# Patient Record
Sex: MALE | Race: BLACK OR AFRICAN AMERICAN | NOT HISPANIC OR LATINO | URBAN - METROPOLITAN AREA
[De-identification: names, ages, dates, MRNs, and addresses within clinical notes are randomized per-mention and may not be internally consistent; named-entity substitution may affect disease eponyms.]

---

## 2017-01-31 ENCOUNTER — OUTPATIENT (OUTPATIENT)
Dept: OUTPATIENT SERVICES | Facility: HOSPITAL | Age: 77
LOS: 1 days | Discharge: HOME | End: 2017-01-31
Payer: MEDICARE

## 2017-01-31 VITALS
HEART RATE: 69 BPM | RESPIRATION RATE: 16 BRPM | TEMPERATURE: 98 F | DIASTOLIC BLOOD PRESSURE: 70 MMHG | SYSTOLIC BLOOD PRESSURE: 137 MMHG | OXYGEN SATURATION: 97 %

## 2017-01-31 DIAGNOSIS — Z90.49 ACQUIRED ABSENCE OF OTHER SPECIFIED PARTS OF DIGESTIVE TRACT: Chronic | ICD-10-CM

## 2017-01-31 DIAGNOSIS — R94.31 ABNORMAL ELECTROCARDIOGRAM [ECG] [EKG]: ICD-10-CM

## 2017-01-31 DIAGNOSIS — Z95.1 PRESENCE OF AORTOCORONARY BYPASS GRAFT: Chronic | ICD-10-CM

## 2017-01-31 LAB
BUN SERPL-MCNC: 14 MG/DL — SIGNIFICANT CHANGE UP (ref 7–23)
CALCIUM SERPL-MCNC: 9.3 MG/DL — SIGNIFICANT CHANGE UP (ref 8.4–10.5)
CHLORIDE SERPL-SCNC: 100 MMOL/L — SIGNIFICANT CHANGE UP (ref 98–107)
CO2 SERPL-SCNC: 29 MMOL/L — SIGNIFICANT CHANGE UP (ref 22–31)
CREAT SERPL-MCNC: 0.72 MG/DL — SIGNIFICANT CHANGE UP (ref 0.5–1.3)
GLUCOSE SERPL-MCNC: 141 MG/DL — HIGH (ref 70–99)
HBA1C BLD-MCNC: 7.4 % — HIGH (ref 4–5.6)
HCT VFR BLD CALC: 39.2 % — SIGNIFICANT CHANGE UP (ref 39–50)
HGB BLD-MCNC: 13.2 G/DL — SIGNIFICANT CHANGE UP (ref 13–17)
MCHC RBC-ENTMCNC: 30.8 PG — SIGNIFICANT CHANGE UP (ref 27–34)
MCHC RBC-ENTMCNC: 33.7 % — SIGNIFICANT CHANGE UP (ref 32–36)
MCV RBC AUTO: 91.6 FL — SIGNIFICANT CHANGE UP (ref 80–100)
PLATELET # BLD AUTO: 161 K/UL — SIGNIFICANT CHANGE UP (ref 150–400)
PMV BLD: 9.8 FL — SIGNIFICANT CHANGE UP (ref 7–13)
POTASSIUM SERPL-MCNC: 4.2 MMOL/L — SIGNIFICANT CHANGE UP (ref 3.5–5.3)
POTASSIUM SERPL-SCNC: 4.2 MMOL/L — SIGNIFICANT CHANGE UP (ref 3.5–5.3)
RBC # BLD: 4.28 M/UL — SIGNIFICANT CHANGE UP (ref 4.2–5.8)
RBC # FLD: 12.6 % — SIGNIFICANT CHANGE UP (ref 10.3–14.5)
SODIUM SERPL-SCNC: 138 MMOL/L — SIGNIFICANT CHANGE UP (ref 135–145)
WBC # BLD: 7.19 K/UL — SIGNIFICANT CHANGE UP (ref 3.8–10.5)
WBC # FLD AUTO: 7.19 K/UL — SIGNIFICANT CHANGE UP (ref 3.8–10.5)

## 2017-01-31 PROCEDURE — 93010 ELECTROCARDIOGRAM REPORT: CPT

## 2017-01-31 RX ORDER — SODIUM CHLORIDE 9 MG/ML
1000 INJECTION INTRAMUSCULAR; INTRAVENOUS; SUBCUTANEOUS
Qty: 0 | Refills: 0 | Status: DISCONTINUED | OUTPATIENT
Start: 2017-01-31 | End: 2017-02-15

## 2017-01-31 RX ORDER — SODIUM CHLORIDE 9 MG/ML
3 INJECTION INTRAMUSCULAR; INTRAVENOUS; SUBCUTANEOUS EVERY 8 HOURS
Qty: 0 | Refills: 0 | Status: DISCONTINUED | OUTPATIENT
Start: 2017-01-31 | End: 2017-02-15

## 2017-01-31 RX ADMIN — SODIUM CHLORIDE 75 MILLILITER(S): 9 INJECTION INTRAMUSCULAR; INTRAVENOUS; SUBCUTANEOUS at 18:57

## 2017-01-31 NOTE — H&P CARDIOLOGY - HISTORY OF PRESENT ILLNESS
76 y.o male presents today for elective cardiac catheterization.
76 y.o. male presents today for elective cardiac catheterization. The patient c/o SOB with exertion (prolonged walking). Admits to "heaviness in the chest" after food intake. Denies palpitations, dizziness. Admits to chronic b/l lower extremities edema. The patient was evaluated by a cardiologist, had Echo and Stress test  - reports requested. The patient was told that his heart muscle is weak. Due to patient's symptoms and abnormal non invasive tests, the patient was recommended to have cardiac catheterization.  The patient denies any complaints at present.

## 2017-01-31 NOTE — H&P CARDIOLOGY - REVIEW OF SYSTEMS
NO chest pain,  palpitations, diaphoresis, lightheadedness, dizziness, syncope,  fever chills, malaise, myalgias, anorexia, weight changes ( loss or gain), night sweats, generalized fatigue abdominal pain, N/V/C/D BRBPR, melena, urinary symptoms, cough, and wheezing.

## 2017-01-31 NOTE — H&P CARDIOLOGY - PMH
CAD (coronary artery disease)    Essential hypertension    Hypercholesterolemia    Old MI (myocardial infarction)  1998  Prediabetes    S/P CABG x 3  in 1998
Essential hypertension    Prediabetes

## 2017-11-08 PROBLEM — Z00.00 ENCOUNTER FOR PREVENTIVE HEALTH EXAMINATION: Status: ACTIVE | Noted: 2017-11-08

## 2017-11-09 ENCOUNTER — RX RENEWAL (OUTPATIENT)
Age: 77
End: 2017-11-09

## 2017-11-09 DIAGNOSIS — I10 ESSENTIAL (PRIMARY) HYPERTENSION: ICD-10-CM

## 2017-11-09 DIAGNOSIS — E78.00 PURE HYPERCHOLESTEROLEMIA, UNSPECIFIED: ICD-10-CM

## 2017-11-09 RX ORDER — ATORVASTATIN CALCIUM 20 MG/1
20 TABLET, FILM COATED ORAL
Qty: 90 | Refills: 3 | Status: ACTIVE | COMMUNITY
Start: 2017-11-09 | End: 1900-01-01

## 2017-11-09 RX ORDER — LISINOPRIL 5 MG/1
5 TABLET ORAL
Qty: 90 | Refills: 0 | Status: ACTIVE | COMMUNITY
Start: 2017-11-09 | End: 1900-01-01

## 2020-04-01 ENCOUNTER — INPATIENT (INPATIENT)
Facility: HOSPITAL | Age: 80
LOS: 20 days | End: 2020-04-22
Attending: INTERNAL MEDICINE | Admitting: INTERNAL MEDICINE
Payer: MEDICARE

## 2020-04-01 VITALS
DIASTOLIC BLOOD PRESSURE: 57 MMHG | SYSTOLIC BLOOD PRESSURE: 136 MMHG | RESPIRATION RATE: 22 BRPM | TEMPERATURE: 99 F | OXYGEN SATURATION: 89 % | HEART RATE: 94 BPM

## 2020-04-01 DIAGNOSIS — J18.9 PNEUMONIA, UNSPECIFIED ORGANISM: ICD-10-CM

## 2020-04-01 DIAGNOSIS — Z95.1 PRESENCE OF AORTOCORONARY BYPASS GRAFT: Chronic | ICD-10-CM

## 2020-04-01 DIAGNOSIS — R09.02 HYPOXEMIA: ICD-10-CM

## 2020-04-01 DIAGNOSIS — I10 ESSENTIAL (PRIMARY) HYPERTENSION: ICD-10-CM

## 2020-04-01 DIAGNOSIS — B34.9 VIRAL INFECTION, UNSPECIFIED: ICD-10-CM

## 2020-04-01 DIAGNOSIS — Z90.49 ACQUIRED ABSENCE OF OTHER SPECIFIED PARTS OF DIGESTIVE TRACT: Chronic | ICD-10-CM

## 2020-04-01 DIAGNOSIS — I25.810 ATHEROSCLEROSIS OF CORONARY ARTERY BYPASS GRAFT(S) WITHOUT ANGINA PECTORIS: ICD-10-CM

## 2020-04-01 DIAGNOSIS — E11.9 TYPE 2 DIABETES MELLITUS WITHOUT COMPLICATIONS: ICD-10-CM

## 2020-04-01 DIAGNOSIS — E78.00 PURE HYPERCHOLESTEROLEMIA, UNSPECIFIED: ICD-10-CM

## 2020-04-01 PROBLEM — I25.10 ATHEROSCLEROTIC HEART DISEASE OF NATIVE CORONARY ARTERY WITHOUT ANGINA PECTORIS: Chronic | Status: ACTIVE | Noted: 2017-01-31

## 2020-04-01 PROBLEM — R73.03 PREDIABETES: Chronic | Status: ACTIVE | Noted: 2017-01-31

## 2020-04-01 PROBLEM — I25.2 OLD MYOCARDIAL INFARCTION: Chronic | Status: ACTIVE | Noted: 2017-01-31

## 2020-04-01 LAB
ALBUMIN SERPL ELPH-MCNC: 3.5 G/DL — SIGNIFICANT CHANGE UP (ref 3.3–5)
ALP SERPL-CCNC: 55 U/L — SIGNIFICANT CHANGE UP (ref 40–120)
ALT FLD-CCNC: 16 U/L — SIGNIFICANT CHANGE UP (ref 4–41)
ANION GAP SERPL CALC-SCNC: 16 MMO/L — HIGH (ref 7–14)
AST SERPL-CCNC: 47 U/L — HIGH (ref 4–40)
BASE EXCESS BLDV CALC-SCNC: 6.5 MMOL/L — SIGNIFICANT CHANGE UP
BASOPHILS # BLD AUTO: 0.01 K/UL — SIGNIFICANT CHANGE UP (ref 0–0.2)
BASOPHILS NFR BLD AUTO: 0.2 % — SIGNIFICANT CHANGE UP (ref 0–2)
BASOPHILS NFR SPEC: 0 % — SIGNIFICANT CHANGE UP (ref 0–2)
BILIRUB SERPL-MCNC: 0.5 MG/DL — SIGNIFICANT CHANGE UP (ref 0.2–1.2)
BLASTS # FLD: 0 % — SIGNIFICANT CHANGE UP (ref 0–0)
BLOOD GAS VENOUS - CREATININE: 0.66 MG/DL — SIGNIFICANT CHANGE UP (ref 0.5–1.3)
BUN SERPL-MCNC: 17 MG/DL — SIGNIFICANT CHANGE UP (ref 7–23)
CALCIUM SERPL-MCNC: 9.2 MG/DL — SIGNIFICANT CHANGE UP (ref 8.4–10.5)
CHLORIDE BLDV-SCNC: 96 MMOL/L — SIGNIFICANT CHANGE UP (ref 96–108)
CHLORIDE SERPL-SCNC: 91 MMOL/L — LOW (ref 98–107)
CO2 SERPL-SCNC: 26 MMOL/L — SIGNIFICANT CHANGE UP (ref 22–31)
CREAT SERPL-MCNC: 0.62 MG/DL — SIGNIFICANT CHANGE UP (ref 0.5–1.3)
EOSINOPHIL # BLD AUTO: 0 K/UL — SIGNIFICANT CHANGE UP (ref 0–0.5)
EOSINOPHIL NFR BLD AUTO: 0 % — SIGNIFICANT CHANGE UP (ref 0–6)
EOSINOPHIL NFR FLD: 0 % — SIGNIFICANT CHANGE UP (ref 0–6)
GAS PNL BLDV: 132 MMOL/L — LOW (ref 136–146)
GIANT PLATELETS BLD QL SMEAR: PRESENT — SIGNIFICANT CHANGE UP
GLUCOSE BLDV-MCNC: 220 MG/DL — HIGH (ref 70–99)
GLUCOSE SERPL-MCNC: 232 MG/DL — HIGH (ref 70–99)
HCO3 BLDV-SCNC: 28 MMOL/L — HIGH (ref 20–27)
HCT VFR BLD CALC: 40.7 % — SIGNIFICANT CHANGE UP (ref 39–50)
HCT VFR BLDV CALC: 41.6 % — SIGNIFICANT CHANGE UP (ref 39–51)
HGB BLD-MCNC: 13.6 G/DL — SIGNIFICANT CHANGE UP (ref 13–17)
HGB BLDV-MCNC: 13.6 G/DL — SIGNIFICANT CHANGE UP (ref 13–17)
IMM GRANULOCYTES NFR BLD AUTO: 0.3 % — SIGNIFICANT CHANGE UP (ref 0–1.5)
LACTATE BLDV-MCNC: 2.5 MMOL/L — HIGH (ref 0.5–2)
LYMPHOCYTES # BLD AUTO: 0.99 K/UL — LOW (ref 1–3.3)
LYMPHOCYTES # BLD AUTO: 15.3 % — SIGNIFICANT CHANGE UP (ref 13–44)
LYMPHOCYTES NFR SPEC AUTO: 0.9 % — LOW (ref 13–44)
MACROCYTES BLD QL: SLIGHT — SIGNIFICANT CHANGE UP
MCHC RBC-ENTMCNC: 31.1 PG — SIGNIFICANT CHANGE UP (ref 27–34)
MCHC RBC-ENTMCNC: 33.4 % — SIGNIFICANT CHANGE UP (ref 32–36)
MCV RBC AUTO: 92.9 FL — SIGNIFICANT CHANGE UP (ref 80–100)
METAMYELOCYTES # FLD: 0 % — SIGNIFICANT CHANGE UP (ref 0–1)
MICROCYTES BLD QL: SLIGHT — SIGNIFICANT CHANGE UP
MONOCYTES # BLD AUTO: 0.17 K/UL — SIGNIFICANT CHANGE UP (ref 0–0.9)
MONOCYTES NFR BLD AUTO: 2.6 % — SIGNIFICANT CHANGE UP (ref 2–14)
MONOCYTES NFR BLD: 6.1 % — SIGNIFICANT CHANGE UP (ref 2–9)
MYELOCYTES NFR BLD: 0 % — SIGNIFICANT CHANGE UP (ref 0–0)
NEUTROPHIL AB SER-ACNC: 77.2 % — HIGH (ref 43–77)
NEUTROPHILS # BLD AUTO: 5.27 K/UL — SIGNIFICANT CHANGE UP (ref 1.8–7.4)
NEUTROPHILS NFR BLD AUTO: 81.6 % — HIGH (ref 43–77)
NEUTS BAND # BLD: 9.7 % — HIGH (ref 0–6)
NRBC # FLD: 0 K/UL — SIGNIFICANT CHANGE UP (ref 0–0)
OTHER - HEMATOLOGY %: 0 — SIGNIFICANT CHANGE UP
PCO2 BLDV: 52 MMHG — HIGH (ref 41–51)
PH BLDV: 7.4 PH — SIGNIFICANT CHANGE UP (ref 7.32–7.43)
PLATELET # BLD AUTO: 164 K/UL — SIGNIFICANT CHANGE UP (ref 150–400)
PLATELET COUNT - ESTIMATE: NORMAL — SIGNIFICANT CHANGE UP
PMV BLD: 10.1 FL — SIGNIFICANT CHANGE UP (ref 7–13)
PO2 BLDV: 29 MMHG — LOW (ref 35–40)
POTASSIUM BLDV-SCNC: 4.2 MMOL/L — SIGNIFICANT CHANGE UP (ref 3.4–4.5)
POTASSIUM SERPL-MCNC: 4.4 MMOL/L — SIGNIFICANT CHANGE UP (ref 3.5–5.3)
POTASSIUM SERPL-SCNC: 4.4 MMOL/L — SIGNIFICANT CHANGE UP (ref 3.5–5.3)
PROMYELOCYTES # FLD: 0 % — SIGNIFICANT CHANGE UP (ref 0–0)
PROT SERPL-MCNC: 8 G/DL — SIGNIFICANT CHANGE UP (ref 6–8.3)
RBC # BLD: 4.38 M/UL — SIGNIFICANT CHANGE UP (ref 4.2–5.8)
RBC # FLD: 12.2 % — SIGNIFICANT CHANGE UP (ref 10.3–14.5)
REVIEW TO FOLLOW: YES — SIGNIFICANT CHANGE UP
SAO2 % BLDV: 50.9 % — LOW (ref 60–85)
SARS-COV-2 RNA SPEC QL NAA+PROBE: DETECTED
SODIUM SERPL-SCNC: 133 MMOL/L — LOW (ref 135–145)
SPHEROCYTES BLD QL SMEAR: SLIGHT — SIGNIFICANT CHANGE UP
VARIANT LYMPHS # BLD: 6.1 % — SIGNIFICANT CHANGE UP
WBC # BLD: 6.46 K/UL — SIGNIFICANT CHANGE UP (ref 3.8–10.5)
WBC # FLD AUTO: 6.46 K/UL — SIGNIFICANT CHANGE UP (ref 3.8–10.5)

## 2020-04-01 PROCEDURE — 71045 X-RAY EXAM CHEST 1 VIEW: CPT | Mod: 26

## 2020-04-01 RX ORDER — DEXTROSE 50 % IN WATER 50 %
25 SYRINGE (ML) INTRAVENOUS ONCE
Refills: 0 | Status: DISCONTINUED | OUTPATIENT
Start: 2020-04-01 | End: 2020-04-22

## 2020-04-01 RX ORDER — HEPARIN SODIUM 5000 [USP'U]/ML
5000 INJECTION INTRAVENOUS; SUBCUTANEOUS EVERY 8 HOURS
Refills: 0 | Status: DISCONTINUED | OUTPATIENT
Start: 2020-04-01 | End: 2020-04-06

## 2020-04-01 RX ORDER — METFORMIN HYDROCHLORIDE 850 MG/1
500 TABLET ORAL
Qty: 0 | Refills: 0 | DISCHARGE

## 2020-04-01 RX ORDER — HYDROXYCHLOROQUINE SULFATE 200 MG
400 TABLET ORAL EVERY 12 HOURS
Refills: 0 | Status: COMPLETED | OUTPATIENT
Start: 2020-04-01 | End: 2020-04-02

## 2020-04-01 RX ORDER — GLUCAGON INJECTION, SOLUTION 0.5 MG/.1ML
1 INJECTION, SOLUTION SUBCUTANEOUS ONCE
Refills: 0 | Status: DISCONTINUED | OUTPATIENT
Start: 2020-04-01 | End: 2020-04-22

## 2020-04-01 RX ORDER — CARVEDILOL PHOSPHATE 80 MG/1
1 CAPSULE, EXTENDED RELEASE ORAL
Qty: 0 | Refills: 0 | DISCHARGE

## 2020-04-01 RX ORDER — INSULIN LISPRO 100/ML
VIAL (ML) SUBCUTANEOUS AT BEDTIME
Refills: 0 | Status: DISCONTINUED | OUTPATIENT
Start: 2020-04-01 | End: 2020-04-02

## 2020-04-01 RX ORDER — INSULIN GLARGINE 100 [IU]/ML
15 INJECTION, SOLUTION SUBCUTANEOUS AT BEDTIME
Refills: 0 | Status: DISCONTINUED | OUTPATIENT
Start: 2020-04-01 | End: 2020-04-03

## 2020-04-01 RX ORDER — AZITHROMYCIN 500 MG/1
500 TABLET, FILM COATED ORAL DAILY
Refills: 0 | Status: DISCONTINUED | OUTPATIENT
Start: 2020-04-02 | End: 2020-04-03

## 2020-04-01 RX ORDER — SODIUM CHLORIDE 9 MG/ML
1000 INJECTION, SOLUTION INTRAVENOUS
Refills: 0 | Status: DISCONTINUED | OUTPATIENT
Start: 2020-04-01 | End: 2020-04-22

## 2020-04-01 RX ORDER — CARVEDILOL PHOSPHATE 80 MG/1
25 CAPSULE, EXTENDED RELEASE ORAL EVERY 12 HOURS
Refills: 0 | Status: DISCONTINUED | OUTPATIENT
Start: 2020-04-01 | End: 2020-04-22

## 2020-04-01 RX ORDER — ACETAMINOPHEN 500 MG
650 TABLET ORAL EVERY 4 HOURS
Refills: 0 | Status: DISCONTINUED | OUTPATIENT
Start: 2020-04-01 | End: 2020-04-22

## 2020-04-01 RX ORDER — AZITHROMYCIN 500 MG/1
500 TABLET, FILM COATED ORAL ONCE
Refills: 0 | Status: COMPLETED | OUTPATIENT
Start: 2020-04-01 | End: 2020-04-01

## 2020-04-01 RX ORDER — ASPIRIN/CALCIUM CARB/MAGNESIUM 324 MG
81 TABLET ORAL DAILY
Refills: 0 | Status: DISCONTINUED | OUTPATIENT
Start: 2020-04-01 | End: 2020-04-22

## 2020-04-01 RX ORDER — ISOSORBIDE MONONITRATE 60 MG/1
1 TABLET, EXTENDED RELEASE ORAL
Qty: 0 | Refills: 0 | DISCHARGE

## 2020-04-01 RX ORDER — CEFTRIAXONE 500 MG/1
1000 INJECTION, POWDER, FOR SOLUTION INTRAMUSCULAR; INTRAVENOUS EVERY 24 HOURS
Refills: 0 | Status: DISCONTINUED | OUTPATIENT
Start: 2020-04-02 | End: 2020-04-03

## 2020-04-01 RX ORDER — DEXTROSE 50 % IN WATER 50 %
12.5 SYRINGE (ML) INTRAVENOUS ONCE
Refills: 0 | Status: DISCONTINUED | OUTPATIENT
Start: 2020-04-01 | End: 2020-04-22

## 2020-04-01 RX ORDER — ASCORBIC ACID 60 MG
1500 TABLET,CHEWABLE ORAL ONCE
Refills: 0 | Status: COMPLETED | OUTPATIENT
Start: 2020-04-01 | End: 2020-04-02

## 2020-04-01 RX ORDER — ASPIRIN/CALCIUM CARB/MAGNESIUM 324 MG
1 TABLET ORAL
Qty: 0 | Refills: 0 | DISCHARGE

## 2020-04-01 RX ORDER — DEXTROSE 50 % IN WATER 50 %
15 SYRINGE (ML) INTRAVENOUS ONCE
Refills: 0 | Status: DISCONTINUED | OUTPATIENT
Start: 2020-04-01 | End: 2020-04-22

## 2020-04-01 RX ORDER — ATORVASTATIN CALCIUM 80 MG/1
1 TABLET, FILM COATED ORAL
Qty: 0 | Refills: 0 | DISCHARGE

## 2020-04-01 RX ORDER — CARVEDILOL PHOSPHATE 80 MG/1
25 CAPSULE, EXTENDED RELEASE ORAL EVERY 12 HOURS
Refills: 0 | Status: DISCONTINUED | OUTPATIENT
Start: 2020-04-01 | End: 2020-04-01

## 2020-04-01 RX ORDER — LISINOPRIL 2.5 MG/1
1 TABLET ORAL
Qty: 0 | Refills: 0 | DISCHARGE

## 2020-04-01 RX ORDER — ATORVASTATIN CALCIUM 80 MG/1
20 TABLET, FILM COATED ORAL AT BEDTIME
Refills: 0 | Status: DISCONTINUED | OUTPATIENT
Start: 2020-04-01 | End: 2020-04-22

## 2020-04-01 RX ORDER — THIAMINE MONONITRATE (VIT B1) 100 MG
200 TABLET ORAL
Refills: 0 | Status: DISCONTINUED | OUTPATIENT
Start: 2020-04-01 | End: 2020-04-06

## 2020-04-01 RX ORDER — ALBUTEROL 90 UG/1
2 AEROSOL, METERED ORAL EVERY 4 HOURS
Refills: 0 | Status: DISCONTINUED | OUTPATIENT
Start: 2020-04-01 | End: 2020-04-10

## 2020-04-01 RX ORDER — HYDROXYCHLOROQUINE SULFATE 200 MG
200 TABLET ORAL EVERY 12 HOURS
Refills: 0 | Status: COMPLETED | OUTPATIENT
Start: 2020-04-02 | End: 2020-04-06

## 2020-04-01 RX ORDER — CEFTRIAXONE 500 MG/1
1000 INJECTION, POWDER, FOR SOLUTION INTRAMUSCULAR; INTRAVENOUS ONCE
Refills: 0 | Status: COMPLETED | OUTPATIENT
Start: 2020-04-01 | End: 2020-04-01

## 2020-04-01 RX ORDER — HYDROXYCHLOROQUINE SULFATE 200 MG
TABLET ORAL
Refills: 0 | Status: COMPLETED | OUTPATIENT
Start: 2020-04-01 | End: 2020-04-06

## 2020-04-01 RX ORDER — LISINOPRIL 2.5 MG/1
5 TABLET ORAL DAILY
Refills: 0 | Status: DISCONTINUED | OUTPATIENT
Start: 2020-04-01 | End: 2020-04-01

## 2020-04-01 RX ORDER — INSULIN LISPRO 100/ML
VIAL (ML) SUBCUTANEOUS
Refills: 0 | Status: DISCONTINUED | OUTPATIENT
Start: 2020-04-01 | End: 2020-04-02

## 2020-04-01 RX ADMIN — Medication 400 MILLIGRAM(S): at 18:09

## 2020-04-01 RX ADMIN — CARVEDILOL PHOSPHATE 25 MILLIGRAM(S): 80 CAPSULE, EXTENDED RELEASE ORAL at 18:11

## 2020-04-01 RX ADMIN — ATORVASTATIN CALCIUM 20 MILLIGRAM(S): 80 TABLET, FILM COATED ORAL at 22:23

## 2020-04-01 RX ADMIN — HEPARIN SODIUM 5000 UNIT(S): 5000 INJECTION INTRAVENOUS; SUBCUTANEOUS at 22:23

## 2020-04-01 RX ADMIN — AZITHROMYCIN 500 MILLIGRAM(S): 500 TABLET, FILM COATED ORAL at 12:52

## 2020-04-01 RX ADMIN — Medication 35 MILLIGRAM(S): at 18:07

## 2020-04-01 RX ADMIN — CEFTRIAXONE 100 MILLIGRAM(S): 500 INJECTION, POWDER, FOR SOLUTION INTRAMUSCULAR; INTRAVENOUS at 12:52

## 2020-04-01 RX ADMIN — Medication 200 MILLIGRAM(S): at 18:10

## 2020-04-01 NOTE — ED ADULT TRIAGE NOTE - CHIEF COMPLAINT QUOTE
PT C/O fevers, cough, SOB, weakness, myalgias worsening x 1 week. PT 02 sat 62 % on room air. Pt placed on nonrebreather mask and 02 sat 89%. PHX: previous MI, CAD

## 2020-04-01 NOTE — H&P ADULT - NSHPPHYSICALEXAM_GEN_ALL_CORE
· SKIN: Skin normal color for race, warm, dry and intact. No evidence of rash.  · NEUROLOGICAL: Alert and oriented, no focal deficits, no motor or sensory deficits.  · MUSCULOSKELETAL: Spine appears normal, range of motion is not limited, no muscle or joint tenderness  · GASTROINTESTINAL: Abdomen soft, non-tender, no guarding.  · Chest Exam: normal  · Breath Sounds: b/l LE crackles  · Respiratory Distress: no  · RESPIRATORY: - - -  · CARDIAC: Normal rate, regular rhythm.  Heart sounds S1, S2.  No murmurs, rubs or gallops.  · ENMT: Airway patent, Nasal mucosa clear. Mouth with normal mucosa. Throat has no vesicles, no oropharyngeal exudates and uvula is midline.  · Nourishment: well  · Mood: appropriate  · Mentation: awake, alert, oriented to person, place, time/situation  · Manner: appropriate for situation  · Distress: no apparent  · Development: well developed  · Appearance: well appearing

## 2020-04-01 NOTE — H&P ADULT - NSHPREVIEWOFSYSTEMS_GEN_ALL_CORE
· ROS STATEMENT: all other ROS negative except as per HPI  · Respiratory [+]: COUGH, SHORTNESS OF BREATH  · RESPIRATORY: - - -  · Constitutional [+]: FEVER  · CONSTITUTIONAL: - - -

## 2020-04-01 NOTE — H&P ADULT - PROBLEM SELECTOR PLAN 5
-start lantus 15 units  -humalog mod sliding scale   -currently npo, likely will have hyperglycemia from steroids

## 2020-04-01 NOTE — ED ADULT NURSE NOTE - OBJECTIVE STATEMENT
Pt. A&Ox3, c/o sob, cough and fevers x 10 days. Pt. brought to rm 22, tachypneic and labored. Pt. appears slightly lethargic. Placed on non-rebreather and nasal cannula. Sating 77 on RA. #20g IV placed to right AC, labs sent. MD at bedside for eval. Will continue to monitor.

## 2020-04-01 NOTE — H&P ADULT - HISTORY OF PRESENT ILLNESS
78 y/o male pmh htn, hld, CAD s/p CABG c/o fever and cough x1 week. Pt admits to dry cough, subjective fever and sob over the last 1 week. Pt admits to worsening sob x2 days. Denies recent travel or sick contacts. Denies chest pain, palpitations, n/v/d, numbness, tingling, dizziness or syncope.

## 2020-04-01 NOTE — H&P ADULT - ASSESSMENT
78 y/o male pmh htn, hld, CAD s/p CABG c/o fever and cough x1 week. Admitted for covid r/o and hypoxia

## 2020-04-01 NOTE — ED PROVIDER NOTE - CLINICAL SUMMARY MEDICAL DECISION MAKING FREE TEXT BOX
[place]: Older man, F, cough, SOB. RA SaO2 at rest 77%. (B) crackles. No LE edema. Likely COVID PNA. Labs, CXR, COVID swab, O2, admit.

## 2020-04-01 NOTE — H&P ADULT - NSICDXPASTMEDICALHX_GEN_ALL_CORE_FT
PAST MEDICAL HISTORY:  CAD (coronary artery disease)     Essential hypertension     Hypercholesterolemia     Old MI (myocardial infarction) 1998    Prediabetes     S/P CABG x 3 in 1998

## 2020-04-01 NOTE — H&P ADULT - PROBLEM SELECTOR PLAN 6
statin statin    Due to [facility] policy during the evolving novel coronavirus outbreak, efforts are being made to limit unnecessary patient contacts to limit the spread of disease and preserve limited PPE. H&P along with assessment and plan is completed with the HPI, ROS, and PE of the emergency department. I have spoken to the patient to discuss code status.

## 2020-04-01 NOTE — H&P ADULT - PROBLEM SELECTOR PLAN 1
acute hypoxic respiratory failure.   Chest xray reveals right upper opacity > left Suspected 2/2 viral illness but bacterial pna can not be ruled out.   COVID +  - continue ceftriaxone 1g qday and azithromycin 500 mg qday   - start hydroxychloroquine   - EKG daily qtc 471  - solumedrol 35 mg IV BID assuming 70 kg   - albuterol HFA prn   - f/u covid pcr   - NRB right now and saturating 90-92 %, tachypneic. Unable to tolerate prone position. Will call MICU to intubate if sat <88%    GOC: Full code, patient is still AOx3.

## 2020-04-01 NOTE — H&P ADULT - NSHPLABSRESULTS_GEN_ALL_CORE
13.6   6.46  )-----------( 164      ( 01 Apr 2020 10:20 )             40.7       04-01    133<L>  |  91<L>  |  17  ----------------------------<  232<H>  4.4   |  26  |  0.62    Ca    9.2      01 Apr 2020 10:20    TPro  8.0  /  Alb  3.5  /  TBili  0.5  /  DBili  x   /  AST  47<H>  /  ALT  16  /  AlkPhos  55  04-01        Lactate Trend    CAPILLARY BLOOD GLUCOSE      Cultures:    Radiology: < from: Xray Chest 1 View- PORTABLE-Urgent (04.01.20 @ 11:21) >    EXAM:  XR CHEST PORTABLE URGENT 1V        PROCEDURE DATE:  Apr 1 2020         INTERPRETATION:  TIME OF EXAM: April 1, 2020 at 11:00 AM    CLINICAL INFORMATION: Cough and fever one week    TECHNIQUE:   Portable chest:    INTERPRETATION:     There is a right upper lobe peripheral ill-defined haziness consistent with covid 19 infection. Hazy left lung base could represent effusion and atelectasis. Sternotomy wires are present. No cardiomegaly or congestion to indicate CHF.      COMPARISON:  None available      IMPRESSION:  Vague, peripheral, right upper lobe opacity more than the left side consistent with covid 19 infection.    < end of copied text >        EKG:

## 2020-04-01 NOTE — ED PROVIDER NOTE - ATTENDING CONTRIBUTION TO CARE
I performed a face-to-face evaluation of the patient and performed a history and physical examination. I agree with the history and physical examination.    [place]: Older man, F, cough, SOB. RA SaO2 at rest 77%. (B) crackles. No LE edema. Likely COVID PNA. Labs, CXR, COVID swab, O2, admit.

## 2020-04-01 NOTE — ED ADULT NURSE REASSESSMENT NOTE - NS ED NURSE REASSESS COMMENT FT1
pt tachepneac 40-50. pt not diaphoretic, pt SPO2 @97 on 15 L NR. PA aware, to bedside to assess.   PA will consult with MICU and this RN instructed to call should pt desaturate.

## 2020-04-01 NOTE — ED PROVIDER NOTE - PMH
CAD (coronary artery disease)    Essential hypertension    Hypercholesterolemia    Old MI (myocardial infarction)  1998  Prediabetes    S/P CABG x 3  in 1998

## 2020-04-02 LAB
ALBUMIN SERPL ELPH-MCNC: 3 G/DL — LOW (ref 3.3–5)
ALP SERPL-CCNC: 54 U/L — SIGNIFICANT CHANGE UP (ref 40–120)
ALT FLD-CCNC: 15 U/L — SIGNIFICANT CHANGE UP (ref 4–41)
ANION GAP SERPL CALC-SCNC: 18 MMO/L — HIGH (ref 7–14)
AST SERPL-CCNC: 52 U/L — HIGH (ref 4–40)
BASOPHILS # BLD AUTO: 0.01 K/UL — SIGNIFICANT CHANGE UP (ref 0–0.2)
BASOPHILS NFR BLD AUTO: 0.2 % — SIGNIFICANT CHANGE UP (ref 0–2)
BILIRUB SERPL-MCNC: 0.4 MG/DL — SIGNIFICANT CHANGE UP (ref 0.2–1.2)
BUN SERPL-MCNC: 15 MG/DL — SIGNIFICANT CHANGE UP (ref 7–23)
CALCIUM SERPL-MCNC: 9 MG/DL — SIGNIFICANT CHANGE UP (ref 8.4–10.5)
CHLORIDE SERPL-SCNC: 90 MMOL/L — LOW (ref 98–107)
CO2 SERPL-SCNC: 22 MMOL/L — SIGNIFICANT CHANGE UP (ref 22–31)
CREAT SERPL-MCNC: 0.47 MG/DL — LOW (ref 0.5–1.3)
CRP SERPL-MCNC: 166.1 MG/L — HIGH
D DIMER BLD IA.RAPID-MCNC: 358 NG/ML — SIGNIFICANT CHANGE UP
EOSINOPHIL # BLD AUTO: 0 K/UL — SIGNIFICANT CHANGE UP (ref 0–0.5)
EOSINOPHIL NFR BLD AUTO: 0 % — SIGNIFICANT CHANGE UP (ref 0–6)
GLUCOSE SERPL-MCNC: 198 MG/DL — HIGH (ref 70–99)
HCT VFR BLD CALC: 43.2 % — SIGNIFICANT CHANGE UP (ref 39–50)
HGB BLD-MCNC: 14.5 G/DL — SIGNIFICANT CHANGE UP (ref 13–17)
IMM GRANULOCYTES NFR BLD AUTO: 0.7 % — SIGNIFICANT CHANGE UP (ref 0–1.5)
LYMPHOCYTES # BLD AUTO: 0.81 K/UL — LOW (ref 1–3.3)
LYMPHOCYTES # BLD AUTO: 13.6 % — SIGNIFICANT CHANGE UP (ref 13–44)
MANUAL SMEAR VERIFICATION: SIGNIFICANT CHANGE UP
MCHC RBC-ENTMCNC: 31.6 PG — SIGNIFICANT CHANGE UP (ref 27–34)
MCHC RBC-ENTMCNC: 33.6 % — SIGNIFICANT CHANGE UP (ref 32–36)
MCV RBC AUTO: 94.1 FL — SIGNIFICANT CHANGE UP (ref 80–100)
MONOCYTES # BLD AUTO: 0.14 K/UL — SIGNIFICANT CHANGE UP (ref 0–0.9)
MONOCYTES NFR BLD AUTO: 2.4 % — SIGNIFICANT CHANGE UP (ref 2–14)
NEUTROPHILS # BLD AUTO: 4.95 K/UL — SIGNIFICANT CHANGE UP (ref 1.8–7.4)
NEUTROPHILS NFR BLD AUTO: 83.1 % — HIGH (ref 43–77)
NRBC # FLD: 0 K/UL — SIGNIFICANT CHANGE UP (ref 0–0)
PLATELET # BLD AUTO: 184 K/UL — SIGNIFICANT CHANGE UP (ref 150–400)
PMV BLD: 11 FL — SIGNIFICANT CHANGE UP (ref 7–13)
POTASSIUM SERPL-MCNC: 4.5 MMOL/L — SIGNIFICANT CHANGE UP (ref 3.5–5.3)
POTASSIUM SERPL-SCNC: 4.5 MMOL/L — SIGNIFICANT CHANGE UP (ref 3.5–5.3)
PROT SERPL-MCNC: 7.7 G/DL — SIGNIFICANT CHANGE UP (ref 6–8.3)
RBC # BLD: 4.59 M/UL — SIGNIFICANT CHANGE UP (ref 4.2–5.8)
RBC # FLD: 12.7 % — SIGNIFICANT CHANGE UP (ref 10.3–14.5)
SODIUM SERPL-SCNC: 130 MMOL/L — LOW (ref 135–145)
WBC # BLD: 5.95 K/UL — SIGNIFICANT CHANGE UP (ref 3.8–10.5)
WBC # FLD AUTO: 5.95 K/UL — SIGNIFICANT CHANGE UP (ref 3.8–10.5)

## 2020-04-02 PROCEDURE — 93010 ELECTROCARDIOGRAM REPORT: CPT

## 2020-04-02 PROCEDURE — 99233 SBSQ HOSP IP/OBS HIGH 50: CPT

## 2020-04-02 RX ORDER — SODIUM CHLORIDE 9 MG/ML
1000 INJECTION, SOLUTION INTRAVENOUS
Refills: 0 | Status: DISCONTINUED | OUTPATIENT
Start: 2020-04-02 | End: 2020-04-22

## 2020-04-02 RX ORDER — DEXTROSE 50 % IN WATER 50 %
25 SYRINGE (ML) INTRAVENOUS ONCE
Refills: 0 | Status: DISCONTINUED | OUTPATIENT
Start: 2020-04-02 | End: 2020-04-22

## 2020-04-02 RX ORDER — GLUCAGON INJECTION, SOLUTION 0.5 MG/.1ML
1 INJECTION, SOLUTION SUBCUTANEOUS ONCE
Refills: 0 | Status: DISCONTINUED | OUTPATIENT
Start: 2020-04-02 | End: 2020-04-22

## 2020-04-02 RX ORDER — DEXTROSE 50 % IN WATER 50 %
15 SYRINGE (ML) INTRAVENOUS ONCE
Refills: 0 | Status: DISCONTINUED | OUTPATIENT
Start: 2020-04-02 | End: 2020-04-22

## 2020-04-02 RX ORDER — DEXTROSE 50 % IN WATER 50 %
12.5 SYRINGE (ML) INTRAVENOUS ONCE
Refills: 0 | Status: DISCONTINUED | OUTPATIENT
Start: 2020-04-02 | End: 2020-04-22

## 2020-04-02 RX ADMIN — CEFTRIAXONE 100 MILLIGRAM(S): 500 INJECTION, POWDER, FOR SOLUTION INTRAMUSCULAR; INTRAVENOUS at 04:59

## 2020-04-02 RX ADMIN — Medication 35 MILLIGRAM(S): at 17:06

## 2020-04-02 RX ADMIN — ATORVASTATIN CALCIUM 20 MILLIGRAM(S): 80 TABLET, FILM COATED ORAL at 23:17

## 2020-04-02 RX ADMIN — HEPARIN SODIUM 5000 UNIT(S): 5000 INJECTION INTRAVENOUS; SUBCUTANEOUS at 13:13

## 2020-04-02 RX ADMIN — INSULIN GLARGINE 15 UNIT(S): 100 INJECTION, SOLUTION SUBCUTANEOUS at 23:27

## 2020-04-02 RX ADMIN — CARVEDILOL PHOSPHATE 25 MILLIGRAM(S): 80 CAPSULE, EXTENDED RELEASE ORAL at 17:06

## 2020-04-02 RX ADMIN — INSULIN GLARGINE 15 UNIT(S): 100 INJECTION, SOLUTION SUBCUTANEOUS at 01:21

## 2020-04-02 RX ADMIN — HEPARIN SODIUM 5000 UNIT(S): 5000 INJECTION INTRAVENOUS; SUBCUTANEOUS at 05:00

## 2020-04-02 RX ADMIN — Medication 200 MILLIGRAM(S): at 17:06

## 2020-04-02 RX ADMIN — Medication 400 MILLIGRAM(S): at 05:01

## 2020-04-02 RX ADMIN — AZITHROMYCIN 500 MILLIGRAM(S): 500 TABLET, FILM COATED ORAL at 13:14

## 2020-04-02 RX ADMIN — CARVEDILOL PHOSPHATE 25 MILLIGRAM(S): 80 CAPSULE, EXTENDED RELEASE ORAL at 04:59

## 2020-04-02 RX ADMIN — Medication 35 MILLIGRAM(S): at 05:00

## 2020-04-02 RX ADMIN — Medication 81 MILLIGRAM(S): at 13:13

## 2020-04-02 RX ADMIN — HEPARIN SODIUM 5000 UNIT(S): 5000 INJECTION INTRAVENOUS; SUBCUTANEOUS at 23:17

## 2020-04-02 RX ADMIN — Medication 103 MILLIGRAM(S): at 02:07

## 2020-04-02 RX ADMIN — Medication 200 MILLIGRAM(S): at 05:00

## 2020-04-02 RX ADMIN — ALBUTEROL 2 PUFF(S): 90 AEROSOL, METERED ORAL at 23:17

## 2020-04-02 NOTE — PROGRESS NOTE ADULT - SUBJECTIVE AND OBJECTIVE BOX
Medicine Progress note    Patient is a 79y old  Male who presents with a chief complaint of covid r/o (01 Apr 2020 17:13)      SUBJECTIVE / OVERNIGHT EVENTS:      MEDICATIONS  (STANDING):  aspirin  chewable 81 milliGRAM(s) Oral daily  atorvastatin 20 milliGRAM(s) Oral at bedtime  azithromycin   Tablet 500 milliGRAM(s) Oral daily  carvedilol 25 milliGRAM(s) Oral every 12 hours  cefTRIAXone   IVPB 1000 milliGRAM(s) IV Intermittent every 24 hours  dextrose 5%. 1000 milliLiter(s) (50 mL/Hr) IV Continuous <Continuous>  dextrose 5%. 1000 milliLiter(s) (50 mL/Hr) IV Continuous <Continuous>  dextrose 50% Injectable 12.5 Gram(s) IV Push once  dextrose 50% Injectable 25 Gram(s) IV Push once  dextrose 50% Injectable 25 Gram(s) IV Push once  dextrose 50% Injectable 12.5 Gram(s) IV Push once  dextrose 50% Injectable 25 Gram(s) IV Push once  dextrose 50% Injectable 25 Gram(s) IV Push once  heparin  Injectable 5000 Unit(s) SubCutaneous every 8 hours  hydroxychloroquine 200 milliGRAM(s) Oral every 12 hours  hydroxychloroquine   Oral   insulin glargine Injectable (LANTUS) 15 Unit(s) SubCutaneous at bedtime  methylPREDNISolone sodium succinate Injectable 35 milliGRAM(s) IV Push two times a day  thiamine 200 milliGRAM(s) Oral <User Schedule>    MEDICATIONS  (PRN):  acetaminophen   Tablet .. 650 milliGRAM(s) Oral every 4 hours PRN Temp greater or equal to 38.5C (101.3F)  ALBUTerol    90 MICROgram(s) HFA Inhaler 2 Puff(s) Inhalation every 4 hours PRN Shortness of Breath and/or Wheezing  dextrose 40% Gel 15 Gram(s) Oral once PRN Blood Glucose LESS THAN 70 milliGRAM(s)/deciLiter  dextrose 40% Gel 15 Gram(s) Oral once PRN Blood Glucose LESS THAN 70 milliGRAM(s)/deciliter  glucagon  Injectable 1 milliGRAM(s) IntraMuscular once PRN Glucose <70 milliGRAM(s)/deciLiter  glucagon  Injectable 1 milliGRAM(s) IntraMuscular once PRN Glucose LESS THAN 70 milligrams/deciliter      CAPILLARY BLOOD GLUCOSE      POCT Blood Glucose.: 237 mg/dL (02 Apr 2020 10:17)  POCT Blood Glucose.: 212 mg/dL (02 Apr 2020 01:02)  POCT Blood Glucose.: 193 mg/dL (01 Apr 2020 22:29)    I&O's Summary      PHYSICAL EXAM:  Vital Signs Last 24 Hrs  T(C): 36.6 (02 Apr 2020 04:57), Max: 36.9 (01 Apr 2020 22:21)  T(F): 97.9 (02 Apr 2020 04:57), Max: 98.4 (01 Apr 2020 22:21)  HR: 71 (02 Apr 2020 04:57) (69 - 82)  BP: 143/66 (02 Apr 2020 04:57) (124/55 - 196/77)  BP(mean): --  RR: 32 (02 Apr 2020 04:57) (24 - 45)  SpO2: 92% (02 Apr 2020 04:57) (92% - 99%)  CONSTITUTIONAL: NAD, well-developed  RESPIRATORY:  on NRB ,  sat 90-92% , + bl crackles  CARDIOVASCULAR: Regular rate and rhythm, No lower extremity edema  ABDOMEN: Nontender to palpation, normoactive bowel sounds, no rebound/guarding  PSYCH/NEURO: A+O; affect appropriate  SKIN: No rashes; no palpable lesions    LABS:                        14.5   5.95  )-----------( 184      ( 02 Apr 2020 05:58 )             43.2   Complete Blood Count + Automated Diff (04.02.20 @ 05:58)    Auto Lymphocyte #: 0.81 K/uL    Auto Lymphocyte %: 13.6 %  Complete Blood Count + Automated Diff (04.02.20 @ 05:58)    Auto Eosinophil #: 0.00 K/uL    Auto Eosinophil %: 0.0 %    D-Dimer Assay, Quantitative (04.02.20 @ 05:58)    D-Dimer Assay, Quantitative: 358:   ng/mL    04-01    133<L>  |  91<L>  |  17  ----------------------------<  232<H>  4.4   |  26  |  0.62    Ca    9.2      01 Apr 2020 10:20    TPro  8.0  /  Alb  3.5  /  TBili  0.5  /  DBili  x   /  AST  47<H>  /  ALT  16  /  AlkPhos  55  04-01    COVID-19 PCR: Detected (04-01-20 @ 10:54)      RADIOLOGY & ADDITIONAL TESTS:    < from: Xray Chest 1 View- PORTABLE-Urgent (04.01.20 @ 11:21) >  IMPRESSION:  Vague, peripheral, right upper lobe opacity more than the left side consistent with covid 19 infection.    < end of copied text >    Imaging from Last 24 Hours:    Electrocardiogram/QTc Interval:    Case discussed with: Medicine Progress note    Patient is a 79y old  Male who presents with a chief complaint of covid r/o (01 Apr 2020 17:13)      SUBJECTIVE / OVERNIGHT EVENTS:  Oxygen requirements remains high , on NRB , RR is still 22 and pulse ox  88-90% with episodes of desat to 85, treated with deep breathing, incentive spirometry, chest PT.    MEDICATIONS  (STANDING):  aspirin  chewable 81 milliGRAM(s) Oral daily  atorvastatin 20 milliGRAM(s) Oral at bedtime  azithromycin   Tablet 500 milliGRAM(s) Oral daily  carvedilol 25 milliGRAM(s) Oral every 12 hours  cefTRIAXone   IVPB 1000 milliGRAM(s) IV Intermittent every 24 hours  dextrose 5%. 1000 milliLiter(s) (50 mL/Hr) IV Continuous <Continuous>  dextrose 5%. 1000 milliLiter(s) (50 mL/Hr) IV Continuous <Continuous>  dextrose 50% Injectable 12.5 Gram(s) IV Push once  dextrose 50% Injectable 25 Gram(s) IV Push once  dextrose 50% Injectable 25 Gram(s) IV Push once  dextrose 50% Injectable 12.5 Gram(s) IV Push once  dextrose 50% Injectable 25 Gram(s) IV Push once  dextrose 50% Injectable 25 Gram(s) IV Push once  heparin  Injectable 5000 Unit(s) SubCutaneous every 8 hours  hydroxychloroquine 200 milliGRAM(s) Oral every 12 hours  hydroxychloroquine   Oral   insulin glargine Injectable (LANTUS) 15 Unit(s) SubCutaneous at bedtime  methylPREDNISolone sodium succinate Injectable 35 milliGRAM(s) IV Push two times a day  thiamine 200 milliGRAM(s) Oral <User Schedule>    MEDICATIONS  (PRN):  acetaminophen   Tablet .. 650 milliGRAM(s) Oral every 4 hours PRN Temp greater or equal to 38.5C (101.3F)  ALBUTerol    90 MICROgram(s) HFA Inhaler 2 Puff(s) Inhalation every 4 hours PRN Shortness of Breath and/or Wheezing  dextrose 40% Gel 15 Gram(s) Oral once PRN Blood Glucose LESS THAN 70 milliGRAM(s)/deciLiter  dextrose 40% Gel 15 Gram(s) Oral once PRN Blood Glucose LESS THAN 70 milliGRAM(s)/deciliter  glucagon  Injectable 1 milliGRAM(s) IntraMuscular once PRN Glucose <70 milliGRAM(s)/deciLiter  glucagon  Injectable 1 milliGRAM(s) IntraMuscular once PRN Glucose LESS THAN 70 milligrams/deciliter      CAPILLARY BLOOD GLUCOSE      POCT Blood Glucose.: 237 mg/dL (02 Apr 2020 10:17)  POCT Blood Glucose.: 212 mg/dL (02 Apr 2020 01:02)  POCT Blood Glucose.: 193 mg/dL (01 Apr 2020 22:29)    I&O's Summary      PHYSICAL EXAM:  Vital Signs Last 24 Hrs  T(C): 36.6 (02 Apr 2020 04:57), Max: 36.9 (01 Apr 2020 22:21)  T(F): 97.9 (02 Apr 2020 04:57), Max: 98.4 (01 Apr 2020 22:21)  HR: 71 (02 Apr 2020 04:57) (69 - 82)  BP: 143/66 (02 Apr 2020 04:57) (124/55 - 196/77)  BP(mean): --  RR: 32 (02 Apr 2020 04:57) (24 - 45)  SpO2: 92% (02 Apr 2020 04:57) (92% - 99%)  CONSTITUTIONAL:  in respiratory distress, on NRB  RESPIRATORY:  on NRB ,  sat 90-92% , + bl crackles  CARDIOVASCULAR: RR No lower extremity edema  ABDOMEN: Nontender to palpation  PSYCH/NEURO: A+O x3 ; affect appropriate  SKIN: No rash     LABS:                        14.5   5.95  )-----------( 184      ( 02 Apr 2020 05:58 )             43.2   Complete Blood Count + Automated Diff (04.02.20 @ 05:58)    Auto Lymphocyte #: 0.81 K/uL    Auto Lymphocyte %: 13.6 %  Complete Blood Count + Automated Diff (04.02.20 @ 05:58)    Auto Eosinophil #: 0.00 K/uL    Auto Eosinophil %: 0.0 %    D-Dimer Assay, Quantitative (04.02.20 @ 05:58)    D-Dimer Assay, Quantitative: 358:   ng/mL    04-01    133<L>  |  91<L>  |  17  ----------------------------<  232<H>  4.4   |  26  |  0.62    Ca    9.2      01 Apr 2020 10:20    TPro  8.0  /  Alb  3.5  /  TBili  0.5  /  DBili  x   /  AST  47<H>  /  ALT  16  /  AlkPhos  55  04-01      COVID-19 PCR: Detected (04-01-20 @ 10:54)      RADIOLOGY & ADDITIONAL TESTS:    < from: Xray Chest 1 View- PORTABLE-Urgent (04.01.20 @ 11:21) >  IMPRESSION:  Vague, peripheral, right upper lobe opacity more than the left side consistent with covid 19 infection.    < end of copied text >    Imaging from Last 24 Hours:    Electrocardiogram/QTc Interval:

## 2020-04-02 NOTE — PROVIDER CONTACT NOTE (MEDICATION) - ACTION/TREATMENT ORDERED:
Hold insulin at this time and continue to monitor FS q6h for NPO status; provider to re-order sliding scale for NPO status

## 2020-04-02 NOTE — PROGRESS NOTE ADULT - PROBLEM SELECTOR PLAN 1
acute hypoxic respiratory failure.   viral pneumonia - right upper opacity > left   COVID +  - continue ceftriaxone 1g qday and azithromycin 500 mg qday   - will check BNP  - NRB 88-90 %, tachypneic. Unable to tolerate prone position. Very high risk for advancing to requiring intubation    GOC: Full code, patient is still AOx3.

## 2020-04-02 NOTE — PROGRESS NOTE ADULT - ASSESSMENT
78 y/o male pmh htn, hld, CAD s/p CABG c/o fever and cough x1 week.   COVID 19 pneumonia with acute hypoxic respiratory failure    Eosinopenia and lymphopenia are poor prognostoc signs    on Plaquenil and Solumedrol

## 2020-04-02 NOTE — PROGRESS NOTE ADULT - PROBLEM SELECTOR PLAN 2
- Continue solumedrol 35 mg IV BID assuming 70 kg  - start hydroxychloroquine ( risk for QT prolongation is extremely rare)

## 2020-04-03 DIAGNOSIS — Z29.9 ENCOUNTER FOR PROPHYLACTIC MEASURES, UNSPECIFIED: ICD-10-CM

## 2020-04-03 LAB
ALBUMIN SERPL ELPH-MCNC: 3.4 G/DL — SIGNIFICANT CHANGE UP (ref 3.3–5)
ALP SERPL-CCNC: 60 U/L — SIGNIFICANT CHANGE UP (ref 40–120)
ALT FLD-CCNC: 19 U/L — SIGNIFICANT CHANGE UP (ref 4–41)
ANION GAP SERPL CALC-SCNC: 12 MMO/L — SIGNIFICANT CHANGE UP (ref 7–14)
AST SERPL-CCNC: 65 U/L — HIGH (ref 4–40)
BILIRUB SERPL-MCNC: 0.5 MG/DL — SIGNIFICANT CHANGE UP (ref 0.2–1.2)
BUN SERPL-MCNC: 19 MG/DL — SIGNIFICANT CHANGE UP (ref 7–23)
CALCIUM SERPL-MCNC: 9.2 MG/DL — SIGNIFICANT CHANGE UP (ref 8.4–10.5)
CHLORIDE SERPL-SCNC: 93 MMOL/L — LOW (ref 98–107)
CO2 SERPL-SCNC: 29 MMOL/L — SIGNIFICANT CHANGE UP (ref 22–31)
CREAT SERPL-MCNC: 0.54 MG/DL — SIGNIFICANT CHANGE UP (ref 0.5–1.3)
FERRITIN SERPL-MCNC: 1314 NG/ML — HIGH (ref 30–400)
GLUCOSE SERPL-MCNC: 244 MG/DL — HIGH (ref 70–99)
HBA1C BLD-MCNC: 9.3 % — HIGH (ref 4–5.6)
LDH SERPL L TO P-CCNC: 733 U/L — HIGH (ref 135–225)
NT-PROBNP SERPL-SCNC: 1670 PG/ML — SIGNIFICANT CHANGE UP
POTASSIUM SERPL-MCNC: 4.5 MMOL/L — SIGNIFICANT CHANGE UP (ref 3.5–5.3)
POTASSIUM SERPL-SCNC: 4.5 MMOL/L — SIGNIFICANT CHANGE UP (ref 3.5–5.3)
PROT SERPL-MCNC: 8.1 G/DL — SIGNIFICANT CHANGE UP (ref 6–8.3)
SODIUM SERPL-SCNC: 134 MMOL/L — LOW (ref 135–145)

## 2020-04-03 PROCEDURE — 99233 SBSQ HOSP IP/OBS HIGH 50: CPT

## 2020-04-03 RX ORDER — FUROSEMIDE 40 MG
20 TABLET ORAL DAILY
Refills: 0 | Status: COMPLETED | OUTPATIENT
Start: 2020-04-04 | End: 2020-04-04

## 2020-04-03 RX ORDER — BENZOCAINE 10 %
1 GEL (GRAM) MUCOUS MEMBRANE THREE TIMES A DAY
Refills: 0 | Status: DISCONTINUED | OUTPATIENT
Start: 2020-04-03 | End: 2020-04-22

## 2020-04-03 RX ORDER — INSULIN LISPRO 100/ML
VIAL (ML) SUBCUTANEOUS
Refills: 0 | Status: DISCONTINUED | OUTPATIENT
Start: 2020-04-03 | End: 2020-04-22

## 2020-04-03 RX ORDER — INSULIN GLARGINE 100 [IU]/ML
16 INJECTION, SOLUTION SUBCUTANEOUS AT BEDTIME
Refills: 0 | Status: DISCONTINUED | OUTPATIENT
Start: 2020-04-03 | End: 2020-04-07

## 2020-04-03 RX ORDER — FUROSEMIDE 40 MG
20 TABLET ORAL ONCE
Refills: 0 | Status: COMPLETED | OUTPATIENT
Start: 2020-04-03 | End: 2020-04-03

## 2020-04-03 RX ADMIN — ATORVASTATIN CALCIUM 20 MILLIGRAM(S): 80 TABLET, FILM COATED ORAL at 22:27

## 2020-04-03 RX ADMIN — ALBUTEROL 2 PUFF(S): 90 AEROSOL, METERED ORAL at 11:08

## 2020-04-03 RX ADMIN — Medication 200 MILLIGRAM(S): at 18:39

## 2020-04-03 RX ADMIN — Medication 200 MILLIGRAM(S): at 05:26

## 2020-04-03 RX ADMIN — CEFTRIAXONE 100 MILLIGRAM(S): 500 INJECTION, POWDER, FOR SOLUTION INTRAMUSCULAR; INTRAVENOUS at 05:23

## 2020-04-03 RX ADMIN — Medication 35 MILLIGRAM(S): at 05:23

## 2020-04-03 RX ADMIN — INSULIN GLARGINE 16 UNIT(S): 100 INJECTION, SOLUTION SUBCUTANEOUS at 22:28

## 2020-04-03 RX ADMIN — Medication 81 MILLIGRAM(S): at 11:09

## 2020-04-03 RX ADMIN — HEPARIN SODIUM 5000 UNIT(S): 5000 INJECTION INTRAVENOUS; SUBCUTANEOUS at 15:33

## 2020-04-03 RX ADMIN — Medication 20 MILLIGRAM(S): at 11:07

## 2020-04-03 RX ADMIN — CARVEDILOL PHOSPHATE 25 MILLIGRAM(S): 80 CAPSULE, EXTENDED RELEASE ORAL at 18:39

## 2020-04-03 RX ADMIN — HEPARIN SODIUM 5000 UNIT(S): 5000 INJECTION INTRAVENOUS; SUBCUTANEOUS at 22:28

## 2020-04-03 RX ADMIN — HEPARIN SODIUM 5000 UNIT(S): 5000 INJECTION INTRAVENOUS; SUBCUTANEOUS at 05:24

## 2020-04-03 RX ADMIN — Medication 40 MILLIGRAM(S): at 18:39

## 2020-04-03 RX ADMIN — ALBUTEROL 2 PUFF(S): 90 AEROSOL, METERED ORAL at 22:28

## 2020-04-03 RX ADMIN — CARVEDILOL PHOSPHATE 25 MILLIGRAM(S): 80 CAPSULE, EXTENDED RELEASE ORAL at 05:24

## 2020-04-03 RX ADMIN — AZITHROMYCIN 500 MILLIGRAM(S): 500 TABLET, FILM COATED ORAL at 11:09

## 2020-04-03 RX ADMIN — ALBUTEROL 2 PUFF(S): 90 AEROSOL, METERED ORAL at 15:33

## 2020-04-03 NOTE — CHART NOTE - NSCHARTNOTEFT_GEN_A_CORE
MEDICINE PA NOTE     Case discussed with  by bedside who was unable to make a GOC decision at this time. Case discussed with Wife, Glenny [phone] at length and current medical conditions discussed at length. Explained to Wife regarding patient's O2 demand and (+) COVID PCR. Given advanced age and poor likelihood of extubation if intubated, patient likely not a good candidate for intubation. Wife expressed comprehension but despite such at this time would like to remain full code. Will continue to monitor patient.     Landon Zhao PA-C  Department of Medicine/ [facility]   In House Pager #[phone]

## 2020-04-03 NOTE — PROGRESS NOTE ADULT - PROBLEM SELECTOR PLAN 1
acute hypoxic respiratory failure.   Chest xray reveals right upper opacity > left Suspected 2/2 viral illness but bacterial pna can not be ruled out.   COVID +  - continue ceftriaxone 1g qday and azithromycin 500 mg qday   - start hydroxychloroquine   - EKG daily qtc 471  - solumedrol 35 mg IV BID assuming 70 kg   - albuterol HFA prn   - f/u covid pcr   - NRB right now and saturating 90-92 %, tachypneic. Unable to tolerate prone position. Will call MICU to intubate if sat <88%    GOC: Full code, patient is still AOx3. acute hypoxic respiratory failure.   Chest xray reveals right upper opacity > left Suspected 2/2 viral illness but   - d/c ceftriaxone   - continue hydroxychloroquine   - continue NRB , may need proning   - discussed GOC and possibility of requiring intubation and the patient expressed wish that " all has to be done" / also spoke with patient's wife  - f/u covid pcr   - NRB right now and saturating 90-92 %, tachypneic. Unable to tolerate prone position. Will call MICU to intubate if sat <88%    GOC: Full code, patient is still AOx3. acute hypoxic respiratory failure due to viral pneumonia with right upper opacity > left   - d/c ceftriaxone and d/c Azithromycin   - continue hydroxychloroquine   - continue NRB , may need proning   - discussed GOC and possibility of requiring intubation and the patient expressed wish that " all has to be done" / also spoke with patient's wife

## 2020-04-03 NOTE — PROGRESS NOTE ADULT - PROBLEM SELECTOR PROBLEM 3
Coronary artery disease involving coronary bypass graft of native heart without angina pectoris Essential hypertension R/O Hyponatremia

## 2020-04-03 NOTE — PROGRESS NOTE ADULT - PROBLEM SELECTOR PROBLEM 5
Type 2 diabetes mellitus without complication, without long-term current use of insulin DVT prophylaxis

## 2020-04-03 NOTE — PROGRESS NOTE ADULT - PROBLEM SELECTOR PLAN 5
-start lantus 15 units  -humalog mod sliding scale   -currently npo, likely will have hyperglycemia from steroids Heparin 5000 sq q8h

## 2020-04-03 NOTE — CHART NOTE - NSCHARTNOTEFT_GEN_A_CORE
-----Late Entry-----    Notified by RN that the patient's oxygen saturation was 86% on NRB.     Patient is COVID POSITIVE, here w/ sepsis, viral PNA w/ hypoxic respiratory failure 2/2 to COVID infection. Patient currently on Plaquenil, Ceftriaxone, Azithromycin, Solumedrol, thiamine and Vit C.     RN instructed to prone patient and to give Albuterol. Despite repositioning patient and albuterol, patient's oxygen saturation remained at 88%. Patient evaluated at bedside, patient is tachypneic, lying on his side. He cannot tolerate lying prone and refused to stay in prone position. NC added 4L and patient now saturating >90%. RN instructed to do incentive spirometry, chest PT and have patient take deep breaths frequently throughout the night. Patient remains on continuous pulse ox. Will continue to monitor pt's status closely, as he is a high risk for intubation with his multiple comorbid conditions.

## 2020-04-03 NOTE — PROGRESS NOTE ADULT - ASSESSMENT
80 y/o male pmh htn, hld, CAD s/p CABG c/o fever and cough x1 week. Admitted for covid r/o and hypoxia 78 y/o male pmh htn, hld, CAD s/p CABG c/o fever and cough x1 week.  COVID19 with Acute hypoxic respiratory failure and viral pneumonia with chest xray reveals right upper opacity > left  high inflammatory markers, suggestive of cytokine storm with high BNP    - d/c AB and continue hydroxychloroquine   - continue NRB , may need proning . Discussed GOC and possibility of requiring intubation and the patient expressed wish that " all has to be done" / also spoke with patient's wife  -  continue solumedrol 35 mg IV BID  x 72 hours and trend inflammatory markers  - if inflammatory markers continue to rise - consider IL1 inhibitor  - fu BNP, add another dose of Lasix 20 mg in am      Plan d/w PA 80 y/o male pmh htn, hld, CAD s/p CABG c/o fever and cough x1 week.  COVID19 with Acute hypoxic respiratory failure and viral pneumonia with chest xray reveals right upper opacity > left  high inflammatory markers, suggestive of cytokine storm with high BNP  Hyponatremia likely related to COVID19    - d/c AB and continue hydroxychloroquine   - continue NRB , may need proning . Discussed GOC and possibility of requiring intubation and the patient expressed wish that " all has to be done" / also spoke with patient's wife  - continue solumedrol 35 mg IV BID  x 72 hours and trend inflammatory markers  - if inflammatory markers continue to rise - consider IL1 inhibitor  - fu BNP, add another dose of Lasix 20 mg in am      Plan d/w PA

## 2020-04-03 NOTE — CHART NOTE - NSCHARTNOTEFT_GEN_A_CORE
MEDICINE PA NOTE     78 YO M with PMHX of HTN, HLD and CAD s/p CABG p/w fever and cough x 1 week. CXR with RUL > ZACH opacity consistent with COVID 19. Patient admitted to rule out COVID. Patient noted with be COVID positive with increasing amounts of O2 demand. Currently on NRB and 6L NC with Plaquenil, Zithromax, CTX and Solumedrol.     RRT called this morning for hypoxia and WOB. Patient seen by bedside and SpO2 low 80s supine. Patient proned and chest PT performed with improvement in SpO2 to 91. Chest with mild crackles and congestion. Lasix 20 IVP x 1 dose given. Patient to remained prone. GOC to be discussed with Wife - Glenny Carrera ([phone]). Will continue to monitor patient.     Landon Zhao PA-C  Department of Medicine/ [facility]   In House Pager #[phone]

## 2020-04-03 NOTE — PROGRESS NOTE ADULT - PROBLEM SELECTOR PLAN 3
continue asp and statin continue coreg   - restart lisinopril if remains htn Mild Hyponatremia - 134  - urine lites  - monitor BMP

## 2020-04-03 NOTE — PROGRESS NOTE ADULT - SUBJECTIVE AND OBJECTIVE BOX
Medicine Progress note    Patient is a 79y old  Male who presents with a chief complaint of covid r/o (02 Apr 2020 12:23)      SUBJECTIVE / OVERNIGHT EVENTS:  in the morning had desaturated      MEDICATIONS  (STANDING):  aspirin  chewable 81 milliGRAM(s) Oral daily  atorvastatin 20 milliGRAM(s) Oral at bedtime  azithromycin   Tablet 500 milliGRAM(s) Oral daily  carvedilol 25 milliGRAM(s) Oral every 12 hours  cefTRIAXone   IVPB 1000 milliGRAM(s) IV Intermittent every 24 hours  dextrose 5%. 1000 milliLiter(s) (50 mL/Hr) IV Continuous <Continuous>  dextrose 5%. 1000 milliLiter(s) (50 mL/Hr) IV Continuous <Continuous>  dextrose 50% Injectable 12.5 Gram(s) IV Push once  dextrose 50% Injectable 25 Gram(s) IV Push once  dextrose 50% Injectable 25 Gram(s) IV Push once  dextrose 50% Injectable 12.5 Gram(s) IV Push once  dextrose 50% Injectable 25 Gram(s) IV Push once  dextrose 50% Injectable 25 Gram(s) IV Push once  heparin  Injectable 5000 Unit(s) SubCutaneous every 8 hours  hydroxychloroquine 200 milliGRAM(s) Oral every 12 hours  hydroxychloroquine   Oral   insulin glargine Injectable (LANTUS) 15 Unit(s) SubCutaneous at bedtime  methylPREDNISolone sodium succinate Injectable 40 milliGRAM(s) IV Push two times a day  thiamine 200 milliGRAM(s) Oral <User Schedule>    MEDICATIONS  (PRN):  acetaminophen   Tablet .. 650 milliGRAM(s) Oral every 4 hours PRN Temp greater or equal to 38.5C (101.3F)  ALBUTerol    90 MICROgram(s) HFA Inhaler 2 Puff(s) Inhalation every 4 hours PRN Shortness of Breath and/or Wheezing  benzocaine 20% Spray 1 Spray(s) Topical three times a day PRN sore throat  dextrose 40% Gel 15 Gram(s) Oral once PRN Blood Glucose LESS THAN 70 milliGRAM(s)/deciLiter  dextrose 40% Gel 15 Gram(s) Oral once PRN Blood Glucose LESS THAN 70 milliGRAM(s)/deciliter  glucagon  Injectable 1 milliGRAM(s) IntraMuscular once PRN Glucose <70 milliGRAM(s)/deciLiter  glucagon  Injectable 1 milliGRAM(s) IntraMuscular once PRN Glucose LESS THAN 70 milligrams/deciliter      CAPILLARY BLOOD GLUCOSE      POCT Blood Glucose.: 261 mg/dL (03 Apr 2020 12:37)  POCT Blood Glucose.: 235 mg/dL (03 Apr 2020 08:44)  POCT Blood Glucose.: 228 mg/dL (03 Apr 2020 06:55)  POCT Blood Glucose.: 229 mg/dL (02 Apr 2020 23:02)    I&O's Summary    03 Apr 2020 07:01  -  03 Apr 2020 16:54  --------------------------------------------------------  IN: 0 mL / OUT: 400 mL / NET: -400 mL        PHYSICAL EXAM:  Vital Signs Last 24 Hrs  T(C): 36.6 (03 Apr 2020 11:17), Max: 36.9 (03 Apr 2020 05:02)  T(F): 97.8 (03 Apr 2020 11:17), Max: 98.4 (03 Apr 2020 05:02)  HR: 74 (03 Apr 2020 11:17) (68 - 80)  BP: 137/59 (03 Apr 2020 11:17) (135/59 - 161/70)  BP(mean): --  RR: 24 (03 Apr 2020 11:17) (22 - 29)  SpO2: 93% (03 Apr 2020 11:17) (86% - 94%)    CONSTITUTIONAL:  in respiratory distress, on NRB  RESPIRATORY:  on NRB ,  sat 90-92% , maintains sat when upright  CARDIOVASCULAR: RR No lower extremity edema  ABDOMEN: Nontender to palpation  PSYCH/NEURO: A+O x3 ; affect appropriate  SKIN: No rash     LABS:                        14.5   5.95  )-----------( 184      ( 02 Apr 2020 05:58 )             43.2     04-03    134<L>  |  93<L>  |  19  ----------------------------<  244<H>  4.5   |  29  |  0.54    Ca    9.2      03 Apr 2020 06:14    TPro  8.1  /  Alb  3.4  /  TBili  0.5  /  DBili  x   /  AST  65<H>  /  ALT  19  /  AlkPhos  60  04-03              COVID-19 PCR: Detected (04-01-20 @ 10:54)      RADIOLOGY & ADDITIONAL TESTS:  Imaging from Last 24 Hours:    Electrocardiogram/QTc Interval:    Case discussed with: Medicine Progress note    Patient is a 79y old  Male who presents with a chief complaint of covid r/o (02 Apr 2020 12:23)      SUBJECTIVE / OVERNIGHT EVENTS:    In the morning RRT was called for hypoxia to mid-80s. Patient was proned, and saturations improved to 90-92%.   also received Lasix 20 mg a day       MEDICATIONS  (STANDING):  aspirin  chewable 81 milliGRAM(s) Oral daily  atorvastatin 20 milliGRAM(s) Oral at bedtime  azithromycin   Tablet 500 milliGRAM(s) Oral daily  carvedilol 25 milliGRAM(s) Oral every 12 hours  cefTRIAXone   IVPB 1000 milliGRAM(s) IV Intermittent every 24 hours  dextrose 5%. 1000 milliLiter(s) (50 mL/Hr) IV Continuous <Continuous>  dextrose 5%. 1000 milliLiter(s) (50 mL/Hr) IV Continuous <Continuous>  dextrose 50% Injectable 12.5 Gram(s) IV Push once  dextrose 50% Injectable 25 Gram(s) IV Push once  dextrose 50% Injectable 25 Gram(s) IV Push once  dextrose 50% Injectable 12.5 Gram(s) IV Push once  dextrose 50% Injectable 25 Gram(s) IV Push once  dextrose 50% Injectable 25 Gram(s) IV Push once  heparin  Injectable 5000 Unit(s) SubCutaneous every 8 hours  hydroxychloroquine 200 milliGRAM(s) Oral every 12 hours  hydroxychloroquine   Oral   insulin glargine Injectable (LANTUS) 15 Unit(s) SubCutaneous at bedtime  methylPREDNISolone sodium succinate Injectable 40 milliGRAM(s) IV Push two times a day  thiamine 200 milliGRAM(s) Oral <User Schedule>    MEDICATIONS  (PRN):  acetaminophen   Tablet .. 650 milliGRAM(s) Oral every 4 hours PRN Temp greater or equal to 38.5C (101.3F)  ALBUTerol    90 MICROgram(s) HFA Inhaler 2 Puff(s) Inhalation every 4 hours PRN Shortness of Breath and/or Wheezing  benzocaine 20% Spray 1 Spray(s) Topical three times a day PRN sore throat  dextrose 40% Gel 15 Gram(s) Oral once PRN Blood Glucose LESS THAN 70 milliGRAM(s)/deciLiter  dextrose 40% Gel 15 Gram(s) Oral once PRN Blood Glucose LESS THAN 70 milliGRAM(s)/deciliter  glucagon  Injectable 1 milliGRAM(s) IntraMuscular once PRN Glucose <70 milliGRAM(s)/deciLiter  glucagon  Injectable 1 milliGRAM(s) IntraMuscular once PRN Glucose LESS THAN 70 milligrams/deciliter      CAPILLARY BLOOD GLUCOSE      POCT Blood Glucose.: 261 mg/dL (03 Apr 2020 12:37)  POCT Blood Glucose.: 235 mg/dL (03 Apr 2020 08:44)  POCT Blood Glucose.: 228 mg/dL (03 Apr 2020 06:55)  POCT Blood Glucose.: 229 mg/dL (02 Apr 2020 23:02)    I&O's Summary    03 Apr 2020 07:01  -  03 Apr 2020 16:54  --------------------------------------------------------  IN: 0 mL / OUT: 400 mL / NET: -400 mL        PHYSICAL EXAM:  Vital Signs Last 24 Hrs  T(C): 36.6 (03 Apr 2020 11:17), Max: 36.9 (03 Apr 2020 05:02)  T(F): 97.8 (03 Apr 2020 11:17), Max: 98.4 (03 Apr 2020 05:02)  HR: 74 (03 Apr 2020 11:17) (68 - 80)  BP: 137/59 (03 Apr 2020 11:17) (135/59 - 161/70)  BP(mean): --  RR: 24 (03 Apr 2020 11:17) (22 - 29)  SpO2: 93% (03 Apr 2020 11:17) (86% - 94%)    CONSTITUTIONAL:  in respiratory distress,  on NRB ,  sat 90-92%  RESPIRATORY: labored breathing  CARDIOVASCULAR: RR   No lower extremity edema  ABDOMEN: Nontender to palpation  PSYCH/NEURO: A+O x3 ; affect appropriate  SKIN: No rash     LABS:                        14.5   5.95  )-----------( 184      ( 02 Apr 2020 05:58 )             43.2     04-03    134<L>  |  93<L>  |  19  ----------------------------<  244<H>  4.5   |  29  |  0.54    Ca    9.2      03 Apr 2020 06:14    TPro  8.1  /  Alb  3.4  /  TBili  0.5  /  DBili  x   /  AST  65<H>  /  ALT  19  /  AlkPhos  60  04-03    Serum Pro-Brain Natriuretic Peptide (04.03.20 @ 06:14)    Serum Pro-Brain Natriuretic Peptide: 1670: ACUTE CONGESTIVE HEART FAILURE is UNLIKELY if NT-proBNP  is < 300 pg/mL.  Ferritin, Serum (04.03.20 @ 06:14)    Ferritin, Serum: 1314 ng/mL  C-Reactive Protein, Serum (04.02.20 @ 05:58)    C-Reactive Protein, Serum: 166.1 mg/L    COVID-19 PCR: Detected (04-01-20 @ 10:54)      RADIOLOGY & ADDITIONAL TESTS:  Imaging from Last 24 Hours:    Electrocardiogram/QTc Interval:    Case discussed with: Medicine Progress note    Patient is a 79y old  Male who presents with a chief complaint of covid r/o (02 Apr 2020 12:23)      SUBJECTIVE / OVERNIGHT EVENTS:    In the morning RRT was called for hypoxia to mid-80s. Patient was proned, and saturations improved to 90-92%.   also received Lasix 20 mg a day       MEDICATIONS  (STANDING):  aspirin  chewable 81 milliGRAM(s) Oral daily  atorvastatin 20 milliGRAM(s) Oral at bedtime  azithromycin   Tablet 500 milliGRAM(s) Oral daily  carvedilol 25 milliGRAM(s) Oral every 12 hours  cefTRIAXone   IVPB 1000 milliGRAM(s) IV Intermittent every 24 hours  dextrose 5%. 1000 milliLiter(s) (50 mL/Hr) IV Continuous <Continuous>  dextrose 5%. 1000 milliLiter(s) (50 mL/Hr) IV Continuous <Continuous>  dextrose 50% Injectable 12.5 Gram(s) IV Push once  dextrose 50% Injectable 25 Gram(s) IV Push once  dextrose 50% Injectable 25 Gram(s) IV Push once  dextrose 50% Injectable 12.5 Gram(s) IV Push once  dextrose 50% Injectable 25 Gram(s) IV Push once  dextrose 50% Injectable 25 Gram(s) IV Push once  heparin  Injectable 5000 Unit(s) SubCutaneous every 8 hours  hydroxychloroquine 200 milliGRAM(s) Oral every 12 hours  hydroxychloroquine   Oral   insulin glargine Injectable (LANTUS) 15 Unit(s) SubCutaneous at bedtime  methylPREDNISolone sodium succinate Injectable 40 milliGRAM(s) IV Push two times a day  thiamine 200 milliGRAM(s) Oral <User Schedule>    MEDICATIONS  (PRN):  acetaminophen   Tablet .. 650 milliGRAM(s) Oral every 4 hours PRN Temp greater or equal to 38.5C (101.3F)  ALBUTerol    90 MICROgram(s) HFA Inhaler 2 Puff(s) Inhalation every 4 hours PRN Shortness of Breath and/or Wheezing  benzocaine 20% Spray 1 Spray(s) Topical three times a day PRN sore throat  dextrose 40% Gel 15 Gram(s) Oral once PRN Blood Glucose LESS THAN 70 milliGRAM(s)/deciLiter  dextrose 40% Gel 15 Gram(s) Oral once PRN Blood Glucose LESS THAN 70 milliGRAM(s)/deciliter  glucagon  Injectable 1 milliGRAM(s) IntraMuscular once PRN Glucose <70 milliGRAM(s)/deciLiter  glucagon  Injectable 1 milliGRAM(s) IntraMuscular once PRN Glucose LESS THAN 70 milligrams/deciliter      CAPILLARY BLOOD GLUCOSE      POCT Blood Glucose.: 261 mg/dL (03 Apr 2020 12:37)  POCT Blood Glucose.: 235 mg/dL (03 Apr 2020 08:44)  POCT Blood Glucose.: 228 mg/dL (03 Apr 2020 06:55)  POCT Blood Glucose.: 229 mg/dL (02 Apr 2020 23:02)    I&O's Summary    03 Apr 2020 07:01  -  03 Apr 2020 16:54  --------------------------------------------------------  IN: 0 mL / OUT: 400 mL / NET: -400 mL        PHYSICAL EXAM:  Vital Signs Last 24 Hrs  T(C): 36.6 (03 Apr 2020 11:17), Max: 36.9 (03 Apr 2020 05:02)  T(F): 97.8 (03 Apr 2020 11:17), Max: 98.4 (03 Apr 2020 05:02)  HR: 74 (03 Apr 2020 11:17) (68 - 80)  BP: 137/59 (03 Apr 2020 11:17) (135/59 - 161/70)  BP(mean): --  RR: 24 (03 Apr 2020 11:17) (22 - 29)  SpO2: 93% (03 Apr 2020 11:17) (86% - 94%)    CONSTITUTIONAL:  in respiratory distress,  on NRB ,  sat 90-92%  RESPIRATORY: labored breathing  CARDIOVASCULAR: RR   No lower extremity edema  ABDOMEN: Nontender to palpation  PSYCH/NEURO: A+O x3 ; affect appropriate  SKIN: No rash     LABS:                        14.5   5.95  )-----------( 184      ( 02 Apr 2020 05:58 )             43.2     04-03    134<L>  |  93<L>  |  19  ----------------------------<  244<H>  4.5   |  29  |  0.54    Ca    9.2      03 Apr 2020 06:14    TPro  8.1  /  Alb  3.4  /  TBili  0.5  /  DBili  x   /  AST  65<H>  /  ALT  19  /  AlkPhos  60  04-03    Serum Pro-Brain Natriuretic Peptide (04.03.20 @ 06:14)    Serum Pro-Brain Natriuretic Peptide: 1670: ACUTE CONGESTIVE HEART FAILURE is UNLIKELY if NT-proBNP  is < 300 pg/mL.  Ferritin, Serum (04.03.20 @ 06:14)    Ferritin, Serum: 1314 ng/mL  C-Reactive Protein, Serum (04.02.20 @ 05:58)    C-Reactive Protein, Serum: 166.1 mg/L    Troponin T, High Sensitivity Result (04.01.20 @ 10:20)    Troponin T, High Sensitivity: 18 ng/L ( indetermin)        COVID-19 PCR: Detected (04-01-20 @ 10:54)      RADIOLOGY & ADDITIONAL TESTS:  Imaging from Last 24 Hours:    Electrocardiogram/QTc Interval:

## 2020-04-03 NOTE — PROGRESS NOTE ADULT - PROBLEM SELECTOR PLAN 2
treatment as above COVID 19 + pneumonia with high inflammatory markers and high BNP  - solumedrol 35 mg IV BID  x 72 hours  - if inflammatory markers do not improve- consider IL1 inhibitor  - fu BNP, add another dose of Lasix 20 mg in am

## 2020-04-03 NOTE — PROGRESS NOTE ADULT - PROBLEM SELECTOR PLAN 4
continue coreg   -restart lisinopril if remains htn Heparin 5000 sq q8h continue coreg   - restart lisinopril if remains htn

## 2020-04-04 LAB
ALBUMIN SERPL ELPH-MCNC: 3.3 G/DL — SIGNIFICANT CHANGE UP (ref 3.3–5)
ALP SERPL-CCNC: 71 U/L — SIGNIFICANT CHANGE UP (ref 40–120)
ALT FLD-CCNC: 21 U/L — SIGNIFICANT CHANGE UP (ref 4–41)
ANION GAP SERPL CALC-SCNC: 15 MMO/L — HIGH (ref 7–14)
AST SERPL-CCNC: 53 U/L — HIGH (ref 4–40)
BILIRUB SERPL-MCNC: 0.6 MG/DL — SIGNIFICANT CHANGE UP (ref 0.2–1.2)
BUN SERPL-MCNC: 22 MG/DL — SIGNIFICANT CHANGE UP (ref 7–23)
CALCIUM SERPL-MCNC: 9.4 MG/DL — SIGNIFICANT CHANGE UP (ref 8.4–10.5)
CHLORIDE SERPL-SCNC: 94 MMOL/L — LOW (ref 98–107)
CO2 SERPL-SCNC: 30 MMOL/L — SIGNIFICANT CHANGE UP (ref 22–31)
CREAT SERPL-MCNC: 0.52 MG/DL — SIGNIFICANT CHANGE UP (ref 0.5–1.3)
GLUCOSE SERPL-MCNC: 223 MG/DL — HIGH (ref 70–99)
HCT VFR BLD CALC: 47.3 % — SIGNIFICANT CHANGE UP (ref 39–50)
HGB BLD-MCNC: 15.9 G/DL — SIGNIFICANT CHANGE UP (ref 13–17)
MAGNESIUM SERPL-MCNC: 2.4 MG/DL — SIGNIFICANT CHANGE UP (ref 1.6–2.6)
MCHC RBC-ENTMCNC: 30.8 PG — SIGNIFICANT CHANGE UP (ref 27–34)
MCHC RBC-ENTMCNC: 33.6 % — SIGNIFICANT CHANGE UP (ref 32–36)
MCV RBC AUTO: 91.7 FL — SIGNIFICANT CHANGE UP (ref 80–100)
NRBC # FLD: 0 K/UL — SIGNIFICANT CHANGE UP (ref 0–0)
PHOSPHATE SERPL-MCNC: 2 MG/DL — LOW (ref 2.5–4.5)
PLATELET # BLD AUTO: 249 K/UL — SIGNIFICANT CHANGE UP (ref 150–400)
PMV BLD: 10.3 FL — SIGNIFICANT CHANGE UP (ref 7–13)
POTASSIUM SERPL-MCNC: 3.8 MMOL/L — SIGNIFICANT CHANGE UP (ref 3.5–5.3)
POTASSIUM SERPL-SCNC: 3.8 MMOL/L — SIGNIFICANT CHANGE UP (ref 3.5–5.3)
PROT SERPL-MCNC: 8.2 G/DL — SIGNIFICANT CHANGE UP (ref 6–8.3)
RBC # BLD: 5.16 M/UL — SIGNIFICANT CHANGE UP (ref 4.2–5.8)
RBC # FLD: 12 % — SIGNIFICANT CHANGE UP (ref 10.3–14.5)
SODIUM SERPL-SCNC: 139 MMOL/L — SIGNIFICANT CHANGE UP (ref 135–145)
WBC # BLD: 11.89 K/UL — HIGH (ref 3.8–10.5)
WBC # FLD AUTO: 11.89 K/UL — HIGH (ref 3.8–10.5)

## 2020-04-04 PROCEDURE — 99233 SBSQ HOSP IP/OBS HIGH 50: CPT

## 2020-04-04 RX ADMIN — CARVEDILOL PHOSPHATE 25 MILLIGRAM(S): 80 CAPSULE, EXTENDED RELEASE ORAL at 06:27

## 2020-04-04 RX ADMIN — ALBUTEROL 2 PUFF(S): 90 AEROSOL, METERED ORAL at 10:44

## 2020-04-04 RX ADMIN — Medication 81 MILLIGRAM(S): at 12:48

## 2020-04-04 RX ADMIN — Medication 1 SPRAY(S): at 06:27

## 2020-04-04 RX ADMIN — ALBUTEROL 2 PUFF(S): 90 AEROSOL, METERED ORAL at 06:27

## 2020-04-04 RX ADMIN — Medication 1: at 18:39

## 2020-04-04 RX ADMIN — CARVEDILOL PHOSPHATE 25 MILLIGRAM(S): 80 CAPSULE, EXTENDED RELEASE ORAL at 18:39

## 2020-04-04 RX ADMIN — Medication 2: at 09:29

## 2020-04-04 RX ADMIN — Medication 200 MILLIGRAM(S): at 18:41

## 2020-04-04 RX ADMIN — Medication 1: at 12:47

## 2020-04-04 RX ADMIN — ATORVASTATIN CALCIUM 20 MILLIGRAM(S): 80 TABLET, FILM COATED ORAL at 23:57

## 2020-04-04 RX ADMIN — HEPARIN SODIUM 5000 UNIT(S): 5000 INJECTION INTRAVENOUS; SUBCUTANEOUS at 06:27

## 2020-04-04 RX ADMIN — Medication 40 MILLIGRAM(S): at 06:27

## 2020-04-04 RX ADMIN — HEPARIN SODIUM 5000 UNIT(S): 5000 INJECTION INTRAVENOUS; SUBCUTANEOUS at 23:57

## 2020-04-04 RX ADMIN — HEPARIN SODIUM 5000 UNIT(S): 5000 INJECTION INTRAVENOUS; SUBCUTANEOUS at 12:49

## 2020-04-04 RX ADMIN — Medication 200 MILLIGRAM(S): at 06:27

## 2020-04-04 RX ADMIN — Medication 20 MILLIGRAM(S): at 06:27

## 2020-04-04 RX ADMIN — INSULIN GLARGINE 16 UNIT(S): 100 INJECTION, SOLUTION SUBCUTANEOUS at 23:57

## 2020-04-04 RX ADMIN — Medication 1 SPRAY(S): at 10:45

## 2020-04-04 NOTE — PROGRESS NOTE ADULT - SUBJECTIVE AND OBJECTIVE BOX
Medicine Progress note    Patient is a 79y old  Male who presents with a chief complaint of covid (03 Apr 2020 16:54)      SUBJECTIVE / OVERNIGHT EVENTS:      MEDICATIONS  (STANDING):  aspirin  chewable 81 milliGRAM(s) Oral daily  atorvastatin 20 milliGRAM(s) Oral at bedtime  carvedilol 25 milliGRAM(s) Oral every 12 hours  dextrose 5%. 1000 milliLiter(s) (50 mL/Hr) IV Continuous <Continuous>  dextrose 5%. 1000 milliLiter(s) (50 mL/Hr) IV Continuous <Continuous>  dextrose 50% Injectable 12.5 Gram(s) IV Push once  dextrose 50% Injectable 25 Gram(s) IV Push once  dextrose 50% Injectable 25 Gram(s) IV Push once  dextrose 50% Injectable 12.5 Gram(s) IV Push once  dextrose 50% Injectable 25 Gram(s) IV Push once  dextrose 50% Injectable 25 Gram(s) IV Push once  heparin  Injectable 5000 Unit(s) SubCutaneous every 8 hours  hydroxychloroquine 200 milliGRAM(s) Oral every 12 hours  hydroxychloroquine   Oral   insulin glargine Injectable (LANTUS) 16 Unit(s) SubCutaneous at bedtime  insulin lispro (HumaLOG) corrective regimen sliding scale   SubCutaneous three times a day before meals  methylPREDNISolone sodium succinate Injectable 40 milliGRAM(s) IV Push two times a day  thiamine 200 milliGRAM(s) Oral <User Schedule>    MEDICATIONS  (PRN):  acetaminophen   Tablet .. 650 milliGRAM(s) Oral every 4 hours PRN Temp greater or equal to 38.5C (101.3F)  ALBUTerol    90 MICROgram(s) HFA Inhaler 2 Puff(s) Inhalation every 4 hours PRN Shortness of Breath and/or Wheezing  benzocaine 20% Spray 1 Spray(s) Topical three times a day PRN sore throat  dextrose 40% Gel 15 Gram(s) Oral once PRN Blood Glucose LESS THAN 70 milliGRAM(s)/deciLiter  dextrose 40% Gel 15 Gram(s) Oral once PRN Blood Glucose LESS THAN 70 milliGRAM(s)/deciliter  glucagon  Injectable 1 milliGRAM(s) IntraMuscular once PRN Glucose <70 milliGRAM(s)/deciLiter  glucagon  Injectable 1 milliGRAM(s) IntraMuscular once PRN Glucose LESS THAN 70 milligrams/deciliter      CAPILLARY BLOOD GLUCOSE      POCT Blood Glucose.: 221 mg/dL (04 Apr 2020 08:42)  POCT Blood Glucose.: 258 mg/dL (04 Apr 2020 07:20)  POCT Blood Glucose.: 224 mg/dL (03 Apr 2020 21:28)  POCT Blood Glucose.: 232 mg/dL (03 Apr 2020 17:16)  POCT Blood Glucose.: 261 mg/dL (03 Apr 2020 12:37)    I&O's Summary    03 Apr 2020 07:01  -  04 Apr 2020 07:00  --------------------------------------------------------  IN: 0 mL / OUT: 400 mL / NET: -400 mL    04 Apr 2020 07:01  -  04 Apr 2020 10:51  --------------------------------------------------------  IN: 0 mL / OUT: 600 mL / NET: -600 mL        PHYSICAL EXAM:  Vital Signs Last 24 Hrs  T(C): 36.7 (04 Apr 2020 10:37), Max: 36.7 (03 Apr 2020 22:42)  T(F): 98 (04 Apr 2020 10:37), Max: 98.1 (03 Apr 2020 22:42)  HR: 73 (04 Apr 2020 10:37) (73 - 84)  BP: 164/76 (04 Apr 2020 10:37) (137/59 - 169/86)  BP(mean): --  RR: 40 (04 Apr 2020 10:37) (20 - 40)  SpO2: 89% (04 Apr 2020 10:37) (89% - 94%)  CONSTITUTIONAL: NAD, well-developed  RESPIRATORY: Normal respiratory effort; lungs are clear to auscultation bilaterally  CARDIOVASCULAR: Regular rate and rhythm, No lower extremity edema  ABDOMEN: Nontender to palpation, normoactive bowel sounds, no rebound/guarding  PSYCH/NEURO: A+O; affect appropriate  SKIN: No rashes; no palpable lesions    LABS:                        15.9   11.89 )-----------( 249      ( 04 Apr 2020 05:38 )             47.3     04-04    139  |  94<L>  |  22  ----------------------------<  223<H>  3.8   |  30  |  0.52    Ca    9.4      04 Apr 2020 05:38  Phos  2.0     04-04  Mg     2.4     04-04    TPro  8.2  /  Alb  3.3  /  TBili  0.6  /  DBili  x   /  AST  53<H>  /  ALT  21  /  AlkPhos  71  04-04              COVID-19 PCR: Detected (04-01-20 @ 10:54)      RADIOLOGY & ADDITIONAL TESTS:  Imaging from Last 24 Hours:    Electrocardiogram/QTc Interval:    Case discussed with: Medicine Progress note    Patient is a 79y old  Male who presents with a chief complaint of covid (03 Apr 2020 16:54)      SUBJECTIVE / OVERNIGHT EVENTS:  remained on NRB, respiratory status improves with lasix x 2 days     MEDICATIONS  (STANDING):  aspirin  chewable 81 milliGRAM(s) Oral daily  atorvastatin 20 milliGRAM(s) Oral at bedtime  carvedilol 25 milliGRAM(s) Oral every 12 hours  dextrose 5%. 1000 milliLiter(s) (50 mL/Hr) IV Continuous <Continuous>  dextrose 5%. 1000 milliLiter(s) (50 mL/Hr) IV Continuous <Continuous>  dextrose 50% Injectable 12.5 Gram(s) IV Push once  dextrose 50% Injectable 25 Gram(s) IV Push once  dextrose 50% Injectable 25 Gram(s) IV Push once  dextrose 50% Injectable 12.5 Gram(s) IV Push once  dextrose 50% Injectable 25 Gram(s) IV Push once  dextrose 50% Injectable 25 Gram(s) IV Push once  heparin  Injectable 5000 Unit(s) SubCutaneous every 8 hours  hydroxychloroquine 200 milliGRAM(s) Oral every 12 hours  hydroxychloroquine   Oral   insulin glargine Injectable (LANTUS) 16 Unit(s) SubCutaneous at bedtime  insulin lispro (HumaLOG) corrective regimen sliding scale   SubCutaneous three times a day before meals  methylPREDNISolone sodium succinate Injectable 40 milliGRAM(s) IV Push two times a day  thiamine 200 milliGRAM(s) Oral <User Schedule>    MEDICATIONS  (PRN):  acetaminophen   Tablet .. 650 milliGRAM(s) Oral every 4 hours PRN Temp greater or equal to 38.5C (101.3F)  ALBUTerol    90 MICROgram(s) HFA Inhaler 2 Puff(s) Inhalation every 4 hours PRN Shortness of Breath and/or Wheezing  benzocaine 20% Spray 1 Spray(s) Topical three times a day PRN sore throat  dextrose 40% Gel 15 Gram(s) Oral once PRN Blood Glucose LESS THAN 70 milliGRAM(s)/deciLiter  dextrose 40% Gel 15 Gram(s) Oral once PRN Blood Glucose LESS THAN 70 milliGRAM(s)/deciliter  glucagon  Injectable 1 milliGRAM(s) IntraMuscular once PRN Glucose <70 milliGRAM(s)/deciLiter  glucagon  Injectable 1 milliGRAM(s) IntraMuscular once PRN Glucose LESS THAN 70 milligrams/deciliter      CAPILLARY BLOOD GLUCOSE      POCT Blood Glucose.: 221 mg/dL (04 Apr 2020 08:42)  POCT Blood Glucose.: 258 mg/dL (04 Apr 2020 07:20)  POCT Blood Glucose.: 224 mg/dL (03 Apr 2020 21:28)  POCT Blood Glucose.: 232 mg/dL (03 Apr 2020 17:16)  POCT Blood Glucose.: 261 mg/dL (03 Apr 2020 12:37)    I&O's Summary    03 Apr 2020 07:01  -  04 Apr 2020 07:00  --------------------------------------------------------  IN: 0 mL / OUT: 400 mL / NET: -400 mL    04 Apr 2020 07:01  -  04 Apr 2020 10:51  --------------------------------------------------------  IN: 0 mL / OUT: 600 mL / NET: -600 mL        PHYSICAL EXAM:  Vital Signs Last 24 Hrs  T(C): 36.7 (04 Apr 2020 10:37), Max: 36.7 (03 Apr 2020 22:42)  T(F): 98 (04 Apr 2020 10:37), Max: 98.1 (03 Apr 2020 22:42)  HR: 73 (04 Apr 2020 10:37) (73 - 84)  BP: 164/76 (04 Apr 2020 10:37) (137/59 - 169/86)  BP(mean): --  RR: 40 (04 Apr 2020 10:37) (20 - 40)  SpO2: 89% (04 Apr 2020 10:37) (89% - 94%)  CONSTITUTIONAL: NAD, well-developed  RESPIRATORY: Normal respiratory effort; on NRB  CARDIOVASCULAR: Regular rate and rhythm, No lower extremity edema     LABS:                        15.9   11.89 )-----------( 249      ( 04 Apr 2020 05:38 )             47.3     04-04    139  |  94<L>  |  22  ----------------------------<  223<H>  3.8   |  30  |  0.52    Ca    9.4      04 Apr 2020 05:38  Phos  2.0     04-04  Mg     2.4     04-04    TPro  8.2  /  Alb  3.3  /  TBili  0.6  /  DBili  x   /  AST  53<H>  /  ALT  21  /  AlkPhos  71  04-04              COVID-19 PCR: Detected (04-01-20 @ 10:54)      RADIOLOGY & ADDITIONAL TESTS:  Imaging from Last 24 Hours:    Electrocardiogram/QTc Interval:    Case discussed with: PA

## 2020-04-04 NOTE — PROGRESS NOTE ADULT - ASSESSMENT
Problem/Plan - 1:  ·  Problem: Hypoxia.  Plan: acute hypoxic respiratory failure due to viral pneumonia with right upper opacity > left   ABX d/marisel   - continue hydroxychloroquine   - continue NRB , may need proning   - pt wife was contacted overnight and he remains full code  will readdress tomorrow      Problem/Plan - 2:  ·  Problem: Viral syndrome.  Plan: COVID 19 + pneumonia with high inflammatory markers and high BNP  - solumedrol 35 mg IV BID  x 72 hours  - if inflammatory markers do not improve- consider IL1 inhibitor  - fu BNP, add another dose of Lasix 20 mg in am tomorrow if needed      Problem/Plan - 3:  ·  Problem: R/O Hyponatremia. Plan: Mild Hyponatremia - 134>> improved to 139 following treatment   - urine lites  - monitor BMP.     Problem/Plan - 4:  ·  Problem: Essential hypertension. Plan: continue coreg   - restart lisinopril if remains htn.     Problem/Plan - 5:  ·  Problem: DVT prophylaxis.  Plan: Heparin 5000 sq q8h.

## 2020-04-05 PROCEDURE — 99233 SBSQ HOSP IP/OBS HIGH 50: CPT

## 2020-04-05 RX ORDER — LANOLIN ALCOHOL/MO/W.PET/CERES
3 CREAM (GRAM) TOPICAL ONCE
Refills: 0 | Status: COMPLETED | OUTPATIENT
Start: 2020-04-05 | End: 2020-04-05

## 2020-04-05 RX ORDER — FUROSEMIDE 40 MG
40 TABLET ORAL ONCE
Refills: 0 | Status: COMPLETED | OUTPATIENT
Start: 2020-04-05 | End: 2020-04-05

## 2020-04-05 RX ORDER — ANAKINRA 100MG/0.67
100 SYRINGE (ML) SUBCUTANEOUS EVERY 6 HOURS
Refills: 0 | Status: COMPLETED | OUTPATIENT
Start: 2020-04-05 | End: 2020-04-08

## 2020-04-05 RX ORDER — DEXTROSE 50 % IN WATER 50 %
25 SYRINGE (ML) INTRAVENOUS ONCE
Refills: 0 | Status: COMPLETED | OUTPATIENT
Start: 2020-04-05 | End: 2020-04-05

## 2020-04-05 RX ADMIN — HEPARIN SODIUM 5000 UNIT(S): 5000 INJECTION INTRAVENOUS; SUBCUTANEOUS at 12:50

## 2020-04-05 RX ADMIN — Medication 40 MILLIGRAM(S): at 00:40

## 2020-04-05 RX ADMIN — HEPARIN SODIUM 5000 UNIT(S): 5000 INJECTION INTRAVENOUS; SUBCUTANEOUS at 04:28

## 2020-04-05 RX ADMIN — CARVEDILOL PHOSPHATE 25 MILLIGRAM(S): 80 CAPSULE, EXTENDED RELEASE ORAL at 04:27

## 2020-04-05 RX ADMIN — Medication 3 MILLIGRAM(S): at 04:27

## 2020-04-05 RX ADMIN — Medication 2: at 17:34

## 2020-04-05 RX ADMIN — Medication 200 MILLIGRAM(S): at 04:27

## 2020-04-05 RX ADMIN — Medication 40 MILLIGRAM(S): at 04:27

## 2020-04-05 RX ADMIN — Medication 100 MILLIGRAM(S): at 15:13

## 2020-04-05 RX ADMIN — Medication 200 MILLIGRAM(S): at 17:30

## 2020-04-05 RX ADMIN — INSULIN GLARGINE 16 UNIT(S): 100 INJECTION, SOLUTION SUBCUTANEOUS at 23:47

## 2020-04-05 RX ADMIN — Medication 40 MILLIGRAM(S): at 17:32

## 2020-04-05 RX ADMIN — ATORVASTATIN CALCIUM 20 MILLIGRAM(S): 80 TABLET, FILM COATED ORAL at 22:04

## 2020-04-05 RX ADMIN — Medication 200 MILLIGRAM(S): at 17:33

## 2020-04-05 RX ADMIN — CARVEDILOL PHOSPHATE 25 MILLIGRAM(S): 80 CAPSULE, EXTENDED RELEASE ORAL at 17:32

## 2020-04-05 RX ADMIN — Medication 81 MILLIGRAM(S): at 12:48

## 2020-04-05 RX ADMIN — HEPARIN SODIUM 5000 UNIT(S): 5000 INJECTION INTRAVENOUS; SUBCUTANEOUS at 22:04

## 2020-04-05 RX ADMIN — Medication 25 MILLILITER(S): at 07:36

## 2020-04-05 RX ADMIN — Medication 100 MILLIGRAM(S): at 22:33

## 2020-04-05 NOTE — PROGRESS NOTE ADULT - ASSESSMENT
Problem/Plan - 1:  ·  Problem: Hypoxia.  Plan: acute hypoxic respiratory failure due to viral pneumonia with right upper opacity > left   ABX d/marisel   - continue hydroxychloroquine   - continue NRB , may need proning   - pt previously desaturated with meals so it was held. as he tolerates liquids will give trial of ensure     he remains full code  will readdress tomorrow      Problem/Plan - 2:  ·  Problem: Viral syndrome.  Plan: COVID 19 + pneumonia with high inflammatory markers and high BNP  - solumedrol 35 mg IV BID  x 72 hours  -Anikinra started today 4/5/20     Problem/Plan - 3:  ·  Problem: R/O Hyponatremia. Plan: Mild Hyponatremia - 134>> improved to 139 following treatment   - urine lites  - monitor BMP.     Problem/Plan - 4:  ·  Problem: Essential hypertension. Plan: continue coreg   - restart lisinopril if remains htn.     Problem/Plan - 5:  ·  Problem: DVT prophylaxis.  Plan: Heparin 5000 sq q8h.

## 2020-04-05 NOTE — PROGRESS NOTE ADULT - SUBJECTIVE AND OBJECTIVE BOX
Medicine Progress note    Patient is a 79y old  Male who presents with a chief complaint of covid r/o (04 Apr 2020 10:51)      SUBJECTIVE / OVERNIGHT EVENTS:  remains on NRB and overnight desaturated. This AM he is more comfortable, speaking full sentences, wants to eat  Spoke with his son regarding current status.  he has been NPO other than meds due to desaturation with meals   son made aware   pt able to tolerate liquids at this time however   MEDICATIONS  (STANDING):  anakinra Injectable 100 milliGRAM(s) SubCutaneous every 6 hours  aspirin  chewable 81 milliGRAM(s) Oral daily  atorvastatin 20 milliGRAM(s) Oral at bedtime  carvedilol 25 milliGRAM(s) Oral every 12 hours  dextrose 5%. 1000 milliLiter(s) (50 mL/Hr) IV Continuous <Continuous>  dextrose 5%. 1000 milliLiter(s) (50 mL/Hr) IV Continuous <Continuous>  dextrose 50% Injectable 12.5 Gram(s) IV Push once  dextrose 50% Injectable 25 Gram(s) IV Push once  dextrose 50% Injectable 25 Gram(s) IV Push once  dextrose 50% Injectable 12.5 Gram(s) IV Push once  dextrose 50% Injectable 25 Gram(s) IV Push once  dextrose 50% Injectable 25 Gram(s) IV Push once  heparin  Injectable 5000 Unit(s) SubCutaneous every 8 hours  hydroxychloroquine 200 milliGRAM(s) Oral every 12 hours  hydroxychloroquine   Oral   insulin glargine Injectable (LANTUS) 16 Unit(s) SubCutaneous at bedtime  insulin lispro (HumaLOG) corrective regimen sliding scale   SubCutaneous three times a day before meals  methylPREDNISolone sodium succinate Injectable 40 milliGRAM(s) IV Push two times a day  thiamine 200 milliGRAM(s) Oral <User Schedule>    MEDICATIONS  (PRN):  acetaminophen   Tablet .. 650 milliGRAM(s) Oral every 4 hours PRN Temp greater or equal to 38.5C (101.3F)  ALBUTerol    90 MICROgram(s) HFA Inhaler 2 Puff(s) Inhalation every 4 hours PRN Shortness of Breath and/or Wheezing  benzocaine 20% Spray 1 Spray(s) Topical three times a day PRN sore throat  dextrose 40% Gel 15 Gram(s) Oral once PRN Blood Glucose LESS THAN 70 milliGRAM(s)/deciLiter  dextrose 40% Gel 15 Gram(s) Oral once PRN Blood Glucose LESS THAN 70 milliGRAM(s)/deciliter  glucagon  Injectable 1 milliGRAM(s) IntraMuscular once PRN Glucose <70 milliGRAM(s)/deciLiter  glucagon  Injectable 1 milliGRAM(s) IntraMuscular once PRN Glucose LESS THAN 70 milligrams/deciliter      CAPILLARY BLOOD GLUCOSE      POCT Blood Glucose.: 139 mg/dL (05 Apr 2020 12:42)  POCT Blood Glucose.: 72 mg/dL (05 Apr 2020 06:20)  POCT Blood Glucose.: 121 mg/dL (04 Apr 2020 23:41)  POCT Blood Glucose.: 161 mg/dL (04 Apr 2020 17:53)    I&O's Summary    04 Apr 2020 07:01  -  05 Apr 2020 07:00  --------------------------------------------------------  IN: 0 mL / OUT: 875 mL / NET: -875 mL    05 Apr 2020 07:01  -  05 Apr 2020 12:49  --------------------------------------------------------  IN: 0 mL / OUT: 150 mL / NET: -150 mL        PHYSICAL EXAM:  Vital Signs Last 24 Hrs  T(C): 36.6 (05 Apr 2020 10:12), Max: 36.6 (04 Apr 2020 22:06)  T(F): 97.8 (05 Apr 2020 10:12), Max: 97.9 (04 Apr 2020 22:06)  HR: 80 (05 Apr 2020 10:12) (72 - 82)  BP: 150/70 (05 Apr 2020 10:12) (148/73 - 156/79)  BP(mean): --  RR: 32 (05 Apr 2020 10:36) (32 - 40)  SpO2: 91% (05 Apr 2020 10:36) (85% - 91%)  CONSTITUTIONAL: NAD  Pulm,breathing comfortably on NRB mask   PSYCH/NEURO: A+O; affect appropriate   but anxious about not eating     LABS:                        15.9   11.89 )-----------( 249      ( 04 Apr 2020 05:38 )             47.3     04-04    139  |  94<L>  |  22  ----------------------------<  223<H>  3.8   |  30  |  0.52    Ca    9.4      04 Apr 2020 05:38  Phos  2.0     04-04  Mg     2.4     04-04    TPro  8.2  /  Alb  3.3  /  TBili  0.6  /  DBili  x   /  AST  53<H>  /  ALT  21  /  AlkPhos  71  04-04              COVID-19 PCR: Detected (04-01-20 @ 10:54)      RADIOLOGY & ADDITIONAL TESTS:  Imaging from Last 24 Hours:    Electrocardiogram/QTc Interval:    Case discussed with: Medicine Progress note    Patient is a 79y old  Male who presents with a chief complaint of covid r/o (04 Apr 2020 10:51)      SUBJECTIVE / OVERNIGHT EVENTS:  remains on NRB and overnight desaturated. This AM he is more comfortable, speaking full sentences, wants to eat  Spoke with his son regarding current status.  he has been NPO other than meds due to desaturation with meals   son made aware   pt able to tolerate liquids at this time however   MEDICATIONS  (STANDING):  anakinra Injectable 100 milliGRAM(s) SubCutaneous every 6 hours  aspirin  chewable 81 milliGRAM(s) Oral daily  atorvastatin 20 milliGRAM(s) Oral at bedtime  carvedilol 25 milliGRAM(s) Oral every 12 hours  dextrose 5%. 1000 milliLiter(s) (50 mL/Hr) IV Continuous <Continuous>  dextrose 5%. 1000 milliLiter(s) (50 mL/Hr) IV Continuous <Continuous>  dextrose 50% Injectable 12.5 Gram(s) IV Push once  dextrose 50% Injectable 25 Gram(s) IV Push once  dextrose 50% Injectable 25 Gram(s) IV Push once  dextrose 50% Injectable 12.5 Gram(s) IV Push once  dextrose 50% Injectable 25 Gram(s) IV Push once  dextrose 50% Injectable 25 Gram(s) IV Push once  heparin  Injectable 5000 Unit(s) SubCutaneous every 8 hours  hydroxychloroquine 200 milliGRAM(s) Oral every 12 hours  hydroxychloroquine   Oral   insulin glargine Injectable (LANTUS) 16 Unit(s) SubCutaneous at bedtime  insulin lispro (HumaLOG) corrective regimen sliding scale   SubCutaneous three times a day before meals  methylPREDNISolone sodium succinate Injectable 40 milliGRAM(s) IV Push two times a day  thiamine 200 milliGRAM(s) Oral <User Schedule>    MEDICATIONS  (PRN):  acetaminophen   Tablet .. 650 milliGRAM(s) Oral every 4 hours PRN Temp greater or equal to 38.5C (101.3F)  ALBUTerol    90 MICROgram(s) HFA Inhaler 2 Puff(s) Inhalation every 4 hours PRN Shortness of Breath and/or Wheezing  benzocaine 20% Spray 1 Spray(s) Topical three times a day PRN sore throat  dextrose 40% Gel 15 Gram(s) Oral once PRN Blood Glucose LESS THAN 70 milliGRAM(s)/deciLiter  dextrose 40% Gel 15 Gram(s) Oral once PRN Blood Glucose LESS THAN 70 milliGRAM(s)/deciliter  glucagon  Injectable 1 milliGRAM(s) IntraMuscular once PRN Glucose <70 milliGRAM(s)/deciLiter  glucagon  Injectable 1 milliGRAM(s) IntraMuscular once PRN Glucose LESS THAN 70 milligrams/deciliter      CAPILLARY BLOOD GLUCOSE      POCT Blood Glucose.: 139 mg/dL (05 Apr 2020 12:42)  POCT Blood Glucose.: 72 mg/dL (05 Apr 2020 06:20)  POCT Blood Glucose.: 121 mg/dL (04 Apr 2020 23:41)  POCT Blood Glucose.: 161 mg/dL (04 Apr 2020 17:53)    I&O's Summary    04 Apr 2020 07:01  -  05 Apr 2020 07:00  --------------------------------------------------------  IN: 0 mL / OUT: 875 mL / NET: -875 mL    05 Apr 2020 07:01  -  05 Apr 2020 12:49  --------------------------------------------------------  IN: 0 mL / OUT: 150 mL / NET: -150 mL        PHYSICAL EXAM:  Vital Signs Last 24 Hrs  T(C): 36.6 (05 Apr 2020 10:12), Max: 36.6 (04 Apr 2020 22:06)  T(F): 97.8 (05 Apr 2020 10:12), Max: 97.9 (04 Apr 2020 22:06)  HR: 80 (05 Apr 2020 10:12) (72 - 82)  BP: 150/70 (05 Apr 2020 10:12) (148/73 - 156/79)  BP(mean): --  RR: 32 (05 Apr 2020 10:36) (32 - 40)  SpO2: 91% (05 Apr 2020 10:36) (85% - 91%)  CONSTITUTIONAL: NAD  Pulm,breathing comfortably on NRB mask   PSYCH/NEURO: A+O; affect appropriate   but anxious about not eating     LABS:                        15.9   11.89 )-----------( 249      ( 04 Apr 2020 05:38 )             47.3     04-04    139  |  94<L>  |  22  ----------------------------<  223<H>  3.8   |  30  |  0.52    Ca    9.4      04 Apr 2020 05:38  Phos  2.0     04-04  Mg     2.4     04-04    TPro  8.2  /  Alb  3.3  /  TBili  0.6  /  DBili  x   /  AST  53<H>  /  ALT  21  /  AlkPhos  71  04-04              COVID-19 PCR: Detected (04-01-20 @ 10:54)      RADIOLOGY & ADDITIONAL TESTS:  Imaging from Last 24 Hours:         Case discussed with:  NP and family

## 2020-04-05 NOTE — CHART NOTE - NSCHARTNOTEFT_GEN_A_CORE
Peristently hypoxic overnight despite the addition of 6L NC to NRB and chest physiotherapy; SpO2 83-88%.  Also given 40 IV Lasix and repositioned with no resolution; IV Solumedrol administered early; will follow.

## 2020-04-06 DIAGNOSIS — J96.01 ACUTE RESPIRATORY FAILURE WITH HYPOXIA: ICD-10-CM

## 2020-04-06 DIAGNOSIS — Z71.89 OTHER SPECIFIED COUNSELING: ICD-10-CM

## 2020-04-06 DIAGNOSIS — U07.1 COVID-19: ICD-10-CM

## 2020-04-06 LAB
ALBUMIN SERPL ELPH-MCNC: 3.1 G/DL — LOW (ref 3.3–5)
ALP SERPL-CCNC: 83 U/L — SIGNIFICANT CHANGE UP (ref 40–120)
ALT FLD-CCNC: 22 U/L — SIGNIFICANT CHANGE UP (ref 4–41)
ANION GAP SERPL CALC-SCNC: 26 MMO/L — HIGH (ref 7–14)
AST SERPL-CCNC: 42 U/L — HIGH (ref 4–40)
BILIRUB SERPL-MCNC: 0.9 MG/DL — SIGNIFICANT CHANGE UP (ref 0.2–1.2)
BUN SERPL-MCNC: 37 MG/DL — HIGH (ref 7–23)
CALCIUM SERPL-MCNC: 8.8 MG/DL — SIGNIFICANT CHANGE UP (ref 8.4–10.5)
CHLORIDE SERPL-SCNC: 97 MMOL/L — LOW (ref 98–107)
CO2 SERPL-SCNC: 21 MMOL/L — LOW (ref 22–31)
CREAT SERPL-MCNC: 0.68 MG/DL — SIGNIFICANT CHANGE UP (ref 0.5–1.3)
FERRITIN SERPL-MCNC: 1544 NG/ML — HIGH (ref 30–400)
GLUCOSE BLDC GLUCOMTR-MCNC: 224 MG/DL — HIGH (ref 70–99)
GLUCOSE BLDC GLUCOMTR-MCNC: 263 MG/DL — HIGH (ref 70–99)
GLUCOSE BLDC GLUCOMTR-MCNC: 275 MG/DL — HIGH (ref 70–99)
GLUCOSE SERPL-MCNC: 267 MG/DL — HIGH (ref 70–99)
HCT VFR BLD CALC: 51.2 % — HIGH (ref 39–50)
HGB BLD-MCNC: 16.9 G/DL — SIGNIFICANT CHANGE UP (ref 13–17)
LDH SERPL L TO P-CCNC: 726 U/L — HIGH (ref 135–225)
MAGNESIUM SERPL-MCNC: 2.8 MG/DL — HIGH (ref 1.6–2.6)
MCHC RBC-ENTMCNC: 31 PG — SIGNIFICANT CHANGE UP (ref 27–34)
MCHC RBC-ENTMCNC: 33 % — SIGNIFICANT CHANGE UP (ref 32–36)
MCV RBC AUTO: 93.8 FL — SIGNIFICANT CHANGE UP (ref 80–100)
NRBC # FLD: 0 K/UL — SIGNIFICANT CHANGE UP (ref 0–0)
PHOSPHATE SERPL-MCNC: 3.2 MG/DL — SIGNIFICANT CHANGE UP (ref 2.5–4.5)
PLATELET # BLD AUTO: 304 K/UL — SIGNIFICANT CHANGE UP (ref 150–400)
PMV BLD: 10.9 FL — SIGNIFICANT CHANGE UP (ref 7–13)
POTASSIUM SERPL-MCNC: 4.6 MMOL/L — SIGNIFICANT CHANGE UP (ref 3.5–5.3)
POTASSIUM SERPL-SCNC: 4.6 MMOL/L — SIGNIFICANT CHANGE UP (ref 3.5–5.3)
PROT SERPL-MCNC: 7.3 G/DL — SIGNIFICANT CHANGE UP (ref 6–8.3)
RBC # BLD: 5.46 M/UL — SIGNIFICANT CHANGE UP (ref 4.2–5.8)
RBC # FLD: 12.3 % — SIGNIFICANT CHANGE UP (ref 10.3–14.5)
SODIUM SERPL-SCNC: 144 MMOL/L — SIGNIFICANT CHANGE UP (ref 135–145)
WBC # BLD: 9.78 K/UL — SIGNIFICANT CHANGE UP (ref 3.8–10.5)
WBC # FLD AUTO: 9.78 K/UL — SIGNIFICANT CHANGE UP (ref 3.8–10.5)

## 2020-04-06 PROCEDURE — 99233 SBSQ HOSP IP/OBS HIGH 50: CPT

## 2020-04-06 RX ORDER — MORPHINE SULFATE 50 MG/1
1 CAPSULE, EXTENDED RELEASE ORAL EVERY 4 HOURS
Refills: 0 | Status: DISCONTINUED | OUTPATIENT
Start: 2020-04-06 | End: 2020-04-09

## 2020-04-06 RX ORDER — ENOXAPARIN SODIUM 100 MG/ML
40 INJECTION SUBCUTANEOUS DAILY
Refills: 0 | Status: DISCONTINUED | OUTPATIENT
Start: 2020-04-06 | End: 2020-04-13

## 2020-04-06 RX ORDER — INSULIN LISPRO 100/ML
4 VIAL (ML) SUBCUTANEOUS
Refills: 0 | Status: DISCONTINUED | OUTPATIENT
Start: 2020-04-06 | End: 2020-04-12

## 2020-04-06 RX ADMIN — ATORVASTATIN CALCIUM 20 MILLIGRAM(S): 80 TABLET, FILM COATED ORAL at 22:31

## 2020-04-06 RX ADMIN — Medication 100 MILLIGRAM(S): at 04:22

## 2020-04-06 RX ADMIN — Medication 3: at 09:32

## 2020-04-06 RX ADMIN — Medication 2: at 17:25

## 2020-04-06 RX ADMIN — Medication 200 MILLIGRAM(S): at 07:48

## 2020-04-06 RX ADMIN — Medication 100 MILLIGRAM(S): at 10:11

## 2020-04-06 RX ADMIN — Medication 40 MILLIGRAM(S): at 06:25

## 2020-04-06 RX ADMIN — HEPARIN SODIUM 5000 UNIT(S): 5000 INJECTION INTRAVENOUS; SUBCUTANEOUS at 06:25

## 2020-04-06 RX ADMIN — Medication 200 MILLIGRAM(S): at 06:25

## 2020-04-06 RX ADMIN — Medication 81 MILLIGRAM(S): at 10:10

## 2020-04-06 RX ADMIN — Medication 3: at 12:29

## 2020-04-06 RX ADMIN — CARVEDILOL PHOSPHATE 25 MILLIGRAM(S): 80 CAPSULE, EXTENDED RELEASE ORAL at 17:36

## 2020-04-06 RX ADMIN — ENOXAPARIN SODIUM 40 MILLIGRAM(S): 100 INJECTION SUBCUTANEOUS at 15:31

## 2020-04-06 RX ADMIN — INSULIN GLARGINE 16 UNIT(S): 100 INJECTION, SOLUTION SUBCUTANEOUS at 22:39

## 2020-04-06 RX ADMIN — Medication 100 MILLIGRAM(S): at 22:31

## 2020-04-06 RX ADMIN — Medication 4 UNIT(S): at 17:25

## 2020-04-06 RX ADMIN — CARVEDILOL PHOSPHATE 25 MILLIGRAM(S): 80 CAPSULE, EXTENDED RELEASE ORAL at 06:25

## 2020-04-06 RX ADMIN — Medication 100 MILLIGRAM(S): at 17:24

## 2020-04-06 RX ADMIN — Medication 40 MILLIGRAM(S): at 17:36

## 2020-04-06 NOTE — PROGRESS NOTE ADULT - PROBLEM SELECTOR PLAN 3
Spoke with patient and his wife over the phone regarding advance directives. Patient would still like to have CPR done in the event that his heart stops, however he states that he does not want to be on a breathing machine. MOLST form filled out and placed in chart. Patient and his wife are aware that he is critically ill and may not survive this illness. They would like to continue all measures for now. Will arrange for patient's wife to Facetime with patient today if possible.

## 2020-04-06 NOTE — PROGRESS NOTE ADULT - PROBLEM SELECTOR PLAN 1
CXR with right upper opacity > left   ABX d/marisel- low suspicion for bacterial superimposed PNA  - completed 6 days of hydroxychloroquine   - continue NRB, proning if oxygen saturation continues to drop  - morphine 1mg PRN for respiratory distress  - albuterol PRN

## 2020-04-06 NOTE — PROGRESS NOTE ADULT - ASSESSMENT
Problem/Plan - 1:  ·  Problem: Hypoxia.  Plan: acute hypoxic respiratory failure due to viral pneumonia with right upper opacity > left   ABX d/marisel   - continue hydroxychloroquine   - continue NRB , may need proning   - pt previously desaturated with meals so it was held. as he tolerates liquids will give trial of ensure     he remains full code  will readdress tomorrow      Problem/Plan - 2:  ·  Problem: Viral syndrome.  Plan: COVID 19 + pneumonia with high inflammatory markers and high BNP  - solumedrol 35 mg IV BID  x 72 hours  -Anikinra started today 4/5/20     Problem/Plan - 3:  ·  Problem: R/O Hyponatremia. Plan: Mild Hyponatremia - 134>> improved to 139 following treatment   - urine lites  - monitor BMP.     Problem/Plan - 4:  ·  Problem: Essential hypertension. Plan: continue coreg   - restart lisinopril if remains htn.     Problem/Plan - 5:  ·  Problem: DVT prophylaxis.  Plan: Heparin 5000 sq q8h. 78 y/o male pmh htn, hld, CAD s/p CABG admitted for COVID19 with Acute hypoxic respiratory failure and viral pneumonia.

## 2020-04-06 NOTE — PROGRESS NOTE ADULT - SUBJECTIVE AND OBJECTIVE BOX
Karli Shankar  [facility]  Pager #[phone]    Patient is a 79y old  Male who presents with a chief complaint of covid r/o (05 Apr 2020 12:48)      SUBJECTIVE / OVERNIGHT EVENTS: Patient seen and examined at bedside. Patient awake and alert on nonrebreather mask, speaking with his wife on the phone. Does not appear to be in respiratory distress, has mild tachypnea. Denies having any pain. He is amenable to starting morphine as needed for SOB. Pulse oximetry reads 88-90% while at rest, drops to 82% when he takes off NRB to speak on the phone.    ADDITIONAL REVIEW OF SYSTEMS:    MEDICATIONS  (STANDING):  anakinra Injectable 100 milliGRAM(s) SubCutaneous every 6 hours  aspirin  chewable 81 milliGRAM(s) Oral daily  atorvastatin 20 milliGRAM(s) Oral at bedtime  carvedilol 25 milliGRAM(s) Oral every 12 hours  dextrose 5%. 1000 milliLiter(s) (50 mL/Hr) IV Continuous <Continuous>  dextrose 5%. 1000 milliLiter(s) (50 mL/Hr) IV Continuous <Continuous>  dextrose 50% Injectable 12.5 Gram(s) IV Push once  dextrose 50% Injectable 25 Gram(s) IV Push once  dextrose 50% Injectable 25 Gram(s) IV Push once  dextrose 50% Injectable 12.5 Gram(s) IV Push once  dextrose 50% Injectable 25 Gram(s) IV Push once  dextrose 50% Injectable 25 Gram(s) IV Push once  enoxaparin Injectable 40 milliGRAM(s) SubCutaneous daily  insulin glargine Injectable (LANTUS) 16 Unit(s) SubCutaneous at bedtime  insulin lispro (HumaLOG) corrective regimen sliding scale   SubCutaneous three times a day before meals  insulin lispro Injectable (HumaLOG) 4 Unit(s) SubCutaneous three times a day before meals  methylPREDNISolone sodium succinate Injectable 40 milliGRAM(s) IV Push two times a day    MEDICATIONS  (PRN):  acetaminophen   Tablet .. 650 milliGRAM(s) Oral every 4 hours PRN Temp greater or equal to 38.5C (101.3F)  ALBUTerol    90 MICROgram(s) HFA Inhaler 2 Puff(s) Inhalation every 4 hours PRN Shortness of Breath and/or Wheezing  benzocaine 20% Spray 1 Spray(s) Topical three times a day PRN sore throat  dextrose 40% Gel 15 Gram(s) Oral once PRN Blood Glucose LESS THAN 70 milliGRAM(s)/deciLiter  dextrose 40% Gel 15 Gram(s) Oral once PRN Blood Glucose LESS THAN 70 milliGRAM(s)/deciliter  glucagon  Injectable 1 milliGRAM(s) IntraMuscular once PRN Glucose <70 milliGRAM(s)/deciLiter  glucagon  Injectable 1 milliGRAM(s) IntraMuscular once PRN Glucose LESS THAN 70 milligrams/deciliter      CAPILLARY BLOOD GLUCOSE      POCT Blood Glucose.: 275 mg/dL (06 Apr 2020 12:17)  POCT Blood Glucose.: 294 mg/dL (06 Apr 2020 09:12)  POCT Blood Glucose.: 275 mg/dL (06 Apr 2020 07:34)  POCT Blood Glucose.: 239 mg/dL (05 Apr 2020 23:11)  POCT Blood Glucose.: 224 mg/dL (05 Apr 2020 17:16)    I&O's Summary    05 Apr 2020 07:01  -  06 Apr 2020 07:00  --------------------------------------------------------  IN: 0 mL / OUT: 550 mL / NET: -550 mL        PHYSICAL EXAM:    Vital Signs Last 24 Hrs  T(C): 36.3 (06 Apr 2020 15:21), Max: 36.5 (05 Apr 2020 22:02)  T(F): 97.4 (06 Apr 2020 15:21), Max: 97.7 (05 Apr 2020 22:02)  HR: 70 (06 Apr 2020 15:21) (69 - 79)  BP: 138/67 (06 Apr 2020 15:21) (117/55 - 151/76)  BP(mean): --  RR: 32 (06 Apr 2020 15:21) (32 - 38)  SpO2: 91% (06 Apr 2020 15:21) (84% - 91%)    CONSTITUTIONAL: NAD, well-developed, well-groomed  EYES: Conjunctiva and sclera clear  ENMT: Moist oral mucosa, no pharyngeal injection or exudates  RESPIRATORY: Tachypneic, lungs CTA  CARDIOVASCULAR: Regular rate and rhythm, normal S1 and S2, no murmur/rub/gallop; No lower extremity edema; Peripheral pulses are 2+ bilaterally  ABDOMEN: Nontender to palpation, normoactive bowel sounds, no rebound/guarding  MUSCULOSKELETAL: No clubbing or cyanosis of digits; no joint swelling or tenderness to palpation  PSYCH: A+O to person, place, and time; affect appropriate  NEUROLOGY: CN 2-12 are intact and symmetric; no gross sensory deficits   SKIN: No rashes; no palpable lesions    LABS:                        16.9   9.78  )-----------( 304      ( 06 Apr 2020 06:40 )             51.2     04-06    144  |  97<L>  |  37<H>  ----------------------------<  267<H>  4.6   |  21<L>  |  0.68    Ca    8.8      06 Apr 2020 06:40  Phos  3.2     04-06  Mg     2.8     04-06    TPro  7.3  /  Alb  3.1<L>  /  TBili  0.9  /  DBili  x   /  AST  42<H>  /  ALT  22  /  AlkPhos  83  04-06      RADIOLOGY & ADDITIONAL TESTS: No new imaging  Results Reviewed: Yes  Imaging Personally Reviewed:  Electrocardiogram Personally Reviewed:    COORDINATION OF CARE:  Care Discussed with Consultants/Other Providers [Y/N]:  Prior or Outpatient Records Reviewed [Y/N]:

## 2020-04-07 LAB
ALBUMIN SERPL ELPH-MCNC: 2.9 G/DL — LOW (ref 3.3–5)
ALP SERPL-CCNC: 77 U/L — SIGNIFICANT CHANGE UP (ref 40–120)
ALT FLD-CCNC: 16 U/L — SIGNIFICANT CHANGE UP (ref 4–41)
ANION GAP SERPL CALC-SCNC: 15 MMO/L — HIGH (ref 7–14)
AST SERPL-CCNC: 39 U/L — SIGNIFICANT CHANGE UP (ref 4–40)
BASOPHILS # BLD AUTO: 0.02 K/UL — SIGNIFICANT CHANGE UP (ref 0–0.2)
BASOPHILS NFR BLD AUTO: 0.2 % — SIGNIFICANT CHANGE UP (ref 0–2)
BILIRUB SERPL-MCNC: 0.6 MG/DL — SIGNIFICANT CHANGE UP (ref 0.2–1.2)
BUN SERPL-MCNC: 33 MG/DL — HIGH (ref 7–23)
CALCIUM SERPL-MCNC: 8.5 MG/DL — SIGNIFICANT CHANGE UP (ref 8.4–10.5)
CHLORIDE SERPL-SCNC: 93 MMOL/L — LOW (ref 98–107)
CO2 SERPL-SCNC: 31 MMOL/L — SIGNIFICANT CHANGE UP (ref 22–31)
CREAT SERPL-MCNC: 0.59 MG/DL — SIGNIFICANT CHANGE UP (ref 0.5–1.3)
CRP SERPL-MCNC: 45.3 MG/L — HIGH
EOSINOPHIL # BLD AUTO: 0 K/UL — SIGNIFICANT CHANGE UP (ref 0–0.5)
EOSINOPHIL NFR BLD AUTO: 0 % — SIGNIFICANT CHANGE UP (ref 0–6)
FERRITIN SERPL-MCNC: 1400 NG/ML — HIGH (ref 30–400)
GLUCOSE BLDC GLUCOMTR-MCNC: 169 MG/DL — HIGH (ref 70–99)
GLUCOSE BLDC GLUCOMTR-MCNC: 227 MG/DL — HIGH (ref 70–99)
GLUCOSE BLDC GLUCOMTR-MCNC: 253 MG/DL — HIGH (ref 70–99)
GLUCOSE BLDC GLUCOMTR-MCNC: 310 MG/DL — HIGH (ref 70–99)
GLUCOSE SERPL-MCNC: 248 MG/DL — HIGH (ref 70–99)
HAV IGM SER-ACNC: NONREACTIVE — SIGNIFICANT CHANGE UP
HBV CORE IGM SER-ACNC: NONREACTIVE — SIGNIFICANT CHANGE UP
HBV SURFACE AG SER-ACNC: NONREACTIVE — SIGNIFICANT CHANGE UP
HCT VFR BLD CALC: 49.1 % — SIGNIFICANT CHANGE UP (ref 39–50)
HCV AB S/CO SERPL IA: 0.55 S/CO — SIGNIFICANT CHANGE UP (ref 0–0.99)
HCV AB SERPL-IMP: SIGNIFICANT CHANGE UP
HGB BLD-MCNC: 16 G/DL — SIGNIFICANT CHANGE UP (ref 13–17)
HIV 1+2 AB+HIV1 P24 AG SERPL QL IA: SIGNIFICANT CHANGE UP
IMM GRANULOCYTES NFR BLD AUTO: 0.4 % — SIGNIFICANT CHANGE UP (ref 0–1.5)
LDH SERPL L TO P-CCNC: 607 U/L — HIGH (ref 135–225)
LYMPHOCYTES # BLD AUTO: 0.52 K/UL — LOW (ref 1–3.3)
LYMPHOCYTES # BLD AUTO: 6.4 % — LOW (ref 13–44)
MAGNESIUM SERPL-MCNC: 2.6 MG/DL — SIGNIFICANT CHANGE UP (ref 1.6–2.6)
MCHC RBC-ENTMCNC: 30.8 PG — SIGNIFICANT CHANGE UP (ref 27–34)
MCHC RBC-ENTMCNC: 32.6 % — SIGNIFICANT CHANGE UP (ref 32–36)
MCV RBC AUTO: 94.4 FL — SIGNIFICANT CHANGE UP (ref 80–100)
MONOCYTES # BLD AUTO: 0.24 K/UL — SIGNIFICANT CHANGE UP (ref 0–0.9)
MONOCYTES NFR BLD AUTO: 2.9 % — SIGNIFICANT CHANGE UP (ref 2–14)
NEUTROPHILS # BLD AUTO: 7.36 K/UL — SIGNIFICANT CHANGE UP (ref 1.8–7.4)
NEUTROPHILS NFR BLD AUTO: 90.1 % — HIGH (ref 43–77)
NRBC # FLD: 0 K/UL — SIGNIFICANT CHANGE UP (ref 0–0)
PHOSPHATE SERPL-MCNC: 3 MG/DL — SIGNIFICANT CHANGE UP (ref 2.5–4.5)
PLATELET # BLD AUTO: 336 K/UL — SIGNIFICANT CHANGE UP (ref 150–400)
PMV BLD: 11 FL — SIGNIFICANT CHANGE UP (ref 7–13)
POTASSIUM SERPL-MCNC: 4 MMOL/L — SIGNIFICANT CHANGE UP (ref 3.5–5.3)
POTASSIUM SERPL-SCNC: 4 MMOL/L — SIGNIFICANT CHANGE UP (ref 3.5–5.3)
PROT SERPL-MCNC: 8.2 G/DL — SIGNIFICANT CHANGE UP (ref 6–8.3)
RBC # BLD: 5.2 M/UL — SIGNIFICANT CHANGE UP (ref 4.2–5.8)
RBC # FLD: 12.1 % — SIGNIFICANT CHANGE UP (ref 10.3–14.5)
SODIUM SERPL-SCNC: 139 MMOL/L — SIGNIFICANT CHANGE UP (ref 135–145)
WBC # BLD: 8.17 K/UL — SIGNIFICANT CHANGE UP (ref 3.8–10.5)
WBC # FLD AUTO: 8.17 K/UL — SIGNIFICANT CHANGE UP (ref 3.8–10.5)

## 2020-04-07 PROCEDURE — 99233 SBSQ HOSP IP/OBS HIGH 50: CPT

## 2020-04-07 RX ORDER — INSULIN GLARGINE 100 [IU]/ML
20 INJECTION, SOLUTION SUBCUTANEOUS AT BEDTIME
Refills: 0 | Status: DISCONTINUED | OUTPATIENT
Start: 2020-04-07 | End: 2020-04-12

## 2020-04-07 RX ADMIN — Medication 100 MILLIGRAM(S): at 12:44

## 2020-04-07 RX ADMIN — CARVEDILOL PHOSPHATE 25 MILLIGRAM(S): 80 CAPSULE, EXTENDED RELEASE ORAL at 05:30

## 2020-04-07 RX ADMIN — Medication 40 MILLIGRAM(S): at 18:26

## 2020-04-07 RX ADMIN — ATORVASTATIN CALCIUM 20 MILLIGRAM(S): 80 TABLET, FILM COATED ORAL at 21:10

## 2020-04-07 RX ADMIN — INSULIN GLARGINE 20 UNIT(S): 100 INJECTION, SOLUTION SUBCUTANEOUS at 22:15

## 2020-04-07 RX ADMIN — Medication 4 UNIT(S): at 12:44

## 2020-04-07 RX ADMIN — Medication 100 MILLIGRAM(S): at 05:30

## 2020-04-07 RX ADMIN — CARVEDILOL PHOSPHATE 25 MILLIGRAM(S): 80 CAPSULE, EXTENDED RELEASE ORAL at 18:26

## 2020-04-07 RX ADMIN — ENOXAPARIN SODIUM 40 MILLIGRAM(S): 100 INJECTION SUBCUTANEOUS at 12:45

## 2020-04-07 RX ADMIN — Medication 3: at 12:45

## 2020-04-07 RX ADMIN — Medication 100 MILLIGRAM(S): at 18:23

## 2020-04-07 RX ADMIN — Medication 4 UNIT(S): at 08:58

## 2020-04-07 RX ADMIN — Medication 2: at 08:58

## 2020-04-07 RX ADMIN — Medication 4: at 18:24

## 2020-04-07 RX ADMIN — Medication 81 MILLIGRAM(S): at 12:45

## 2020-04-07 RX ADMIN — Medication 40 MILLIGRAM(S): at 05:30

## 2020-04-07 RX ADMIN — Medication 4 UNIT(S): at 18:25

## 2020-04-07 NOTE — PROGRESS NOTE ADULT - ASSESSMENT
78 y/o male pmh htn, hld, CAD s/p CABG admitted for COVID19 with acute hypoxic respiratory failure and viral pneumonia.

## 2020-04-07 NOTE — PROGRESS NOTE ADULT - PROBLEM SELECTOR PLAN 3
Spoke with patient and his wife over the phone regarding advance directives. Patient would still like to have CPR done in the event that his heart stops, however he states that he does not want to be on a breathing machine. MOLST form filled out and placed in chart. Patient and his wife are aware that he is critically ill and may not survive this illness. They would like to continue all measures for now. Will arrange for patient's wife to Facetime with patient today if possible. Spoke with patient and his wife over the phone regarding advance directives. Patient would still like to have CPR done in the event that his heart stops, however he states that he does not want to be on a breathing machine. MOLST form filled out and placed in chart. Patient and his wife are aware that he is critically ill and may not survive this illness. They would like to continue all measures for now. Patient's wife able to Facetime with him on 4/6.

## 2020-04-07 NOTE — PROGRESS NOTE ADULT - PROBLEM SELECTOR PLAN 1
CXR with b/l opacities  - completed 6 days of hydroxychloroquine   - continue NRB, proning if oxygen saturation continues to drop  - morphine 1mg PRN for respiratory distress  - albuterol PRN

## 2020-04-07 NOTE — PROGRESS NOTE ADULT - SUBJECTIVE AND OBJECTIVE BOX
Karli Shankar  [facility]  Pager #[phone]    Patient is a 79y old  Male who presents with a chief complaint of covid r/o (06 Apr 2020 15:57)      SUBJECTIVE / OVERNIGHT EVENTS: Patient seen and examined at bedside. Patient desaturating to low 80s while supine, improved to 90% while prone. Asking when he can go home. Denies SOB.    ADDITIONAL REVIEW OF SYSTEMS:    MEDICATIONS  (STANDING):  anakinra Injectable 100 milliGRAM(s) SubCutaneous every 6 hours  aspirin  chewable 81 milliGRAM(s) Oral daily  atorvastatin 20 milliGRAM(s) Oral at bedtime  carvedilol 25 milliGRAM(s) Oral every 12 hours  dextrose 5%. 1000 milliLiter(s) (50 mL/Hr) IV Continuous <Continuous>  dextrose 5%. 1000 milliLiter(s) (50 mL/Hr) IV Continuous <Continuous>  dextrose 50% Injectable 12.5 Gram(s) IV Push once  dextrose 50% Injectable 25 Gram(s) IV Push once  dextrose 50% Injectable 25 Gram(s) IV Push once  dextrose 50% Injectable 12.5 Gram(s) IV Push once  dextrose 50% Injectable 25 Gram(s) IV Push once  dextrose 50% Injectable 25 Gram(s) IV Push once  enoxaparin Injectable 40 milliGRAM(s) SubCutaneous daily  insulin glargine Injectable (LANTUS) 16 Unit(s) SubCutaneous at bedtime  insulin lispro (HumaLOG) corrective regimen sliding scale   SubCutaneous three times a day before meals  insulin lispro Injectable (HumaLOG) 4 Unit(s) SubCutaneous three times a day before meals  methylPREDNISolone sodium succinate Injectable 40 milliGRAM(s) IV Push two times a day    MEDICATIONS  (PRN):  acetaminophen   Tablet .. 650 milliGRAM(s) Oral every 4 hours PRN Temp greater or equal to 38.5C (101.3F)  ALBUTerol    90 MICROgram(s) HFA Inhaler 2 Puff(s) Inhalation every 4 hours PRN Shortness of Breath and/or Wheezing  benzocaine 20% Spray 1 Spray(s) Topical three times a day PRN sore throat  dextrose 40% Gel 15 Gram(s) Oral once PRN Blood Glucose LESS THAN 70 milliGRAM(s)/deciLiter  dextrose 40% Gel 15 Gram(s) Oral once PRN Blood Glucose LESS THAN 70 milliGRAM(s)/deciliter  glucagon  Injectable 1 milliGRAM(s) IntraMuscular once PRN Glucose <70 milliGRAM(s)/deciLiter  glucagon  Injectable 1 milliGRAM(s) IntraMuscular once PRN Glucose LESS THAN 70 milligrams/deciliter  morphine  - Injectable 1 milliGRAM(s) IV Push every 4 hours PRN Respiratory distress      CAPILLARY BLOOD GLUCOSE      POCT Blood Glucose.: 253 mg/dL (07 Apr 2020 12:08)  POCT Blood Glucose.: 227 mg/dL (07 Apr 2020 08:22)  POCT Blood Glucose.: 263 mg/dL (06 Apr 2020 22:35)  POCT Blood Glucose.: 224 mg/dL (06 Apr 2020 17:07)    I&O's Summary    06 Apr 2020 07:01  -  07 Apr 2020 07:00  --------------------------------------------------------  IN: 0 mL / OUT: 750 mL / NET: -750 mL    07 Apr 2020 07:01  -  07 Apr 2020 13:26  --------------------------------------------------------  IN: 0 mL / OUT: 240 mL / NET: -240 mL        PHYSICAL EXAM:    Vital Signs Last 24 Hrs  T(C): 37.1 (07 Apr 2020 11:23), Max: 37.1 (07 Apr 2020 11:23)  T(F): 98.8 (07 Apr 2020 11:23), Max: 98.8 (07 Apr 2020 11:23)  HR: 84 (07 Apr 2020 11:23) (70 - 84)  BP: 142/64 (07 Apr 2020 11:23) (125/62 - 142/64)  BP(mean): --  RR: 29 (07 Apr 2020 12:40) (29 - 32)  SpO2: 85% (07 Apr 2020 12:40) (85% - 91%)    CONSTITUTIONAL: NAD, well-developed, well-groomed  EYES: Conjunctiva and sclera clear  ENMT: Moist oral mucosa, no pharyngeal injection or exudates  RESPIRATORY: Tachypneic, lungs with b/l lower lobe crackles  CARDIOVASCULAR: Regular rate and rhythm, normal S1 and S2, no murmur/rub/gallop; No lower extremity edema; Peripheral pulses are 2+ bilaterally  ABDOMEN: Nontender to palpation, normoactive bowel sounds, no rebound/guarding  MUSCULOSKELETAL: No clubbing or cyanosis of digits; no joint swelling or tenderness to palpation  PSYCH: A+O to person, place, and time; affect appropriate  NEUROLOGY: CN 2-12 are intact and symmetric; no gross sensory deficits   SKIN: No rashes; no palpable lesions    LABS:                        16.0   8.17  )-----------( 336      ( 07 Apr 2020 05:22 )             49.1     04-07    139  |  93<L>  |  33<H>  ----------------------------<  248<H>  4.0   |  31  |  0.59    Ca    8.5      07 Apr 2020 05:22  Phos  3.0     04-07  Mg     2.6     04-07    TPro  8.2  /  Alb  2.9<L>  /  TBili  0.6  /  DBili  x   /  AST  39  /  ALT  16  /  AlkPhos  77  04-07      RADIOLOGY & ADDITIONAL TESTS: No new imaging  Results Reviewed: Yes  Imaging Personally Reviewed:  Electrocardiogram Personally Reviewed:    COORDINATION OF CARE:  Care Discussed with Consultants/Other Providers [Y/N]:  Prior or Outpatient Records Reviewed [Y/N]:

## 2020-04-08 LAB
ALBUMIN SERPL ELPH-MCNC: 2.5 G/DL — LOW (ref 3.3–5)
ALP SERPL-CCNC: 91 U/L — SIGNIFICANT CHANGE UP (ref 40–120)
ALT FLD-CCNC: 14 U/L — SIGNIFICANT CHANGE UP (ref 4–41)
ANION GAP SERPL CALC-SCNC: 13 MMO/L — SIGNIFICANT CHANGE UP (ref 7–14)
AST SERPL-CCNC: 35 U/L — SIGNIFICANT CHANGE UP (ref 4–40)
BASOPHILS # BLD AUTO: 0.01 K/UL — SIGNIFICANT CHANGE UP (ref 0–0.2)
BASOPHILS NFR BLD AUTO: 0.1 % — SIGNIFICANT CHANGE UP (ref 0–2)
BASOPHILS NFR SPEC: 0 % — SIGNIFICANT CHANGE UP (ref 0–2)
BILIRUB SERPL-MCNC: 0.8 MG/DL — SIGNIFICANT CHANGE UP (ref 0.2–1.2)
BLASTS # FLD: 0 % — SIGNIFICANT CHANGE UP (ref 0–0)
BUN SERPL-MCNC: 29 MG/DL — HIGH (ref 7–23)
CALCIUM SERPL-MCNC: 8.8 MG/DL — SIGNIFICANT CHANGE UP (ref 8.4–10.5)
CHLORIDE SERPL-SCNC: 96 MMOL/L — LOW (ref 98–107)
CO2 SERPL-SCNC: 31 MMOL/L — SIGNIFICANT CHANGE UP (ref 22–31)
CREAT SERPL-MCNC: 0.57 MG/DL — SIGNIFICANT CHANGE UP (ref 0.5–1.3)
EOSINOPHIL # BLD AUTO: 0 K/UL — SIGNIFICANT CHANGE UP (ref 0–0.5)
EOSINOPHIL NFR BLD AUTO: 0 % — SIGNIFICANT CHANGE UP (ref 0–6)
EOSINOPHIL NFR FLD: 0 % — SIGNIFICANT CHANGE UP (ref 0–6)
GIANT PLATELETS BLD QL SMEAR: PRESENT — SIGNIFICANT CHANGE UP
GLUCOSE BLDC GLUCOMTR-MCNC: 181 MG/DL — HIGH (ref 70–99)
GLUCOSE BLDC GLUCOMTR-MCNC: 217 MG/DL — HIGH (ref 70–99)
GLUCOSE BLDC GLUCOMTR-MCNC: 269 MG/DL — HIGH (ref 70–99)
GLUCOSE BLDC GLUCOMTR-MCNC: 272 MG/DL — HIGH (ref 70–99)
GLUCOSE SERPL-MCNC: 214 MG/DL — HIGH (ref 70–99)
HCT VFR BLD CALC: 46.4 % — SIGNIFICANT CHANGE UP (ref 39–50)
HGB BLD-MCNC: 15.3 G/DL — SIGNIFICANT CHANGE UP (ref 13–17)
IMM GRANULOCYTES NFR BLD AUTO: 0.5 % — SIGNIFICANT CHANGE UP (ref 0–1.5)
LYMPHOCYTES # BLD AUTO: 0.47 K/UL — LOW (ref 1–3.3)
LYMPHOCYTES # BLD AUTO: 5.9 % — LOW (ref 13–44)
LYMPHOCYTES NFR SPEC AUTO: 1.7 % — LOW (ref 13–44)
MAGNESIUM SERPL-MCNC: 2.3 MG/DL — SIGNIFICANT CHANGE UP (ref 1.6–2.6)
MCHC RBC-ENTMCNC: 30.7 PG — SIGNIFICANT CHANGE UP (ref 27–34)
MCHC RBC-ENTMCNC: 33 % — SIGNIFICANT CHANGE UP (ref 32–36)
MCV RBC AUTO: 93 FL — SIGNIFICANT CHANGE UP (ref 80–100)
METAMYELOCYTES # FLD: 0 % — SIGNIFICANT CHANGE UP (ref 0–1)
MONOCYTES # BLD AUTO: 0.24 K/UL — SIGNIFICANT CHANGE UP (ref 0–0.9)
MONOCYTES NFR BLD AUTO: 3 % — SIGNIFICANT CHANGE UP (ref 2–14)
MONOCYTES NFR BLD: 2.6 % — SIGNIFICANT CHANGE UP (ref 2–9)
MYELOCYTES NFR BLD: 0 % — SIGNIFICANT CHANGE UP (ref 0–0)
NEUTROPHIL AB SER-ACNC: 89.6 % — HIGH (ref 43–77)
NEUTROPHILS # BLD AUTO: 7.26 K/UL — SIGNIFICANT CHANGE UP (ref 1.8–7.4)
NEUTROPHILS NFR BLD AUTO: 90.5 % — HIGH (ref 43–77)
NEUTS BAND # BLD: 6.1 % — HIGH (ref 0–6)
NRBC # FLD: 0 K/UL — SIGNIFICANT CHANGE UP (ref 0–0)
OTHER - HEMATOLOGY %: 0 — SIGNIFICANT CHANGE UP
PHOSPHATE SERPL-MCNC: 2.7 MG/DL — SIGNIFICANT CHANGE UP (ref 2.5–4.5)
PLATELET # BLD AUTO: 335 K/UL — SIGNIFICANT CHANGE UP (ref 150–400)
PLATELET COUNT - ESTIMATE: NORMAL — SIGNIFICANT CHANGE UP
PMV BLD: 10.5 FL — SIGNIFICANT CHANGE UP (ref 7–13)
POTASSIUM SERPL-MCNC: 3.8 MMOL/L — SIGNIFICANT CHANGE UP (ref 3.5–5.3)
POTASSIUM SERPL-SCNC: 3.8 MMOL/L — SIGNIFICANT CHANGE UP (ref 3.5–5.3)
PROCALCITONIN SERPL-MCNC: SIGNIFICANT CHANGE UP NG/ML (ref 0.02–0.1)
PROMYELOCYTES # FLD: 0 % — SIGNIFICANT CHANGE UP (ref 0–0)
PROT SERPL-MCNC: 7.3 G/DL — SIGNIFICANT CHANGE UP (ref 6–8.3)
RBC # BLD: 4.99 M/UL — SIGNIFICANT CHANGE UP (ref 4.2–5.8)
RBC # FLD: 12.1 % — SIGNIFICANT CHANGE UP (ref 10.3–14.5)
SODIUM SERPL-SCNC: 140 MMOL/L — SIGNIFICANT CHANGE UP (ref 135–145)
VARIANT LYMPHS # BLD: 0 % — SIGNIFICANT CHANGE UP
WBC # BLD: 8.02 K/UL — SIGNIFICANT CHANGE UP (ref 3.8–10.5)
WBC # FLD AUTO: 8.02 K/UL — SIGNIFICANT CHANGE UP (ref 3.8–10.5)

## 2020-04-08 PROCEDURE — 99233 SBSQ HOSP IP/OBS HIGH 50: CPT

## 2020-04-08 RX ORDER — ANAKINRA 100MG/0.67
100 SYRINGE (ML) SUBCUTANEOUS EVERY 12 HOURS
Refills: 0 | Status: COMPLETED | OUTPATIENT
Start: 2020-04-08 | End: 2020-04-11

## 2020-04-08 RX ADMIN — Medication 100 MILLIGRAM(S): at 00:37

## 2020-04-08 RX ADMIN — Medication 3: at 17:57

## 2020-04-08 RX ADMIN — Medication 2: at 08:31

## 2020-04-08 RX ADMIN — CARVEDILOL PHOSPHATE 25 MILLIGRAM(S): 80 CAPSULE, EXTENDED RELEASE ORAL at 17:58

## 2020-04-08 RX ADMIN — Medication 100 MILLIGRAM(S): at 17:58

## 2020-04-08 RX ADMIN — INSULIN GLARGINE 20 UNIT(S): 100 INJECTION, SOLUTION SUBCUTANEOUS at 21:41

## 2020-04-08 RX ADMIN — ATORVASTATIN CALCIUM 20 MILLIGRAM(S): 80 TABLET, FILM COATED ORAL at 21:41

## 2020-04-08 RX ADMIN — Medication 1: at 13:18

## 2020-04-08 RX ADMIN — Medication 40 MILLIGRAM(S): at 05:41

## 2020-04-08 RX ADMIN — Medication 40 MILLIGRAM(S): at 17:59

## 2020-04-08 RX ADMIN — ENOXAPARIN SODIUM 40 MILLIGRAM(S): 100 INJECTION SUBCUTANEOUS at 13:17

## 2020-04-08 RX ADMIN — CARVEDILOL PHOSPHATE 25 MILLIGRAM(S): 80 CAPSULE, EXTENDED RELEASE ORAL at 05:41

## 2020-04-08 RX ADMIN — Medication 4 UNIT(S): at 13:18

## 2020-04-08 RX ADMIN — Medication 100 MILLIGRAM(S): at 05:41

## 2020-04-08 RX ADMIN — Medication 81 MILLIGRAM(S): at 13:17

## 2020-04-08 RX ADMIN — Medication 4 UNIT(S): at 17:57

## 2020-04-08 RX ADMIN — Medication 4 UNIT(S): at 08:31

## 2020-04-08 NOTE — PROGRESS NOTE ADULT - PROBLEM SELECTOR PLAN 1
CXR with b/l opacities  - completed 6 days of hydroxychloroquine   - continue NRB, proning if oxygen saturation continues to drop  - morphine 1mg PRN for respiratory distress  - albuterol PRN  - DNI

## 2020-04-08 NOTE — PROGRESS NOTE ADULT - SUBJECTIVE AND OBJECTIVE BOX
Karli Shankar  Division of Hospital Medicine  Pager #[phone]    Patient is a 79y old  Male who presents with a chief complaint of covid r/o (07 Apr 2020 13:26)      SUBJECTIVE / OVERNIGHT EVENTS: Patient seen and examined at bedside. Patient this morning found with his oxygen disconnected after he tried to get out of bed on his own with O2 saturation in the 50s. Improved to 90% after several minutes. Patient initially not responsive but now appears to be at baseline mental status, stating "I'm feeling better."     ADDITIONAL REVIEW OF SYSTEMS:    MEDICATIONS  (STANDING):  anakinra Injectable 100 milliGRAM(s) SubCutaneous every 12 hours  aspirin  chewable 81 milliGRAM(s) Oral daily  atorvastatin 20 milliGRAM(s) Oral at bedtime  carvedilol 25 milliGRAM(s) Oral every 12 hours  dextrose 5%. 1000 milliLiter(s) (50 mL/Hr) IV Continuous <Continuous>  dextrose 5%. 1000 milliLiter(s) (50 mL/Hr) IV Continuous <Continuous>  dextrose 50% Injectable 12.5 Gram(s) IV Push once  dextrose 50% Injectable 25 Gram(s) IV Push once  dextrose 50% Injectable 25 Gram(s) IV Push once  dextrose 50% Injectable 12.5 Gram(s) IV Push once  dextrose 50% Injectable 25 Gram(s) IV Push once  dextrose 50% Injectable 25 Gram(s) IV Push once  enoxaparin Injectable 40 milliGRAM(s) SubCutaneous daily  insulin glargine Injectable (LANTUS) 20 Unit(s) SubCutaneous at bedtime  insulin lispro (HumaLOG) corrective regimen sliding scale   SubCutaneous three times a day before meals  insulin lispro Injectable (HumaLOG) 4 Unit(s) SubCutaneous three times a day before meals  methylPREDNISolone sodium succinate Injectable 40 milliGRAM(s) IV Push two times a day    MEDICATIONS  (PRN):  acetaminophen   Tablet .. 650 milliGRAM(s) Oral every 4 hours PRN Temp greater or equal to 38.5C (101.3F)  ALBUTerol    90 MICROgram(s) HFA Inhaler 2 Puff(s) Inhalation every 4 hours PRN Shortness of Breath and/or Wheezing  benzocaine 20% Spray 1 Spray(s) Topical three times a day PRN sore throat  dextrose 40% Gel 15 Gram(s) Oral once PRN Blood Glucose LESS THAN 70 milliGRAM(s)/deciLiter  dextrose 40% Gel 15 Gram(s) Oral once PRN Blood Glucose LESS THAN 70 milliGRAM(s)/deciliter  glucagon  Injectable 1 milliGRAM(s) IntraMuscular once PRN Glucose <70 milliGRAM(s)/deciLiter  glucagon  Injectable 1 milliGRAM(s) IntraMuscular once PRN Glucose LESS THAN 70 milligrams/deciliter  morphine  - Injectable 1 milliGRAM(s) IV Push every 4 hours PRN Respiratory distress      CAPILLARY BLOOD GLUCOSE      POCT Blood Glucose.: 181 mg/dL (08 Apr 2020 12:39)  POCT Blood Glucose.: 217 mg/dL (08 Apr 2020 07:36)  POCT Blood Glucose.: 169 mg/dL (07 Apr 2020 22:01)  POCT Blood Glucose.: 310 mg/dL (07 Apr 2020 17:12)    I&O's Summary    07 Apr 2020 07:01  -  08 Apr 2020 07:00  --------------------------------------------------------  IN: 0 mL / OUT: 690 mL / NET: -690 mL        PHYSICAL EXAM:    Vital Signs Last 24 Hrs  T(C): 36.4 (08 Apr 2020 12:12), Max: 36.4 (07 Apr 2020 21:07)  T(F): 97.5 (08 Apr 2020 12:12), Max: 97.6 (07 Apr 2020 21:07)  HR: 65 (08 Apr 2020 12:12) (65 - 71)  BP: 126/59 (08 Apr 2020 12:12) (126/59 - 143/74)  BP(mean): --  RR: 28 (08 Apr 2020 12:12) (24 - 32)  SpO2: 90% (08 Apr 2020 12:12) (85% - 91%)    CONSTITUTIONAL: NAD, well-developed, well-groomed  EYES: Conjunctiva and sclera clear  ENMT: Moist oral mucosa, no pharyngeal injection or exudates  RESPIRATORY: Tachypneic, lungs with b/l lower lobe crackles  CARDIOVASCULAR: Regular rate and rhythm, normal S1 and S2, no murmur/rub/gallop; No lower extremity edema; Peripheral pulses are 2+ bilaterally  ABDOMEN: Nontender to palpation, normoactive bowel sounds, no rebound/guarding  MUSCULOSKELETAL: No clubbing or cyanosis of digits; no joint swelling or tenderness to palpation  PSYCH: A+O to person, place, and time; affect appropriate  NEUROLOGY: CN 2-12 are intact and symmetric; no gross sensory deficits   SKIN: No rashes; no palpable lesions    LABS:                        15.3   8.02  )-----------( 335      ( 08 Apr 2020 06:45 )             46.4     04-08    140  |  96<L>  |  29<H>  ----------------------------<  214<H>  3.8   |  31  |  0.57    Ca    8.8      08 Apr 2020 06:45  Phos  2.7     04-08  Mg     2.3     04-08    TPro  7.3  /  Alb  2.5<L>  /  TBili  0.8  /  DBili  x   /  AST  35  /  ALT  14  /  AlkPhos  91  04-08    RADIOLOGY & ADDITIONAL TESTS: No new imaging  Results Reviewed: Yes  Imaging Personally Reviewed:  Electrocardiogram Personally Reviewed:    COORDINATION OF CARE:  Care Discussed with Consultants/Other Providers [Y/N]:  Prior or Outpatient Records Reviewed [Y/N]:

## 2020-04-08 NOTE — PROGRESS NOTE ADULT - PROBLEM SELECTOR PLAN 3
Spoke with patient and his wife over the phone regarding advance directives. Patient would still like to have CPR done in the event that his heart stops, however he states that he does not want to be on a breathing machine. MOLST form filled out and placed in chart. Patient and his wife are aware that he is critically ill and may not survive this illness. They would like to continue all measures for now. Patient's wife able to Facetime with him on 4/6.

## 2020-04-08 NOTE — PROGRESS NOTE ADULT - ASSESSMENT
80 y/o male pmh htn, hld, CAD s/p CABG admitted for COVID19 with acute hypoxic respiratory failure and viral pneumonia.

## 2020-04-09 LAB
ALBUMIN SERPL ELPH-MCNC: 2.6 G/DL — LOW (ref 3.3–5)
ALP SERPL-CCNC: 79 U/L — SIGNIFICANT CHANGE UP (ref 40–120)
ALT FLD-CCNC: 13 U/L — SIGNIFICANT CHANGE UP (ref 4–41)
ANION GAP SERPL CALC-SCNC: 14 MMO/L — SIGNIFICANT CHANGE UP (ref 7–14)
AST SERPL-CCNC: 36 U/L — SIGNIFICANT CHANGE UP (ref 4–40)
BASOPHILS # BLD AUTO: 0.01 K/UL — SIGNIFICANT CHANGE UP (ref 0–0.2)
BASOPHILS NFR BLD AUTO: 0.1 % — SIGNIFICANT CHANGE UP (ref 0–2)
BILIRUB SERPL-MCNC: 0.7 MG/DL — SIGNIFICANT CHANGE UP (ref 0.2–1.2)
BUN SERPL-MCNC: 27 MG/DL — HIGH (ref 7–23)
CALCIUM SERPL-MCNC: 9 MG/DL — SIGNIFICANT CHANGE UP (ref 8.4–10.5)
CHLORIDE SERPL-SCNC: 96 MMOL/L — LOW (ref 98–107)
CO2 SERPL-SCNC: 31 MMOL/L — SIGNIFICANT CHANGE UP (ref 22–31)
CREAT SERPL-MCNC: 0.54 MG/DL — SIGNIFICANT CHANGE UP (ref 0.5–1.3)
EOSINOPHIL # BLD AUTO: 0 K/UL — SIGNIFICANT CHANGE UP (ref 0–0.5)
EOSINOPHIL NFR BLD AUTO: 0 % — SIGNIFICANT CHANGE UP (ref 0–6)
GLUCOSE BLDC GLUCOMTR-MCNC: 133 MG/DL — HIGH (ref 70–99)
GLUCOSE BLDC GLUCOMTR-MCNC: 133 MG/DL — HIGH (ref 70–99)
GLUCOSE BLDC GLUCOMTR-MCNC: 135 MG/DL — HIGH (ref 70–99)
GLUCOSE BLDC GLUCOMTR-MCNC: 135 MG/DL — HIGH (ref 70–99)
GLUCOSE SERPL-MCNC: 159 MG/DL — HIGH (ref 70–99)
HCT VFR BLD CALC: 47.5 % — SIGNIFICANT CHANGE UP (ref 39–50)
HGB BLD-MCNC: 15.6 G/DL — SIGNIFICANT CHANGE UP (ref 13–17)
IMM GRANULOCYTES NFR BLD AUTO: 0.3 % — SIGNIFICANT CHANGE UP (ref 0–1.5)
LYMPHOCYTES # BLD AUTO: 0.39 K/UL — LOW (ref 1–3.3)
LYMPHOCYTES # BLD AUTO: 4.5 % — LOW (ref 13–44)
MAGNESIUM SERPL-MCNC: 2.3 MG/DL — SIGNIFICANT CHANGE UP (ref 1.6–2.6)
MCHC RBC-ENTMCNC: 30.8 PG — SIGNIFICANT CHANGE UP (ref 27–34)
MCHC RBC-ENTMCNC: 32.8 % — SIGNIFICANT CHANGE UP (ref 32–36)
MCV RBC AUTO: 93.9 FL — SIGNIFICANT CHANGE UP (ref 80–100)
MONOCYTES # BLD AUTO: 0.23 K/UL — SIGNIFICANT CHANGE UP (ref 0–0.9)
MONOCYTES NFR BLD AUTO: 2.7 % — SIGNIFICANT CHANGE UP (ref 2–14)
NEUTROPHILS # BLD AUTO: 7.95 K/UL — HIGH (ref 1.8–7.4)
NEUTROPHILS NFR BLD AUTO: 92.4 % — HIGH (ref 43–77)
NRBC # FLD: 0 K/UL — SIGNIFICANT CHANGE UP (ref 0–0)
PHOSPHATE SERPL-MCNC: 2.6 MG/DL — SIGNIFICANT CHANGE UP (ref 2.5–4.5)
PLATELET # BLD AUTO: 347 K/UL — SIGNIFICANT CHANGE UP (ref 150–400)
PMV BLD: 10.9 FL — SIGNIFICANT CHANGE UP (ref 7–13)
POTASSIUM SERPL-MCNC: 3.9 MMOL/L — SIGNIFICANT CHANGE UP (ref 3.5–5.3)
POTASSIUM SERPL-SCNC: 3.9 MMOL/L — SIGNIFICANT CHANGE UP (ref 3.5–5.3)
PROT SERPL-MCNC: 7.3 G/DL — SIGNIFICANT CHANGE UP (ref 6–8.3)
RBC # BLD: 5.06 M/UL — SIGNIFICANT CHANGE UP (ref 4.2–5.8)
RBC # FLD: 12 % — SIGNIFICANT CHANGE UP (ref 10.3–14.5)
SODIUM SERPL-SCNC: 141 MMOL/L — SIGNIFICANT CHANGE UP (ref 135–145)
WBC # BLD: 8.61 K/UL — SIGNIFICANT CHANGE UP (ref 3.8–10.5)
WBC # FLD AUTO: 8.61 K/UL — SIGNIFICANT CHANGE UP (ref 3.8–10.5)

## 2020-04-09 PROCEDURE — 99233 SBSQ HOSP IP/OBS HIGH 50: CPT

## 2020-04-09 RX ORDER — MORPHINE SULFATE 50 MG/1
1 CAPSULE, EXTENDED RELEASE ORAL EVERY 4 HOURS
Refills: 0 | Status: DISCONTINUED | OUTPATIENT
Start: 2020-04-09 | End: 2020-04-16

## 2020-04-09 RX ADMIN — CARVEDILOL PHOSPHATE 25 MILLIGRAM(S): 80 CAPSULE, EXTENDED RELEASE ORAL at 19:03

## 2020-04-09 RX ADMIN — Medication 100 MILLIGRAM(S): at 05:29

## 2020-04-09 RX ADMIN — Medication 40 MILLIGRAM(S): at 05:29

## 2020-04-09 RX ADMIN — ENOXAPARIN SODIUM 40 MILLIGRAM(S): 100 INJECTION SUBCUTANEOUS at 15:04

## 2020-04-09 RX ADMIN — CARVEDILOL PHOSPHATE 25 MILLIGRAM(S): 80 CAPSULE, EXTENDED RELEASE ORAL at 05:29

## 2020-04-09 RX ADMIN — INSULIN GLARGINE 20 UNIT(S): 100 INJECTION, SOLUTION SUBCUTANEOUS at 22:39

## 2020-04-09 RX ADMIN — Medication 81 MILLIGRAM(S): at 15:03

## 2020-04-09 RX ADMIN — ATORVASTATIN CALCIUM 20 MILLIGRAM(S): 80 TABLET, FILM COATED ORAL at 22:39

## 2020-04-09 RX ADMIN — Medication 40 MILLIGRAM(S): at 22:38

## 2020-04-09 RX ADMIN — Medication 4 UNIT(S): at 08:26

## 2020-04-09 RX ADMIN — Medication 100 MILLIGRAM(S): at 19:07

## 2020-04-09 NOTE — PROGRESS NOTE ADULT - SUBJECTIVE AND OBJECTIVE BOX
Patient is a 79y old  Male who presents with a chief complaint of covid r/o (08 Apr 2020 15:37)      SUBJECTIVE / OVERNIGHT EVENTS:    MEDICATIONS  (STANDING):  anakinra Injectable 100 milliGRAM(s) SubCutaneous every 12 hours  aspirin  chewable 81 milliGRAM(s) Oral daily  atorvastatin 20 milliGRAM(s) Oral at bedtime  carvedilol 25 milliGRAM(s) Oral every 12 hours  dextrose 5%. 1000 milliLiter(s) (50 mL/Hr) IV Continuous <Continuous>  dextrose 5%. 1000 milliLiter(s) (50 mL/Hr) IV Continuous <Continuous>  dextrose 50% Injectable 12.5 Gram(s) IV Push once  dextrose 50% Injectable 25 Gram(s) IV Push once  dextrose 50% Injectable 25 Gram(s) IV Push once  dextrose 50% Injectable 12.5 Gram(s) IV Push once  dextrose 50% Injectable 25 Gram(s) IV Push once  dextrose 50% Injectable 25 Gram(s) IV Push once  enoxaparin Injectable 40 milliGRAM(s) SubCutaneous daily  insulin glargine Injectable (LANTUS) 20 Unit(s) SubCutaneous at bedtime  insulin lispro (HumaLOG) corrective regimen sliding scale   SubCutaneous three times a day before meals  insulin lispro Injectable (HumaLOG) 4 Unit(s) SubCutaneous three times a day before meals  methylPREDNISolone sodium succinate Injectable 40 milliGRAM(s) IV Push two times a day    MEDICATIONS  (PRN):  acetaminophen   Tablet .. 650 milliGRAM(s) Oral every 4 hours PRN Temp greater or equal to 38.5C (101.3F)  ALBUTerol    90 MICROgram(s) HFA Inhaler 2 Puff(s) Inhalation every 4 hours PRN Shortness of Breath and/or Wheezing  benzocaine 20% Spray 1 Spray(s) Topical three times a day PRN sore throat  dextrose 40% Gel 15 Gram(s) Oral once PRN Blood Glucose LESS THAN 70 milliGRAM(s)/deciLiter  dextrose 40% Gel 15 Gram(s) Oral once PRN Blood Glucose LESS THAN 70 milliGRAM(s)/deciliter  glucagon  Injectable 1 milliGRAM(s) IntraMuscular once PRN Glucose <70 milliGRAM(s)/deciLiter  glucagon  Injectable 1 milliGRAM(s) IntraMuscular once PRN Glucose LESS THAN 70 milligrams/deciliter  morphine  - Injectable 1 milliGRAM(s) IV Push every 4 hours PRN Respiratory distress      Vital Signs Last 24 Hrs  T(C): 36.3 (09 Apr 2020 10:34), Max: 36.7 (08 Apr 2020 17:53)  T(F): 97.4 (09 Apr 2020 10:34), Max: 98 (08 Apr 2020 17:53)  HR: 70 (09 Apr 2020 10:34) (65 - 78)  BP: 133/61 (09 Apr 2020 10:34) (126/59 - 146/72)  BP(mean): --  RR: 30 (09 Apr 2020 10:34) (28 - 32)  SpO2: 92% (09 Apr 2020 10:34) (88% - 92%)  CAPILLARY BLOOD GLUCOSE      POCT Blood Glucose.: 135 mg/dL (09 Apr 2020 07:30)  POCT Blood Glucose.: 269 mg/dL (08 Apr 2020 21:38)  POCT Blood Glucose.: 272 mg/dL (08 Apr 2020 17:13)  POCT Blood Glucose.: 181 mg/dL (08 Apr 2020 12:39)    I&O's Summary    08 Apr 2020 07:01  -  09 Apr 2020 07:00  --------------------------------------------------------  IN: 0 mL / OUT: 350 mL / NET: -350 mL        PHYSICAL EXAM:  GENERAL: NAD, well-developed  HEAD:  Atraumatic, Normocephalic  EYES: EOMI, PERRLA, conjunctiva and sclera clear  NECK: Supple, No JVD  CHEST/LUNG: Clear to auscultation bilaterally; No wheeze  HEART: Regular rate and rhythm; No murmurs, rubs, or gallops  ABDOMEN: Soft, Nontender, Nondistended; Bowel sounds present  EXTREMITIES:  2+ Peripheral Pulses, No clubbing, cyanosis, or edema  PSYCH: AAOx3  NEUROLOGY: non-focal  SKIN: No rashes or lesions    LABS:                        15.6   8.61  )-----------( 347      ( 09 Apr 2020 06:00 )             47.5     04-09    141  |  96<L>  |  27<H>  ----------------------------<  159<H>  3.9   |  31  |  0.54    Ca    9.0      09 Apr 2020 06:00  Phos  2.6     04-09  Mg     2.3     04-09    TPro  7.3  /  Alb  2.6<L>  /  TBili  0.7  /  DBili  x   /  AST  36  /  ALT  13  /  AlkPhos  79  04-09              RADIOLOGY & ADDITIONAL TESTS:    Imaging Personally Reviewed:    Consultant(s) Notes Reviewed:      Care Discussed with Consultants/Other Providers: Patient is a 79y old  Male who presents with a chief complaint of covid r/o (08 Apr 2020 15:37)      SUBJECTIVE / OVERNIGHT EVENTS: patient seen and examined by bedside, no apparent distress noted , on O2 via  NRB mask, denies headache, dizziness, SOB, CP, Palpitations , N/V/D, abdominal pain        MEDICATIONS  (STANDING):  anakinra Injectable 100 milliGRAM(s) SubCutaneous every 12 hours  aspirin  chewable 81 milliGRAM(s) Oral daily  atorvastatin 20 milliGRAM(s) Oral at bedtime  carvedilol 25 milliGRAM(s) Oral every 12 hours  dextrose 5%. 1000 milliLiter(s) (50 mL/Hr) IV Continuous <Continuous>  dextrose 5%. 1000 milliLiter(s) (50 mL/Hr) IV Continuous <Continuous>  dextrose 50% Injectable 12.5 Gram(s) IV Push once  dextrose 50% Injectable 25 Gram(s) IV Push once  dextrose 50% Injectable 25 Gram(s) IV Push once  dextrose 50% Injectable 12.5 Gram(s) IV Push once  dextrose 50% Injectable 25 Gram(s) IV Push once  dextrose 50% Injectable 25 Gram(s) IV Push once  enoxaparin Injectable 40 milliGRAM(s) SubCutaneous daily  insulin glargine Injectable (LANTUS) 20 Unit(s) SubCutaneous at bedtime  insulin lispro (HumaLOG) corrective regimen sliding scale   SubCutaneous three times a day before meals  insulin lispro Injectable (HumaLOG) 4 Unit(s) SubCutaneous three times a day before meals  methylPREDNISolone sodium succinate Injectable 40 milliGRAM(s) IV Push two times a day    MEDICATIONS  (PRN):  acetaminophen   Tablet .. 650 milliGRAM(s) Oral every 4 hours PRN Temp greater or equal to 38.5C (101.3F)  ALBUTerol    90 MICROgram(s) HFA Inhaler 2 Puff(s) Inhalation every 4 hours PRN Shortness of Breath and/or Wheezing  benzocaine 20% Spray 1 Spray(s) Topical three times a day PRN sore throat  dextrose 40% Gel 15 Gram(s) Oral once PRN Blood Glucose LESS THAN 70 milliGRAM(s)/deciLiter  dextrose 40% Gel 15 Gram(s) Oral once PRN Blood Glucose LESS THAN 70 milliGRAM(s)/deciliter  glucagon  Injectable 1 milliGRAM(s) IntraMuscular once PRN Glucose <70 milliGRAM(s)/deciLiter  glucagon  Injectable 1 milliGRAM(s) IntraMuscular once PRN Glucose LESS THAN 70 milligrams/deciliter  morphine  - Injectable 1 milliGRAM(s) IV Push every 4 hours PRN Respiratory distress      Vital Signs Last 24 Hrs  T(C): 36.3 (09 Apr 2020 10:34), Max: 36.7 (08 Apr 2020 17:53)  T(F): 97.4 (09 Apr 2020 10:34), Max: 98 (08 Apr 2020 17:53)  HR: 70 (09 Apr 2020 10:34) (65 - 78)  BP: 133/61 (09 Apr 2020 10:34) (126/59 - 146/72)  BP(mean): --  RR: 30 (09 Apr 2020 10:34) (28 - 32)  SpO2: 92% (09 Apr 2020 10:34) (88% - 92%)  CAPILLARY BLOOD GLUCOSE      POCT Blood Glucose.: 135 mg/dL (09 Apr 2020 07:30)  POCT Blood Glucose.: 269 mg/dL (08 Apr 2020 21:38)  POCT Blood Glucose.: 272 mg/dL (08 Apr 2020 17:13)  POCT Blood Glucose.: 181 mg/dL (08 Apr 2020 12:39)    I&O's Summary    08 Apr 2020 07:01  -  09 Apr 2020 07:00  --------------------------------------------------------  IN: 0 mL / OUT: 350 mL / NET: -350 mL        PHYSICAL EXAM:  GENERAL: NAD, well-developed  HEAD:  Atraumatic, Normocephalic  EYES: EOMI, PERRLA, conjunctiva and sclera clear  CHEST/LUNG: basal crackles  bilaterally; No wheeze  HEART: Regular rate and rhythm;   ABDOMEN: Soft, Nontender, Nondistended; Bowel sounds present  EXTREMITIES:   No clubbing, cyanosis, or edema  PSYCH: calm   NEUROLOGY: non-focal      LABS:                        15.6   8.61  )-----------( 347      ( 09 Apr 2020 06:00 )             47.5     04-09    141  |  96<L>  |  27<H>  ----------------------------<  159<H>  3.9   |  31  |  0.54    Ca    9.0      09 Apr 2020 06:00  Phos  2.6     04-09  Mg     2.3     04-09    TPro  7.3  /  Alb  2.6<L>  /  TBili  0.7  /  DBili  x   /  AST  36  /  ALT  13  /  AlkPhos  79  04-09              RADIOLOGY & ADDITIONAL TESTS:    Imaging Personally Reviewed:    Consultant(s) Notes Reviewed:      Care Discussed with Consultants/Other Providers:

## 2020-04-09 NOTE — DIETITIAN INITIAL EVALUATION ADULT. - OTHER INFO
Pt 78 yo male with HTN/HLD and CAD s/p CABG presented with fever and cough; Pt +COVID positive - per chart review. Unable to conduct a face to face interview or nutrition-focused physical exam due to limited contact restrictions related to Pt's medical condition and isolation precautions. Of note Pt DNI, but NOT DNR; Prone Pt as tolerated per MD note. Collateral obtained from chart review. Of note Pt was NPO for 5 days after admission; Pt on Clear Liquid diet at present; Pt tolerating PO diet well reported. No report of GI distress (nausea/vomiting/diarrhea/constipation) at present. No report of chewing or swallowing difficulties at this time. Will recommend to advance PO diet to Full liquids; Will recommend to change PO supplement to Glucerna shake - 2x daily. Unable to assess weight or diet history. Of note Pt's HbA1c level 9.3% (4/3). Not appropriate for diet education at present. RDN remains available.

## 2020-04-09 NOTE — PROGRESS NOTE ADULT - PROBLEM SELECTOR PLAN 3
Spoke with patient and his wife over the phone regarding advance directives. Patient would still like to have CPR done in the event that his heart stops, however he states that he does not want to be on a breathing machine. MOLST form filled out and placed in chart. Patient and his wife are aware that he is critically ill and may not survive this illness. They would like to continue all measures for now. Patient's wife able to Facetime with him on 4/6. Discussions were done with Patient and his wife over the phone regarding advance directives. Patient would still like to have CPR done in the event that his heart stops, however he states that he does not want to be on a breathing machine. MOLST form filled out and placed in chart. Patient and his wife are aware that he is critically ill and may not survive this illness. They would like to continue all measures for now. Patient's wife able to Facetime with him on 4/6.

## 2020-04-09 NOTE — DIETITIAN INITIAL EVALUATION ADULT. - PERTINENT LABORATORY DATA
(4/9) Cl 96 L, Albumin 2.6 L, BUN 27 H, Glu 159 H;          (4/7) C-RP 45.3 H;           (4/3) HbA1c 9.3% H

## 2020-04-09 NOTE — DIETITIAN INITIAL EVALUATION ADULT. - ADD RECOMMEND
1. Suggest: Advance PO diet to Full liquids, Consistent Carbohydrate, when/if clinically indicated;        2. Rec: Add change PO supplement: Glucerna Therapeutic Nutrition 8oz. 2x daily (will provide additional ~440 Kcals, ~20 gm Protein);         3. Monitor labs, hydration status;

## 2020-04-09 NOTE — DIETITIAN INITIAL EVALUATION ADULT. - PERTINENT MEDS FT
Aspirin, Lovenox, Insulin (Humalog), Insulin (Lantus), Morphine Injectable (PRN), Solu-Medrol, Lipitor,

## 2020-04-10 LAB
ALBUMIN SERPL ELPH-MCNC: 2.4 G/DL — LOW (ref 3.3–5)
ALP SERPL-CCNC: 80 U/L — SIGNIFICANT CHANGE UP (ref 40–120)
ALT FLD-CCNC: 13 U/L — SIGNIFICANT CHANGE UP (ref 4–41)
ANION GAP SERPL CALC-SCNC: 19 MMO/L — HIGH (ref 7–14)
AST SERPL-CCNC: 47 U/L — HIGH (ref 4–40)
BILIRUB SERPL-MCNC: 0.8 MG/DL — SIGNIFICANT CHANGE UP (ref 0.2–1.2)
BUN SERPL-MCNC: 30 MG/DL — HIGH (ref 7–23)
CALCIUM SERPL-MCNC: 9.3 MG/DL — SIGNIFICANT CHANGE UP (ref 8.4–10.5)
CHLORIDE SERPL-SCNC: 97 MMOL/L — LOW (ref 98–107)
CO2 SERPL-SCNC: 24 MMOL/L — SIGNIFICANT CHANGE UP (ref 22–31)
CREAT SERPL-MCNC: 0.53 MG/DL — SIGNIFICANT CHANGE UP (ref 0.5–1.3)
CRP SERPL-MCNC: 25.9 MG/L — HIGH
D DIMER BLD IA.RAPID-MCNC: 3545 NG/ML — SIGNIFICANT CHANGE UP
FERRITIN SERPL-MCNC: 1241 NG/ML — HIGH (ref 30–400)
GLUCOSE BLDC GLUCOMTR-MCNC: 100 MG/DL — HIGH (ref 70–99)
GLUCOSE BLDC GLUCOMTR-MCNC: 114 MG/DL — HIGH (ref 70–99)
GLUCOSE BLDC GLUCOMTR-MCNC: 131 MG/DL — HIGH (ref 70–99)
GLUCOSE BLDC GLUCOMTR-MCNC: 263 MG/DL — HIGH (ref 70–99)
GLUCOSE SERPL-MCNC: 97 MG/DL — SIGNIFICANT CHANGE UP (ref 70–99)
LDH SERPL L TO P-CCNC: 728 U/L — HIGH (ref 135–225)
MAGNESIUM SERPL-MCNC: 2.3 MG/DL — SIGNIFICANT CHANGE UP (ref 1.6–2.6)
PHOSPHATE SERPL-MCNC: 3 MG/DL — SIGNIFICANT CHANGE UP (ref 2.5–4.5)
POTASSIUM SERPL-MCNC: 4.8 MMOL/L — SIGNIFICANT CHANGE UP (ref 3.5–5.3)
POTASSIUM SERPL-SCNC: 4.8 MMOL/L — SIGNIFICANT CHANGE UP (ref 3.5–5.3)
PROCALCITONIN SERPL-MCNC: 0.03 NG/ML — SIGNIFICANT CHANGE UP (ref 0.02–0.1)
PROT SERPL-MCNC: 7.3 G/DL — SIGNIFICANT CHANGE UP (ref 6–8.3)
SODIUM SERPL-SCNC: 140 MMOL/L — SIGNIFICANT CHANGE UP (ref 135–145)

## 2020-04-10 PROCEDURE — 99233 SBSQ HOSP IP/OBS HIGH 50: CPT

## 2020-04-10 RX ORDER — ALBUTEROL 90 UG/1
1 AEROSOL, METERED ORAL EVERY 4 HOURS
Refills: 0 | Status: DISCONTINUED | OUTPATIENT
Start: 2020-04-10 | End: 2020-04-22

## 2020-04-10 RX ADMIN — Medication 4 UNIT(S): at 09:14

## 2020-04-10 RX ADMIN — Medication 100 MILLIGRAM(S): at 06:00

## 2020-04-10 RX ADMIN — ALBUTEROL 1 PUFF(S): 90 AEROSOL, METERED ORAL at 12:28

## 2020-04-10 RX ADMIN — Medication 81 MILLIGRAM(S): at 12:27

## 2020-04-10 RX ADMIN — ALBUTEROL 1 PUFF(S): 90 AEROSOL, METERED ORAL at 22:46

## 2020-04-10 RX ADMIN — Medication 100 MILLIGRAM(S): at 18:18

## 2020-04-10 RX ADMIN — CARVEDILOL PHOSPHATE 25 MILLIGRAM(S): 80 CAPSULE, EXTENDED RELEASE ORAL at 06:00

## 2020-04-10 RX ADMIN — Medication 40 MILLIGRAM(S): at 06:00

## 2020-04-10 RX ADMIN — INSULIN GLARGINE 20 UNIT(S): 100 INJECTION, SOLUTION SUBCUTANEOUS at 22:46

## 2020-04-10 RX ADMIN — CARVEDILOL PHOSPHATE 25 MILLIGRAM(S): 80 CAPSULE, EXTENDED RELEASE ORAL at 18:19

## 2020-04-10 RX ADMIN — Medication 3: at 12:27

## 2020-04-10 RX ADMIN — Medication 40 MILLIGRAM(S): at 18:19

## 2020-04-10 RX ADMIN — ATORVASTATIN CALCIUM 20 MILLIGRAM(S): 80 TABLET, FILM COATED ORAL at 22:46

## 2020-04-10 RX ADMIN — Medication 4 UNIT(S): at 18:18

## 2020-04-10 RX ADMIN — ENOXAPARIN SODIUM 40 MILLIGRAM(S): 100 INJECTION SUBCUTANEOUS at 12:27

## 2020-04-10 RX ADMIN — ALBUTEROL 1 PUFF(S): 90 AEROSOL, METERED ORAL at 18:19

## 2020-04-10 RX ADMIN — Medication 4 UNIT(S): at 12:27

## 2020-04-10 NOTE — PROGRESS NOTE ADULT - PROBLEM SELECTOR PLAN 2
C/w anakinra  Completed 5 days of Plaquenil   s/p  Solumedrol x 5 days, will c/w taper as pt still on NRB mask, will try to titrate down oxygen as tolerated

## 2020-04-10 NOTE — PROGRESS NOTE ADULT - SUBJECTIVE AND OBJECTIVE BOX
Patient is a 79y old  Male who presents with a chief complaint of covid r/o (09 Apr 2020 10:53)      SUBJECTIVE / OVERNIGHT EVENTS: patient seen and examined by bedside, no acute events overnight, still on NRB mask       MEDICATIONS  (STANDING):  ALBUTerol    90 MICROgram(s) HFA Inhaler 1 Puff(s) Inhalation every 4 hours  anakinra Injectable 100 milliGRAM(s) SubCutaneous every 12 hours  aspirin  chewable 81 milliGRAM(s) Oral daily  atorvastatin 20 milliGRAM(s) Oral at bedtime  carvedilol 25 milliGRAM(s) Oral every 12 hours  dextrose 5%. 1000 milliLiter(s) (50 mL/Hr) IV Continuous <Continuous>  dextrose 5%. 1000 milliLiter(s) (50 mL/Hr) IV Continuous <Continuous>  dextrose 50% Injectable 12.5 Gram(s) IV Push once  dextrose 50% Injectable 25 Gram(s) IV Push once  dextrose 50% Injectable 25 Gram(s) IV Push once  dextrose 50% Injectable 12.5 Gram(s) IV Push once  dextrose 50% Injectable 25 Gram(s) IV Push once  dextrose 50% Injectable 25 Gram(s) IV Push once  enoxaparin Injectable 40 milliGRAM(s) SubCutaneous daily  insulin glargine Injectable (LANTUS) 20 Unit(s) SubCutaneous at bedtime  insulin lispro (HumaLOG) corrective regimen sliding scale   SubCutaneous three times a day before meals  insulin lispro Injectable (HumaLOG) 4 Unit(s) SubCutaneous three times a day before meals  methylPREDNISolone sodium succinate Injectable 40 milliGRAM(s) IV Push two times a day    MEDICATIONS  (PRN):  acetaminophen   Tablet .. 650 milliGRAM(s) Oral every 4 hours PRN Temp greater or equal to 38.5C (101.3F)  benzocaine 20% Spray 1 Spray(s) Topical three times a day PRN sore throat  dextrose 40% Gel 15 Gram(s) Oral once PRN Blood Glucose LESS THAN 70 milliGRAM(s)/deciLiter  dextrose 40% Gel 15 Gram(s) Oral once PRN Blood Glucose LESS THAN 70 milliGRAM(s)/deciliter  glucagon  Injectable 1 milliGRAM(s) IntraMuscular once PRN Glucose <70 milliGRAM(s)/deciLiter  glucagon  Injectable 1 milliGRAM(s) IntraMuscular once PRN Glucose LESS THAN 70 milligrams/deciliter  morphine  - Injectable 1 milliGRAM(s) IV Push every 4 hours PRN Respiratory distress      Vital Signs Last 24 Hrs  T(C): 36.4 (10 Apr 2020 11:45), Max: 36.8 (09 Apr 2020 22:38)  T(F): 97.6 (10 Apr 2020 11:45), Max: 98.2 (09 Apr 2020 22:38)  HR: 68 (10 Apr 2020 11:45) (68 - 71)  BP: 124/51 (10 Apr 2020 11:45) (119/69 - 136/60)  BP(mean): --  RR: 28 (10 Apr 2020 11:45) (25 - 30)  SpO2: 90% (10 Apr 2020 11:45) (90% - 91%)  CAPILLARY BLOOD GLUCOSE      POCT Blood Glucose.: 263 mg/dL (10 Apr 2020 12:01)  POCT Blood Glucose.: 114 mg/dL (10 Apr 2020 08:21)  POCT Blood Glucose.: 135 mg/dL (09 Apr 2020 21:41)  POCT Blood Glucose.: 133 mg/dL (09 Apr 2020 17:02)    I&O's Summary    10 Apr 2020 07:01  -  10 Apr 2020 15:07  --------------------------------------------------------  IN: 0 mL / OUT: 200 mL / NET: -200 mL      PHYSICAL EXAM:  GENERAL: NAD,  HEAD:  Atraumatic, Normocephalic  EYES: EOMI, PERRLA, conjunctiva and sclera clear  CHEST/LUNG: basal crackles  bilaterally; No wheeze  HEART: Regular rate and rhythm;   ABDOMEN: Soft, Nontender, Nondistended; Bowel sounds present  EXTREMITIES:   No clubbing, cyanosis, or edema  PSYCH: calm   NEUROLOGY: non-focal    LABS:                        15.6   8.61  )-----------( 347      ( 09 Apr 2020 06:00 )             47.5     04-10    140  |  97<L>  |  30<H>  ----------------------------<  97  4.8   |  24  |  0.53    Ca    9.3      10 Apr 2020 05:08  Phos  3.0     04-10  Mg     2.3     04-10    TPro  7.3  /  Alb  2.4<L>  /  TBili  0.8  /  DBili  x   /  AST  47<H>  /  ALT  13  /  AlkPhos  80  04-10              RADIOLOGY & ADDITIONAL TESTS:    Imaging Personally Reviewed:    Consultant(s) Notes Reviewed:      Care Discussed with Consultants/Other Providers:

## 2020-04-10 NOTE — PROGRESS NOTE ADULT - PROBLEM SELECTOR PLAN 3
Discussions were done with Patient and his wife over the phone regarding advance directives. Patient would still like to have CPR done in the event that his heart stops, however he states that he does not want to be on a breathing machine. MOLST form filled out and placed in chart. Patient and his wife are aware that he is critically ill and may not survive this illness. They would like to continue all measures for now. Patient's wife able to Facetime with him on 4/6.

## 2020-04-10 NOTE — PROGRESS NOTE ADULT - PROBLEM SELECTOR PLAN 1
CXR with b/l opacities  - completed 6 days of hydroxychloroquine , inflammatory markers trending down   -c/w steroids and Anakinra   - continue NRB, proning if oxygen saturation continues to drop  - morphine 1mg PRN for respiratory distress  - albuterol PRN  - DNI

## 2020-04-11 LAB
ALBUMIN SERPL ELPH-MCNC: 2.5 G/DL — LOW (ref 3.3–5)
ALP SERPL-CCNC: 78 U/L — SIGNIFICANT CHANGE UP (ref 40–120)
ALT FLD-CCNC: 14 U/L — SIGNIFICANT CHANGE UP (ref 4–41)
ANION GAP SERPL CALC-SCNC: 9 MMO/L — SIGNIFICANT CHANGE UP (ref 7–14)
AST SERPL-CCNC: 36 U/L — SIGNIFICANT CHANGE UP (ref 4–40)
BILIRUB SERPL-MCNC: 0.7 MG/DL — SIGNIFICANT CHANGE UP (ref 0.2–1.2)
BUN SERPL-MCNC: 26 MG/DL — HIGH (ref 7–23)
CALCIUM SERPL-MCNC: 8.8 MG/DL — SIGNIFICANT CHANGE UP (ref 8.4–10.5)
CHLORIDE SERPL-SCNC: 97 MMOL/L — LOW (ref 98–107)
CO2 SERPL-SCNC: 32 MMOL/L — HIGH (ref 22–31)
CREAT SERPL-MCNC: 0.47 MG/DL — LOW (ref 0.5–1.3)
GLUCOSE BLDC GLUCOMTR-MCNC: 117 MG/DL — HIGH (ref 70–99)
GLUCOSE BLDC GLUCOMTR-MCNC: 126 MG/DL — HIGH (ref 70–99)
GLUCOSE BLDC GLUCOMTR-MCNC: 151 MG/DL — HIGH (ref 70–99)
GLUCOSE BLDC GLUCOMTR-MCNC: 72 MG/DL — SIGNIFICANT CHANGE UP (ref 70–99)
GLUCOSE BLDC GLUCOMTR-MCNC: 76 MG/DL — SIGNIFICANT CHANGE UP (ref 70–99)
GLUCOSE BLDC GLUCOMTR-MCNC: 96 MG/DL — SIGNIFICANT CHANGE UP (ref 70–99)
GLUCOSE SERPL-MCNC: 127 MG/DL — HIGH (ref 70–99)
MAGNESIUM SERPL-MCNC: 2.2 MG/DL — SIGNIFICANT CHANGE UP (ref 1.6–2.6)
PHOSPHATE SERPL-MCNC: 2.6 MG/DL — SIGNIFICANT CHANGE UP (ref 2.5–4.5)
POTASSIUM SERPL-MCNC: 4.4 MMOL/L — SIGNIFICANT CHANGE UP (ref 3.5–5.3)
POTASSIUM SERPL-SCNC: 4.4 MMOL/L — SIGNIFICANT CHANGE UP (ref 3.5–5.3)
PROT SERPL-MCNC: 6.7 G/DL — SIGNIFICANT CHANGE UP (ref 6–8.3)
SODIUM SERPL-SCNC: 138 MMOL/L — SIGNIFICANT CHANGE UP (ref 135–145)

## 2020-04-11 PROCEDURE — 99233 SBSQ HOSP IP/OBS HIGH 50: CPT

## 2020-04-11 RX ORDER — DEXTROSE 50 % IN WATER 50 %
12.5 SYRINGE (ML) INTRAVENOUS ONCE
Refills: 0 | Status: COMPLETED | OUTPATIENT
Start: 2020-04-11 | End: 2020-04-11

## 2020-04-11 RX ORDER — ANAKINRA 100MG/0.67
100 SYRINGE (ML) SUBCUTANEOUS DAILY
Refills: 0 | Status: COMPLETED | OUTPATIENT
Start: 2020-04-12 | End: 2020-04-14

## 2020-04-11 RX ADMIN — Medication 1: at 12:38

## 2020-04-11 RX ADMIN — Medication 4 UNIT(S): at 19:23

## 2020-04-11 RX ADMIN — Medication 12.5 GRAM(S): at 23:57

## 2020-04-11 RX ADMIN — Medication 20 MILLIGRAM(S): at 06:04

## 2020-04-11 RX ADMIN — ALBUTEROL 1 PUFF(S): 90 AEROSOL, METERED ORAL at 21:46

## 2020-04-11 RX ADMIN — Medication 20 MILLIGRAM(S): at 19:23

## 2020-04-11 RX ADMIN — Medication 4 UNIT(S): at 12:38

## 2020-04-11 RX ADMIN — ALBUTEROL 1 PUFF(S): 90 AEROSOL, METERED ORAL at 06:03

## 2020-04-11 RX ADMIN — ENOXAPARIN SODIUM 40 MILLIGRAM(S): 100 INJECTION SUBCUTANEOUS at 12:39

## 2020-04-11 RX ADMIN — Medication 100 MILLIGRAM(S): at 06:04

## 2020-04-11 RX ADMIN — ATORVASTATIN CALCIUM 20 MILLIGRAM(S): 80 TABLET, FILM COATED ORAL at 21:46

## 2020-04-11 RX ADMIN — CARVEDILOL PHOSPHATE 25 MILLIGRAM(S): 80 CAPSULE, EXTENDED RELEASE ORAL at 06:03

## 2020-04-11 RX ADMIN — ALBUTEROL 1 PUFF(S): 90 AEROSOL, METERED ORAL at 19:24

## 2020-04-11 RX ADMIN — ALBUTEROL 1 PUFF(S): 90 AEROSOL, METERED ORAL at 13:47

## 2020-04-11 RX ADMIN — Medication 81 MILLIGRAM(S): at 12:39

## 2020-04-11 RX ADMIN — ALBUTEROL 1 PUFF(S): 90 AEROSOL, METERED ORAL at 10:20

## 2020-04-11 RX ADMIN — CARVEDILOL PHOSPHATE 25 MILLIGRAM(S): 80 CAPSULE, EXTENDED RELEASE ORAL at 19:23

## 2020-04-11 RX ADMIN — Medication 4 UNIT(S): at 10:19

## 2020-04-11 NOTE — PROGRESS NOTE ADULT - PROBLEM SELECTOR PLAN 3
Discussions were held with patient and his wife over the phone regarding advance directives. Patient would still like to have CPR done in the event that his heart stops, however he states that he does not want to be on a breathing machine. MOLST form filled out and placed in chart. Patient and his wife are aware that he is critically ill and may not survive this illness. They would like to continue all measures for now.

## 2020-04-11 NOTE — PROGRESS NOTE ADULT - SUBJECTIVE AND OBJECTIVE BOX
Karli Shankar  [facility]  Pager #[phone]    Patient is a 79y old  Male who presents with a chief complaint of covid r/o (10 Apr 2020 15:06)      SUBJECTIVE / OVERNIGHT EVENTS: Patient seen and examined at bedside. Patient prone, awake and alert, denies SOB. Remains on nonrebreather mask.    ADDITIONAL REVIEW OF SYSTEMS:    MEDICATIONS  (STANDING):  ALBUTerol    90 MICROgram(s) HFA Inhaler 1 Puff(s) Inhalation every 4 hours  aspirin  chewable 81 milliGRAM(s) Oral daily  atorvastatin 20 milliGRAM(s) Oral at bedtime  carvedilol 25 milliGRAM(s) Oral every 12 hours  dextrose 5%. 1000 milliLiter(s) (50 mL/Hr) IV Continuous <Continuous>  dextrose 5%. 1000 milliLiter(s) (50 mL/Hr) IV Continuous <Continuous>  dextrose 50% Injectable 12.5 Gram(s) IV Push once  dextrose 50% Injectable 25 Gram(s) IV Push once  dextrose 50% Injectable 25 Gram(s) IV Push once  dextrose 50% Injectable 12.5 Gram(s) IV Push once  dextrose 50% Injectable 25 Gram(s) IV Push once  dextrose 50% Injectable 25 Gram(s) IV Push once  enoxaparin Injectable 40 milliGRAM(s) SubCutaneous daily  insulin glargine Injectable (LANTUS) 20 Unit(s) SubCutaneous at bedtime  insulin lispro (HumaLOG) corrective regimen sliding scale   SubCutaneous three times a day before meals  insulin lispro Injectable (HumaLOG) 4 Unit(s) SubCutaneous three times a day before meals  methylPREDNISolone sodium succinate Injectable 20 milliGRAM(s) IV Push every 12 hours    MEDICATIONS  (PRN):  acetaminophen   Tablet .. 650 milliGRAM(s) Oral every 4 hours PRN Temp greater or equal to 38.5C (101.3F)  benzocaine 20% Spray 1 Spray(s) Topical three times a day PRN sore throat  dextrose 40% Gel 15 Gram(s) Oral once PRN Blood Glucose LESS THAN 70 milliGRAM(s)/deciLiter  dextrose 40% Gel 15 Gram(s) Oral once PRN Blood Glucose LESS THAN 70 milliGRAM(s)/deciliter  glucagon  Injectable 1 milliGRAM(s) IntraMuscular once PRN Glucose <70 milliGRAM(s)/deciLiter  glucagon  Injectable 1 milliGRAM(s) IntraMuscular once PRN Glucose LESS THAN 70 milligrams/deciliter  morphine  - Injectable 1 milliGRAM(s) IV Push every 4 hours PRN Respiratory distress      CAPILLARY BLOOD GLUCOSE      POCT Blood Glucose.: 151 mg/dL (11 Apr 2020 11:49)  POCT Blood Glucose.: 117 mg/dL (11 Apr 2020 09:35)  POCT Blood Glucose.: 126 mg/dL (11 Apr 2020 08:01)  POCT Blood Glucose.: 100 mg/dL (10 Apr 2020 22:10)  POCT Blood Glucose.: 131 mg/dL (10 Apr 2020 17:20)    I&O's Summary    10 Apr 2020 07:01  -  11 Apr 2020 07:00  --------------------------------------------------------  IN: 0 mL / OUT: 200 mL / NET: -200 mL        PHYSICAL EXAM:    Vital Signs Last 24 Hrs  T(C): 36.6 (11 Apr 2020 12:49), Max: 36.8 (10 Apr 2020 22:36)  T(F): 97.8 (11 Apr 2020 12:49), Max: 98.2 (10 Apr 2020 22:36)  HR: 68 (11 Apr 2020 12:49) (68 - 95)  BP: 141/55 (11 Apr 2020 12:49) (125/71 - 162/60)  BP(mean): --  RR: 20 (11 Apr 2020 12:49) (17 - 24)  SpO2: 90% (11 Apr 2020 12:49) (88% - 93%)    CONSTITUTIONAL: NAD, well-developed, well-groomed, prone in bed  EYES: Conjunctiva and sclera clear  ENMT: Moist oral mucosa, no pharyngeal injection or exudates  RESPIRATORY: lungs with b/l lower lobe crackles  CARDIOVASCULAR: Regular rate and rhythm, normal S1 and S2, no murmur/rub/gallop; No lower extremity edema; Peripheral pulses are 2+ bilaterally  ABDOMEN: Nontender to palpation, normoactive bowel sounds  MUSCULOSKELETAL: No clubbing or cyanosis of digits; no joint swelling or tenderness to palpation  PSYCH: A+O to person, place, and time; affect appropriate  NEUROLOGY: no gross sensory deficits   SKIN: No rashes; no palpable lesions    LABS:    04-11    138  |  97<L>  |  26<H>  ----------------------------<  127<H>  4.4   |  32<H>  |  0.47<L>    Ca    8.8      11 Apr 2020 08:00  Phos  2.6     04-11  Mg     2.2     04-11    TPro  6.7  /  Alb  2.5<L>  /  TBili  0.7  /  DBili  x   /  AST  36  /  ALT  14  /  AlkPhos  78  04-11      RADIOLOGY & ADDITIONAL TESTS: No new imaging  Results Reviewed: Yes  Imaging Personally Reviewed:  Electrocardiogram Personally Reviewed:    COORDINATION OF CARE:  Care Discussed with Consultants/Other Providers [Y/N]:  Prior or Outpatient Records Reviewed [Y/N]:

## 2020-04-11 NOTE — PROGRESS NOTE ADULT - PROBLEM SELECTOR PLAN 1
CXR with b/l opacities  - completed 6 days of hydroxychloroquine, inflammatory markers trending down   - c/w steroids and Anakinra   - continue NRB, proning if oxygen saturation continues to drop  - morphine 1mg PRN for respiratory distress  - albuterol PRN  - DNI

## 2020-04-12 LAB
ALBUMIN SERPL ELPH-MCNC: 2.1 G/DL — LOW (ref 3.3–5)
ALBUMIN SERPL ELPH-MCNC: SIGNIFICANT CHANGE UP G/DL (ref 3.3–5)
ALP SERPL-CCNC: 84 U/L — SIGNIFICANT CHANGE UP (ref 40–120)
ALP SERPL-CCNC: SIGNIFICANT CHANGE UP U/L (ref 40–120)
ALT FLD-CCNC: 14 U/L — SIGNIFICANT CHANGE UP (ref 4–41)
ALT FLD-CCNC: SIGNIFICANT CHANGE UP U/L (ref 4–41)
ANION GAP SERPL CALC-SCNC: 14 MMO/L — SIGNIFICANT CHANGE UP (ref 7–14)
ANION GAP SERPL CALC-SCNC: 25 MMO/L — HIGH (ref 7–14)
AST SERPL-CCNC: 46 U/L — HIGH (ref 4–40)
AST SERPL-CCNC: SIGNIFICANT CHANGE UP U/L (ref 4–40)
BASOPHILS # BLD AUTO: 0.01 K/UL — SIGNIFICANT CHANGE UP (ref 0–0.2)
BASOPHILS NFR BLD AUTO: 0.1 % — SIGNIFICANT CHANGE UP (ref 0–2)
BILIRUB SERPL-MCNC: 0.9 MG/DL — SIGNIFICANT CHANGE UP (ref 0.2–1.2)
BILIRUB SERPL-MCNC: SIGNIFICANT CHANGE UP MG/DL (ref 0.2–1.2)
BUN SERPL-MCNC: 25 MG/DL — HIGH (ref 7–23)
BUN SERPL-MCNC: 26 MG/DL — HIGH (ref 7–23)
CALCIUM SERPL-MCNC: 9 MG/DL — SIGNIFICANT CHANGE UP (ref 8.4–10.5)
CALCIUM SERPL-MCNC: 9.2 MG/DL — SIGNIFICANT CHANGE UP (ref 8.4–10.5)
CHLORIDE SERPL-SCNC: 100 MMOL/L — SIGNIFICANT CHANGE UP (ref 98–107)
CHLORIDE SERPL-SCNC: 96 MMOL/L — LOW (ref 98–107)
CO2 SERPL-SCNC: 15 MMOL/L — LOW (ref 22–31)
CO2 SERPL-SCNC: 26 MMOL/L — SIGNIFICANT CHANGE UP (ref 22–31)
CREAT SERPL-MCNC: 0.42 MG/DL — LOW (ref 0.5–1.3)
CREAT SERPL-MCNC: 0.51 MG/DL — SIGNIFICANT CHANGE UP (ref 0.5–1.3)
EOSINOPHIL # BLD AUTO: 0.02 K/UL — SIGNIFICANT CHANGE UP (ref 0–0.5)
EOSINOPHIL NFR BLD AUTO: 0.3 % — SIGNIFICANT CHANGE UP (ref 0–6)
GLUCOSE BLDC GLUCOMTR-MCNC: 102 MG/DL — HIGH (ref 70–99)
GLUCOSE BLDC GLUCOMTR-MCNC: 119 MG/DL — HIGH (ref 70–99)
GLUCOSE BLDC GLUCOMTR-MCNC: 126 MG/DL — HIGH (ref 70–99)
GLUCOSE BLDC GLUCOMTR-MCNC: 144 MG/DL — HIGH (ref 70–99)
GLUCOSE BLDC GLUCOMTR-MCNC: 209 MG/DL — HIGH (ref 70–99)
GLUCOSE SERPL-MCNC: 101 MG/DL — HIGH (ref 70–99)
GLUCOSE SERPL-MCNC: 113 MG/DL — HIGH (ref 70–99)
HCT VFR BLD CALC: 47.8 % — SIGNIFICANT CHANGE UP (ref 39–50)
HGB BLD-MCNC: 15.5 G/DL — SIGNIFICANT CHANGE UP (ref 13–17)
IMM GRANULOCYTES NFR BLD AUTO: 0.5 % — SIGNIFICANT CHANGE UP (ref 0–1.5)
LYMPHOCYTES # BLD AUTO: 0.32 K/UL — LOW (ref 1–3.3)
LYMPHOCYTES # BLD AUTO: 4.1 % — LOW (ref 13–44)
MAGNESIUM SERPL-MCNC: 2 MG/DL — SIGNIFICANT CHANGE UP (ref 1.6–2.6)
MAGNESIUM SERPL-MCNC: SIGNIFICANT CHANGE UP MG/DL (ref 1.6–2.6)
MCHC RBC-ENTMCNC: 30.5 PG — SIGNIFICANT CHANGE UP (ref 27–34)
MCHC RBC-ENTMCNC: 32.4 % — SIGNIFICANT CHANGE UP (ref 32–36)
MCV RBC AUTO: 94.1 FL — SIGNIFICANT CHANGE UP (ref 80–100)
MONOCYTES # BLD AUTO: 0.16 K/UL — SIGNIFICANT CHANGE UP (ref 0–0.9)
MONOCYTES NFR BLD AUTO: 2 % — SIGNIFICANT CHANGE UP (ref 2–14)
NEUTROPHILS # BLD AUTO: 7.33 K/UL — SIGNIFICANT CHANGE UP (ref 1.8–7.4)
NEUTROPHILS NFR BLD AUTO: 93 % — HIGH (ref 43–77)
NRBC # FLD: 0 K/UL — SIGNIFICANT CHANGE UP (ref 0–0)
PHOSPHATE SERPL-MCNC: 2.7 MG/DL — SIGNIFICANT CHANGE UP (ref 2.5–4.5)
PHOSPHATE SERPL-MCNC: SIGNIFICANT CHANGE UP MG/DL (ref 2.5–4.5)
PLATELET # BLD AUTO: 198 K/UL — SIGNIFICANT CHANGE UP (ref 150–400)
PMV BLD: 11.7 FL — SIGNIFICANT CHANGE UP (ref 7–13)
POTASSIUM SERPL-MCNC: 5.3 MMOL/L — SIGNIFICANT CHANGE UP (ref 3.5–5.3)
POTASSIUM SERPL-MCNC: SIGNIFICANT CHANGE UP MMOL/L (ref 3.5–5.3)
POTASSIUM SERPL-SCNC: 5.3 MMOL/L — SIGNIFICANT CHANGE UP (ref 3.5–5.3)
POTASSIUM SERPL-SCNC: SIGNIFICANT CHANGE UP MMOL/L (ref 3.5–5.3)
PROT SERPL-MCNC: 7.1 G/DL — SIGNIFICANT CHANGE UP (ref 6–8.3)
PROT SERPL-MCNC: SIGNIFICANT CHANGE UP G/DL (ref 6–8.3)
RBC # BLD: 5.08 M/UL — SIGNIFICANT CHANGE UP (ref 4.2–5.8)
RBC # FLD: 11.9 % — SIGNIFICANT CHANGE UP (ref 10.3–14.5)
SODIUM SERPL-SCNC: 136 MMOL/L — SIGNIFICANT CHANGE UP (ref 135–145)
SODIUM SERPL-SCNC: 140 MMOL/L — SIGNIFICANT CHANGE UP (ref 135–145)
WBC # BLD: 7.88 K/UL — SIGNIFICANT CHANGE UP (ref 3.8–10.5)
WBC # FLD AUTO: 7.88 K/UL — SIGNIFICANT CHANGE UP (ref 3.8–10.5)

## 2020-04-12 PROCEDURE — 99233 SBSQ HOSP IP/OBS HIGH 50: CPT

## 2020-04-12 RX ORDER — INSULIN GLARGINE 100 [IU]/ML
15 INJECTION, SOLUTION SUBCUTANEOUS AT BEDTIME
Refills: 0 | Status: DISCONTINUED | OUTPATIENT
Start: 2020-04-12 | End: 2020-04-21

## 2020-04-12 RX ADMIN — ALBUTEROL 1 PUFF(S): 90 AEROSOL, METERED ORAL at 22:02

## 2020-04-12 RX ADMIN — Medication 100 MILLIGRAM(S): at 14:24

## 2020-04-12 RX ADMIN — Medication 4 UNIT(S): at 08:05

## 2020-04-12 RX ADMIN — INSULIN GLARGINE 15 UNIT(S): 100 INJECTION, SOLUTION SUBCUTANEOUS at 23:56

## 2020-04-12 RX ADMIN — Medication 20 MILLIGRAM(S): at 18:11

## 2020-04-12 RX ADMIN — MORPHINE SULFATE 1 MILLIGRAM(S): 50 CAPSULE, EXTENDED RELEASE ORAL at 05:31

## 2020-04-12 RX ADMIN — Medication 81 MILLIGRAM(S): at 13:11

## 2020-04-12 RX ADMIN — CARVEDILOL PHOSPHATE 25 MILLIGRAM(S): 80 CAPSULE, EXTENDED RELEASE ORAL at 05:09

## 2020-04-12 RX ADMIN — INSULIN GLARGINE 20 UNIT(S): 100 INJECTION, SOLUTION SUBCUTANEOUS at 00:20

## 2020-04-12 RX ADMIN — MORPHINE SULFATE 1 MILLIGRAM(S): 50 CAPSULE, EXTENDED RELEASE ORAL at 00:48

## 2020-04-12 RX ADMIN — ALBUTEROL 1 PUFF(S): 90 AEROSOL, METERED ORAL at 18:06

## 2020-04-12 RX ADMIN — CARVEDILOL PHOSPHATE 25 MILLIGRAM(S): 80 CAPSULE, EXTENDED RELEASE ORAL at 18:06

## 2020-04-12 RX ADMIN — Medication 20 MILLIGRAM(S): at 05:09

## 2020-04-12 RX ADMIN — ALBUTEROL 1 PUFF(S): 90 AEROSOL, METERED ORAL at 09:37

## 2020-04-12 RX ADMIN — ENOXAPARIN SODIUM 40 MILLIGRAM(S): 100 INJECTION SUBCUTANEOUS at 13:10

## 2020-04-12 RX ADMIN — ALBUTEROL 1 PUFF(S): 90 AEROSOL, METERED ORAL at 05:08

## 2020-04-12 RX ADMIN — ATORVASTATIN CALCIUM 20 MILLIGRAM(S): 80 TABLET, FILM COATED ORAL at 22:02

## 2020-04-12 RX ADMIN — ALBUTEROL 1 PUFF(S): 90 AEROSOL, METERED ORAL at 13:11

## 2020-04-12 RX ADMIN — ALBUTEROL 1 PUFF(S): 90 AEROSOL, METERED ORAL at 00:20

## 2020-04-12 NOTE — PROGRESS NOTE ADULT - SUBJECTIVE AND OBJECTIVE BOX
Karli Shankar  [facility]  Pager #[phone]    Patient is a 79y old  Male who presents with a chief complaint of covid r/o (11 Apr 2020 14:22)      SUBJECTIVE / OVERNIGHT EVENTS: Patient seen and examined at bedside. Patient on nonrebreather mask on left lateral decubitus position with oxygen saturation 100%. Denies SOB.    ADDITIONAL REVIEW OF SYSTEMS:    MEDICATIONS  (STANDING):  ALBUTerol    90 MICROgram(s) HFA Inhaler 1 Puff(s) Inhalation every 4 hours  anakinra Injectable 100 milliGRAM(s) SubCutaneous daily  aspirin  chewable 81 milliGRAM(s) Oral daily  atorvastatin 20 milliGRAM(s) Oral at bedtime  carvedilol 25 milliGRAM(s) Oral every 12 hours  dextrose 5%. 1000 milliLiter(s) (50 mL/Hr) IV Continuous <Continuous>  dextrose 5%. 1000 milliLiter(s) (50 mL/Hr) IV Continuous <Continuous>  dextrose 50% Injectable 12.5 Gram(s) IV Push once  dextrose 50% Injectable 25 Gram(s) IV Push once  dextrose 50% Injectable 25 Gram(s) IV Push once  dextrose 50% Injectable 12.5 Gram(s) IV Push once  dextrose 50% Injectable 25 Gram(s) IV Push once  dextrose 50% Injectable 25 Gram(s) IV Push once  enoxaparin Injectable 40 milliGRAM(s) SubCutaneous daily  insulin glargine Injectable (LANTUS) 20 Unit(s) SubCutaneous at bedtime  insulin lispro (HumaLOG) corrective regimen sliding scale   SubCutaneous three times a day before meals  methylPREDNISolone sodium succinate Injectable 20 milliGRAM(s) IV Push every 12 hours    MEDICATIONS  (PRN):  acetaminophen   Tablet .. 650 milliGRAM(s) Oral every 4 hours PRN Temp greater or equal to 38.5C (101.3F)  benzocaine 20% Spray 1 Spray(s) Topical three times a day PRN sore throat  dextrose 40% Gel 15 Gram(s) Oral once PRN Blood Glucose LESS THAN 70 milliGRAM(s)/deciLiter  dextrose 40% Gel 15 Gram(s) Oral once PRN Blood Glucose LESS THAN 70 milliGRAM(s)/deciliter  glucagon  Injectable 1 milliGRAM(s) IntraMuscular once PRN Glucose <70 milliGRAM(s)/deciLiter  glucagon  Injectable 1 milliGRAM(s) IntraMuscular once PRN Glucose LESS THAN 70 milligrams/deciliter  morphine  - Injectable 1 milliGRAM(s) IV Push every 4 hours PRN Respiratory distress      CAPILLARY BLOOD GLUCOSE      POCT Blood Glucose.: 126 mg/dL (12 Apr 2020 11:57)  POCT Blood Glucose.: 144 mg/dL (12 Apr 2020 08:01)  POCT Blood Glucose.: 209 mg/dL (12 Apr 2020 00:14)  POCT Blood Glucose.: 76 mg/dL (11 Apr 2020 22:19)  POCT Blood Glucose.: 72 mg/dL (11 Apr 2020 21:17)  POCT Blood Glucose.: 96 mg/dL (11 Apr 2020 17:27)    I&O's Summary    11 Apr 2020 07:01  -  12 Apr 2020 07:00  --------------------------------------------------------  IN: 500 mL / OUT: 825 mL / NET: -325 mL    12 Apr 2020 07:01  -  12 Apr 2020 13:46  --------------------------------------------------------  IN: 0 mL / OUT: 200 mL / NET: -200 mL        PHYSICAL EXAM:    Vital Signs Last 24 Hrs  T(C): 37.1 (12 Apr 2020 09:38), Max: 37.1 (12 Apr 2020 09:38)  T(F): 98.8 (12 Apr 2020 09:38), Max: 98.8 (12 Apr 2020 09:38)  HR: 79 (12 Apr 2020 09:38) (70 - 79)  BP: 117/66 (12 Apr 2020 09:38) (117/66 - 151/62)  BP(mean): --  RR: 20 (12 Apr 2020 09:38) (20 - 22)  SpO2: 91% (12 Apr 2020 09:38) (86% - 91%)    CONSTITUTIONAL: NAD, well-developed, well-groomed  EYES: Conjunctiva and sclera clear  ENMT: Moist oral mucosa  RESPIRATORY: lungs with b/l lower lobe crackles  CARDIOVASCULAR: Regular rate and rhythm, normal S1 and S2, no murmur/rub/gallop; No lower extremity edema; Peripheral pulses are 2+ bilaterally  ABDOMEN: Nontender to palpation, normoactive bowel sounds  MUSCULOSKELETAL: No clubbing or cyanosis of digits; no joint swelling or tenderness to palpation  PSYCH: A+O to person, place, and time; affect appropriate  NEUROLOGY: no gross sensory deficits   SKIN: No rashes; no palpable lesions    LABS:                        15.5   7.88  )-----------( 198      ( 12 Apr 2020 06:01 )             47.8     04-11    138  |  97<L>  |  26<H>  ----------------------------<  127<H>  4.4   |  32<H>  |  0.47<L>    Ca    8.8      11 Apr 2020 08:00  Phos  2.6     04-11  Mg     2.2     04-11    TPro  6.7  /  Alb  2.5<L>  /  TBili  0.7  /  DBili  x   /  AST  36  /  ALT  14  /  AlkPhos  78  04-11      RADIOLOGY & ADDITIONAL TESTS: No new imaging  Results Reviewed: Yes  Imaging Personally Reviewed:  Electrocardiogram Personally Reviewed:    COORDINATION OF CARE:  Care Discussed with Consultants/Other Providers [Y/N]:  Prior or Outpatient Records Reviewed [Y/N]:

## 2020-04-12 NOTE — PROGRESS NOTE ADULT - PROBLEM SELECTOR PLAN 3
A1c 9.3%  FS this AM in 70s- will decrease Lantus to 15 units qHS, d/c Humalog qac  Continue with FS qac and qHS  Low dose ISS  Holding home oral diabetic medications

## 2020-04-12 NOTE — PROGRESS NOTE ADULT - PROBLEM SELECTOR PLAN 1
CXR with b/l opacities  - completed 6 days of hydroxychloroquine, inflammatory markers trending down   - c/w steroids and Anakinra   - continue NRB, proning  - morphine 1mg PRN for respiratory distress  - albuterol PRN  - DNI

## 2020-04-13 LAB
ALBUMIN SERPL ELPH-MCNC: 2.2 G/DL — LOW (ref 3.3–5)
ALP SERPL-CCNC: 83 U/L — SIGNIFICANT CHANGE UP (ref 40–120)
ALT FLD-CCNC: 12 U/L — SIGNIFICANT CHANGE UP (ref 4–41)
ANION GAP SERPL CALC-SCNC: 17 MMO/L — HIGH (ref 7–14)
AST SERPL-CCNC: 37 U/L — SIGNIFICANT CHANGE UP (ref 4–40)
BASOPHILS # BLD AUTO: 0.01 K/UL — SIGNIFICANT CHANGE UP (ref 0–0.2)
BASOPHILS NFR BLD AUTO: 0.1 % — SIGNIFICANT CHANGE UP (ref 0–2)
BILIRUB SERPL-MCNC: 0.9 MG/DL — SIGNIFICANT CHANGE UP (ref 0.2–1.2)
BUN SERPL-MCNC: 29 MG/DL — HIGH (ref 7–23)
CALCIUM SERPL-MCNC: 9 MG/DL — SIGNIFICANT CHANGE UP (ref 8.4–10.5)
CHLORIDE SERPL-SCNC: 97 MMOL/L — LOW (ref 98–107)
CO2 SERPL-SCNC: 25 MMOL/L — SIGNIFICANT CHANGE UP (ref 22–31)
CREAT SERPL-MCNC: 0.57 MG/DL — SIGNIFICANT CHANGE UP (ref 0.5–1.3)
CRP SERPL HS-MCNC: 106.5 MG/L — SIGNIFICANT CHANGE UP
D DIMER BLD IA.RAPID-MCNC: 4119 NG/ML — SIGNIFICANT CHANGE UP
EOSINOPHIL # BLD AUTO: 0.01 K/UL — SIGNIFICANT CHANGE UP (ref 0–0.5)
EOSINOPHIL NFR BLD AUTO: 0.1 % — SIGNIFICANT CHANGE UP (ref 0–6)
FERRITIN SERPL-MCNC: 1612 NG/ML — HIGH (ref 30–400)
GLUCOSE BLDC GLUCOMTR-MCNC: 139 MG/DL — HIGH (ref 70–99)
GLUCOSE BLDC GLUCOMTR-MCNC: 155 MG/DL — HIGH (ref 70–99)
GLUCOSE BLDC GLUCOMTR-MCNC: 183 MG/DL — HIGH (ref 70–99)
GLUCOSE BLDC GLUCOMTR-MCNC: 260 MG/DL — HIGH (ref 70–99)
GLUCOSE BLDC GLUCOMTR-MCNC: 262 MG/DL — HIGH (ref 70–99)
GLUCOSE SERPL-MCNC: 162 MG/DL — HIGH (ref 70–99)
HCT VFR BLD CALC: 48.2 % — SIGNIFICANT CHANGE UP (ref 39–50)
HGB BLD-MCNC: 15.6 G/DL — SIGNIFICANT CHANGE UP (ref 13–17)
IMM GRANULOCYTES NFR BLD AUTO: 0.4 % — SIGNIFICANT CHANGE UP (ref 0–1.5)
LDH SERPL L TO P-CCNC: 564 U/L — HIGH (ref 135–225)
LYMPHOCYTES # BLD AUTO: 0.43 K/UL — LOW (ref 1–3.3)
LYMPHOCYTES # BLD AUTO: 5.2 % — LOW (ref 13–44)
MAGNESIUM SERPL-MCNC: 2.1 MG/DL — SIGNIFICANT CHANGE UP (ref 1.6–2.6)
MCHC RBC-ENTMCNC: 30.5 PG — SIGNIFICANT CHANGE UP (ref 27–34)
MCHC RBC-ENTMCNC: 32.4 % — SIGNIFICANT CHANGE UP (ref 32–36)
MCV RBC AUTO: 94.1 FL — SIGNIFICANT CHANGE UP (ref 80–100)
MONOCYTES # BLD AUTO: 0.16 K/UL — SIGNIFICANT CHANGE UP (ref 0–0.9)
MONOCYTES NFR BLD AUTO: 1.9 % — LOW (ref 2–14)
NEUTROPHILS # BLD AUTO: 7.63 K/UL — HIGH (ref 1.8–7.4)
NEUTROPHILS NFR BLD AUTO: 92.3 % — HIGH (ref 43–77)
NRBC # FLD: 0 K/UL — SIGNIFICANT CHANGE UP (ref 0–0)
PHOSPHATE SERPL-MCNC: 3.5 MG/DL — SIGNIFICANT CHANGE UP (ref 2.5–4.5)
PLATELET # BLD AUTO: 212 K/UL — SIGNIFICANT CHANGE UP (ref 150–400)
PMV BLD: 11.5 FL — SIGNIFICANT CHANGE UP (ref 7–13)
POTASSIUM SERPL-MCNC: 5.1 MMOL/L — SIGNIFICANT CHANGE UP (ref 3.5–5.3)
POTASSIUM SERPL-SCNC: 5.1 MMOL/L — SIGNIFICANT CHANGE UP (ref 3.5–5.3)
PROCALCITONIN SERPL-MCNC: 0.08 NG/ML — SIGNIFICANT CHANGE UP (ref 0.02–0.1)
PROT SERPL-MCNC: 6.9 G/DL — SIGNIFICANT CHANGE UP (ref 6–8.3)
RBC # BLD: 5.12 M/UL — SIGNIFICANT CHANGE UP (ref 4.2–5.8)
RBC # FLD: 11.9 % — SIGNIFICANT CHANGE UP (ref 10.3–14.5)
SODIUM SERPL-SCNC: 139 MMOL/L — SIGNIFICANT CHANGE UP (ref 135–145)
WBC # BLD: 8.27 K/UL — SIGNIFICANT CHANGE UP (ref 3.8–10.5)
WBC # FLD AUTO: 8.27 K/UL — SIGNIFICANT CHANGE UP (ref 3.8–10.5)

## 2020-04-13 PROCEDURE — 99233 SBSQ HOSP IP/OBS HIGH 50: CPT

## 2020-04-13 PROCEDURE — 71045 X-RAY EXAM CHEST 1 VIEW: CPT | Mod: 26

## 2020-04-13 RX ORDER — ENOXAPARIN SODIUM 100 MG/ML
40 INJECTION SUBCUTANEOUS ONCE
Refills: 0 | Status: COMPLETED | OUTPATIENT
Start: 2020-04-13 | End: 2020-04-13

## 2020-04-13 RX ORDER — VANCOMYCIN HCL 1 G
1000 VIAL (EA) INTRAVENOUS ONCE
Refills: 0 | Status: COMPLETED | OUTPATIENT
Start: 2020-04-13 | End: 2020-04-13

## 2020-04-13 RX ORDER — FUROSEMIDE 40 MG
40 TABLET ORAL ONCE
Refills: 0 | Status: COMPLETED | OUTPATIENT
Start: 2020-04-13 | End: 2020-04-13

## 2020-04-13 RX ORDER — PIPERACILLIN AND TAZOBACTAM 4; .5 G/20ML; G/20ML
3.38 INJECTION, POWDER, LYOPHILIZED, FOR SOLUTION INTRAVENOUS ONCE
Refills: 0 | Status: COMPLETED | OUTPATIENT
Start: 2020-04-13 | End: 2020-04-13

## 2020-04-13 RX ORDER — ENOXAPARIN SODIUM 100 MG/ML
80 INJECTION SUBCUTANEOUS
Refills: 0 | Status: DISCONTINUED | OUTPATIENT
Start: 2020-04-14 | End: 2020-04-22

## 2020-04-13 RX ADMIN — Medication 20 MILLIGRAM(S): at 17:28

## 2020-04-13 RX ADMIN — Medication 40 MILLIGRAM(S): at 09:56

## 2020-04-13 RX ADMIN — Medication 100 MILLIGRAM(S): at 13:48

## 2020-04-13 RX ADMIN — ALBUTEROL 1 PUFF(S): 90 AEROSOL, METERED ORAL at 01:41

## 2020-04-13 RX ADMIN — PIPERACILLIN AND TAZOBACTAM 200 GRAM(S): 4; .5 INJECTION, POWDER, LYOPHILIZED, FOR SOLUTION INTRAVENOUS at 18:37

## 2020-04-13 RX ADMIN — Medication 1: at 17:27

## 2020-04-13 RX ADMIN — MORPHINE SULFATE 1 MILLIGRAM(S): 50 CAPSULE, EXTENDED RELEASE ORAL at 19:10

## 2020-04-13 RX ADMIN — INSULIN GLARGINE 15 UNIT(S): 100 INJECTION, SOLUTION SUBCUTANEOUS at 22:47

## 2020-04-13 RX ADMIN — ALBUTEROL 1 PUFF(S): 90 AEROSOL, METERED ORAL at 07:29

## 2020-04-13 RX ADMIN — Medication 1: at 08:07

## 2020-04-13 RX ADMIN — ENOXAPARIN SODIUM 40 MILLIGRAM(S): 100 INJECTION SUBCUTANEOUS at 12:19

## 2020-04-13 RX ADMIN — ALBUTEROL 1 PUFF(S): 90 AEROSOL, METERED ORAL at 17:28

## 2020-04-13 RX ADMIN — CARVEDILOL PHOSPHATE 25 MILLIGRAM(S): 80 CAPSULE, EXTENDED RELEASE ORAL at 17:28

## 2020-04-13 RX ADMIN — Medication 40 MILLIGRAM(S): at 21:56

## 2020-04-13 RX ADMIN — Medication 20 MILLIGRAM(S): at 05:29

## 2020-04-13 RX ADMIN — ATORVASTATIN CALCIUM 20 MILLIGRAM(S): 80 TABLET, FILM COATED ORAL at 21:56

## 2020-04-13 RX ADMIN — Medication 40 MILLIGRAM(S): at 10:03

## 2020-04-13 RX ADMIN — ALBUTEROL 1 PUFF(S): 90 AEROSOL, METERED ORAL at 10:15

## 2020-04-13 RX ADMIN — ALBUTEROL 1 PUFF(S): 90 AEROSOL, METERED ORAL at 13:48

## 2020-04-13 RX ADMIN — Medication 81 MILLIGRAM(S): at 12:19

## 2020-04-13 RX ADMIN — Medication 3: at 12:20

## 2020-04-13 RX ADMIN — ALBUTEROL 1 PUFF(S): 90 AEROSOL, METERED ORAL at 22:00

## 2020-04-13 RX ADMIN — Medication 250 MILLIGRAM(S): at 19:10

## 2020-04-13 RX ADMIN — CARVEDILOL PHOSPHATE 25 MILLIGRAM(S): 80 CAPSULE, EXTENDED RELEASE ORAL at 05:29

## 2020-04-13 NOTE — PROGRESS NOTE ADULT - PROBLEM SELECTOR PLAN 1
-CXR with b/l opacities  - completed 6 days of hydroxychloroquine, inflammatory markers trending down   - c/w  Anakinra   - Patient still Hypoxic on 100 % NRB in prone position  -Will give Solumedrol 40mg IVP x 1 and Lasix 40 IVP x 1; Check stat CXR  Prognosis poor; Patient now DNR/DNI

## 2020-04-13 NOTE — PROGRESS NOTE ADULT - PROBLEM SELECTOR PLAN 4
A1c 9.3%  FS stable  c/w Lantus to 15 units qHS.  Continue with FS qac and qHS  Low dose ISS  Holding home oral diabetic medications

## 2020-04-13 NOTE — PROGRESS NOTE ADULT - PROBLEM SELECTOR PLAN 2
had a discussion with wife Anaid over the phone regarding advance directives. Patient Patient was previously DNI but not DNR. I explained to the wife that being DNI but not DNR is not logically because if a patient gets CPR he is intubated 100% of the time. Wife understood this and patient is now DNR/DNI.

## 2020-04-13 NOTE — CHART NOTE - NSCHARTNOTEFT_GEN_A_CORE
Pt's oxygen sat 64-69% on NRB 15L, RR 40s--> pt in prone position.   Discussed with Dr. Fernandez, will give additional dose IV lasix 40mg stat, continue IV solumedrol 40mg BID. Also rec giving 1 time dose vanc/zosyn for pneumonia coverage. IV morphine ordered prn for respiratory distress.    D-Dimer 4,119-- per discussion with med attending will start full dose Lovenox.  Called pharmacy [phone] recommend giving additional 40mg SQ Lovenox x1 dose stat (received 40mg at ~12pm), followed by started 80mg BID starting tomorrow am.     Repeat pulse ox on ear 85%     Provider called pt's wife Glenny [phone] and updated pt on medication condition- wife understands pt's prognosis guarded. All questions answered. Pt DNR/DNI. Will give sign out to night team

## 2020-04-13 NOTE — PROGRESS NOTE ADULT - SUBJECTIVE AND OBJECTIVE BOX
Patient is a 79y old  Male who presents with a chief complaint of covid r/o (12 Apr 2020 13:45)      SUBJECTIVE / OVERNIGHT EVENTS:  Patient SOB with O2 Sats in 70s on 100 %NRB.     MEDICATIONS  (STANDING):  ALBUTerol    90 MICROgram(s) HFA Inhaler 1 Puff(s) Inhalation every 4 hours  anakinra Injectable 100 milliGRAM(s) SubCutaneous daily  aspirin  chewable 81 milliGRAM(s) Oral daily  atorvastatin 20 milliGRAM(s) Oral at bedtime  carvedilol 25 milliGRAM(s) Oral every 12 hours  dextrose 5%. 1000 milliLiter(s) (50 mL/Hr) IV Continuous <Continuous>  dextrose 5%. 1000 milliLiter(s) (50 mL/Hr) IV Continuous <Continuous>  dextrose 50% Injectable 12.5 Gram(s) IV Push once  dextrose 50% Injectable 25 Gram(s) IV Push once  dextrose 50% Injectable 25 Gram(s) IV Push once  dextrose 50% Injectable 12.5 Gram(s) IV Push once  dextrose 50% Injectable 25 Gram(s) IV Push once  dextrose 50% Injectable 25 Gram(s) IV Push once  enoxaparin Injectable 40 milliGRAM(s) SubCutaneous daily  furosemide   Injectable 40 milliGRAM(s) IV Push once  insulin glargine Injectable (LANTUS) 15 Unit(s) SubCutaneous at bedtime  insulin lispro (HumaLOG) corrective regimen sliding scale   SubCutaneous three times a day before meals  methylPREDNISolone sodium succinate Injectable 20 milliGRAM(s) IV Push every 12 hours  methylPREDNISolone sodium succinate Injectable 40 milliGRAM(s) IV Push once    MEDICATIONS  (PRN):  acetaminophen   Tablet .. 650 milliGRAM(s) Oral every 4 hours PRN Temp greater or equal to 38.5C (101.3F)  benzocaine 20% Spray 1 Spray(s) Topical three times a day PRN sore throat  dextrose 40% Gel 15 Gram(s) Oral once PRN Blood Glucose LESS THAN 70 milliGRAM(s)/deciLiter  dextrose 40% Gel 15 Gram(s) Oral once PRN Blood Glucose LESS THAN 70 milliGRAM(s)/deciliter  glucagon  Injectable 1 milliGRAM(s) IntraMuscular once PRN Glucose <70 milliGRAM(s)/deciLiter  glucagon  Injectable 1 milliGRAM(s) IntraMuscular once PRN Glucose LESS THAN 70 milligrams/deciliter  morphine  - Injectable 1 milliGRAM(s) IV Push every 4 hours PRN Respiratory distress      Vital Signs Last 24 Hrs  T(C): 37.7 (13 Apr 2020 09:44), Max: 37.7 (13 Apr 2020 09:44)  T(F): 99.8 (13 Apr 2020 09:44), Max: 99.8 (13 Apr 2020 09:44)  HR: 81 (13 Apr 2020 09:44) (73 - 88)  BP: 135/61 (13 Apr 2020 09:44) (113/64 - 135/61)  BP(mean): --  RR: 35 (13 Apr 2020 09:44) (18 - 35)  SpO2: 78% (13 Apr 2020 09:44) (78% - 100%)  CAPILLARY BLOOD GLUCOSE      POCT Blood Glucose.: 139 mg/dL (13 Apr 2020 09:40)  POCT Blood Glucose.: 155 mg/dL (13 Apr 2020 07:42)  POCT Blood Glucose.: 102 mg/dL (12 Apr 2020 22:11)  POCT Blood Glucose.: 119 mg/dL (12 Apr 2020 17:52)  POCT Blood Glucose.: 126 mg/dL (12 Apr 2020 11:57)    I&O's Summary    12 Apr 2020 07:01  -  13 Apr 2020 07:00  --------------------------------------------------------  IN: 0 mL / OUT: 625 mL / NET: -625 mL        PHYSICAL EXAM:  GENERAL: NAD, well-developed  HEAD:  Atraumatic, Normocephalic  EYES: EOMI, PERRLA, conjunctiva and sclera clear  NECK: Supple, No JVD  CHEST/LUNG: decreased BS bilaterally; No wheeze  HEART: Regular rate and rhythm; No murmurs, rubs, or gallops  ABDOMEN: Soft, Nontender, Nondistended; Bowel sounds present  EXTREMITIES:  2+ Peripheral Pulses, No clubbing, cyanosis, or edema  PSYCH: AAOx3  NEUROLOGY: non-focal  SKIN: No rashes or lesions    LABS:                        15.6   8.27  )-----------( 212      ( 13 Apr 2020 06:11 )             48.2     04-12    136  |  96<L>  |  25<H>  ----------------------------<  101<H>  5.3   |  26  |  0.51    Ca    9.0      12 Apr 2020 18:56  Phos  2.7     04-12  Mg     2.0     04-12    TPro  7.1  /  Alb  2.1<L>  /  TBili  0.9  /  DBili  x   /  AST  46<H>  /  ALT  14  /  AlkPhos  84  04-12              RADIOLOGY & ADDITIONAL TESTS:    Imaging Personally Reviewed:    Consultant(s) Notes Reviewed:      Care Discussed with Consultants/Other Providers:

## 2020-04-13 NOTE — PROGRESS NOTE ADULT - ASSESSMENT
80 y/o male DNR/DNI w/ hx HTN, hyperlipidemia, CAD s/p CABG admitted for COVID19 with acute hypoxic respiratory failure and viral pneumonia.

## 2020-04-13 NOTE — RAPID RESPONSE TEAM SUMMARY - NSSITUATIONBACKGROUNDRRT_GEN_ALL_CORE
RRT was called for hypoxia. Patient was 78 y/o male DNR/DNI w/ hx HTN, hyperlipidemia, CAD s/p CABG admitted for COVID19 with acute hypoxic respiratory failure due to COVID PNA. Patient was already proned and started on NRB/NC (15-6). He was hypoxic to 72% without good wave form. After adjusting the pulse ox he was still hypoxic to 70s. His BP was stable at 133/68 and HR in the 80s. He was tachypneic and breathing heavily on NRB. Patient's family was called. After explaining to them that given his medical condition, current predicament, and advanced age, he will not benefit from aggressive measures. As per primary team, family verbalized their understanding and opted to make the patient DNI/DNR. Decision was made to give solumedrol and lasix. Also, 2 mg IV morphine was given for dyspnea.
80 y/o male pmh htn, hld, CAD s/p CABG c/o fever and cough x1 week. COVID 19 pneumonia with acute hypoxic respiratory failure. RRT called for hypoxia to mid-80s. Patient placed on NRB, proned, and saturations improved to 90-92%. Afebrile, hemodynamically stable.

## 2020-04-14 LAB
ALBUMIN SERPL ELPH-MCNC: 2.2 G/DL — LOW (ref 3.3–5)
ALP SERPL-CCNC: 79 U/L — SIGNIFICANT CHANGE UP (ref 40–120)
ALT FLD-CCNC: 11 U/L — SIGNIFICANT CHANGE UP (ref 4–41)
ANION GAP SERPL CALC-SCNC: 16 MMO/L — HIGH (ref 7–14)
AST SERPL-CCNC: 26 U/L — SIGNIFICANT CHANGE UP (ref 4–40)
BASOPHILS # BLD AUTO: 0.01 K/UL — SIGNIFICANT CHANGE UP (ref 0–0.2)
BASOPHILS NFR BLD AUTO: 0.1 % — SIGNIFICANT CHANGE UP (ref 0–2)
BILIRUB SERPL-MCNC: 0.6 MG/DL — SIGNIFICANT CHANGE UP (ref 0.2–1.2)
BUN SERPL-MCNC: 35 MG/DL — HIGH (ref 7–23)
CALCIUM SERPL-MCNC: 8.6 MG/DL — SIGNIFICANT CHANGE UP (ref 8.4–10.5)
CHLORIDE SERPL-SCNC: 95 MMOL/L — LOW (ref 98–107)
CO2 SERPL-SCNC: 29 MMOL/L — SIGNIFICANT CHANGE UP (ref 22–31)
CREAT SERPL-MCNC: 0.69 MG/DL — SIGNIFICANT CHANGE UP (ref 0.5–1.3)
EOSINOPHIL # BLD AUTO: 0 K/UL — SIGNIFICANT CHANGE UP (ref 0–0.5)
EOSINOPHIL NFR BLD AUTO: 0 % — SIGNIFICANT CHANGE UP (ref 0–6)
GLUCOSE BLDC GLUCOMTR-MCNC: 198 MG/DL — HIGH (ref 70–99)
GLUCOSE BLDC GLUCOMTR-MCNC: 229 MG/DL — HIGH (ref 70–99)
GLUCOSE BLDC GLUCOMTR-MCNC: 259 MG/DL — HIGH (ref 70–99)
GLUCOSE BLDC GLUCOMTR-MCNC: 268 MG/DL — HIGH (ref 70–99)
GLUCOSE SERPL-MCNC: 288 MG/DL — HIGH (ref 70–99)
HCT VFR BLD CALC: 46.3 % — SIGNIFICANT CHANGE UP (ref 39–50)
HGB BLD-MCNC: 15.3 G/DL — SIGNIFICANT CHANGE UP (ref 13–17)
IMM GRANULOCYTES NFR BLD AUTO: 0.6 % — SIGNIFICANT CHANGE UP (ref 0–1.5)
LYMPHOCYTES # BLD AUTO: 0.47 K/UL — LOW (ref 1–3.3)
LYMPHOCYTES # BLD AUTO: 4.5 % — LOW (ref 13–44)
MAGNESIUM SERPL-MCNC: 2.2 MG/DL — SIGNIFICANT CHANGE UP (ref 1.6–2.6)
MCHC RBC-ENTMCNC: 31.4 PG — SIGNIFICANT CHANGE UP (ref 27–34)
MCHC RBC-ENTMCNC: 33 % — SIGNIFICANT CHANGE UP (ref 32–36)
MCV RBC AUTO: 94.9 FL — SIGNIFICANT CHANGE UP (ref 80–100)
MONOCYTES # BLD AUTO: 0.3 K/UL — SIGNIFICANT CHANGE UP (ref 0–0.9)
MONOCYTES NFR BLD AUTO: 2.9 % — SIGNIFICANT CHANGE UP (ref 2–14)
NEUTROPHILS # BLD AUTO: 9.6 K/UL — HIGH (ref 1.8–7.4)
NEUTROPHILS NFR BLD AUTO: 91.9 % — HIGH (ref 43–77)
NRBC # FLD: 0 K/UL — SIGNIFICANT CHANGE UP (ref 0–0)
PHOSPHATE SERPL-MCNC: 3.9 MG/DL — SIGNIFICANT CHANGE UP (ref 2.5–4.5)
PLATELET # BLD AUTO: 192 K/UL — SIGNIFICANT CHANGE UP (ref 150–400)
PMV BLD: 11.1 FL — SIGNIFICANT CHANGE UP (ref 7–13)
POTASSIUM SERPL-MCNC: 4.6 MMOL/L — SIGNIFICANT CHANGE UP (ref 3.5–5.3)
POTASSIUM SERPL-SCNC: 4.6 MMOL/L — SIGNIFICANT CHANGE UP (ref 3.5–5.3)
PROT SERPL-MCNC: 6.8 G/DL — SIGNIFICANT CHANGE UP (ref 6–8.3)
RBC # BLD: 4.88 M/UL — SIGNIFICANT CHANGE UP (ref 4.2–5.8)
RBC # FLD: 12 % — SIGNIFICANT CHANGE UP (ref 10.3–14.5)
SODIUM SERPL-SCNC: 140 MMOL/L — SIGNIFICANT CHANGE UP (ref 135–145)
WBC # BLD: 10.44 K/UL — SIGNIFICANT CHANGE UP (ref 3.8–10.5)
WBC # FLD AUTO: 10.44 K/UL — SIGNIFICANT CHANGE UP (ref 3.8–10.5)

## 2020-04-14 PROCEDURE — 99233 SBSQ HOSP IP/OBS HIGH 50: CPT

## 2020-04-14 RX ORDER — PIPERACILLIN AND TAZOBACTAM 4; .5 G/20ML; G/20ML
3.38 INJECTION, POWDER, LYOPHILIZED, FOR SOLUTION INTRAVENOUS EVERY 8 HOURS
Refills: 0 | Status: COMPLETED | OUTPATIENT
Start: 2020-04-14 | End: 2020-04-21

## 2020-04-14 RX ADMIN — ENOXAPARIN SODIUM 80 MILLIGRAM(S): 100 INJECTION SUBCUTANEOUS at 05:41

## 2020-04-14 RX ADMIN — ATORVASTATIN CALCIUM 20 MILLIGRAM(S): 80 TABLET, FILM COATED ORAL at 22:11

## 2020-04-14 RX ADMIN — ALBUTEROL 1 PUFF(S): 90 AEROSOL, METERED ORAL at 23:15

## 2020-04-14 RX ADMIN — ALBUTEROL 1 PUFF(S): 90 AEROSOL, METERED ORAL at 11:05

## 2020-04-14 RX ADMIN — Medication 40 MILLIGRAM(S): at 00:52

## 2020-04-14 RX ADMIN — CARVEDILOL PHOSPHATE 25 MILLIGRAM(S): 80 CAPSULE, EXTENDED RELEASE ORAL at 05:41

## 2020-04-14 RX ADMIN — Medication 40 MILLIGRAM(S): at 13:24

## 2020-04-14 RX ADMIN — Medication 81 MILLIGRAM(S): at 11:05

## 2020-04-14 RX ADMIN — MORPHINE SULFATE 1 MILLIGRAM(S): 50 CAPSULE, EXTENDED RELEASE ORAL at 00:53

## 2020-04-14 RX ADMIN — ALBUTEROL 1 PUFF(S): 90 AEROSOL, METERED ORAL at 17:58

## 2020-04-14 RX ADMIN — PIPERACILLIN AND TAZOBACTAM 25 GRAM(S): 4; .5 INJECTION, POWDER, LYOPHILIZED, FOR SOLUTION INTRAVENOUS at 22:12

## 2020-04-14 RX ADMIN — ENOXAPARIN SODIUM 80 MILLIGRAM(S): 100 INJECTION SUBCUTANEOUS at 17:56

## 2020-04-14 RX ADMIN — Medication 100 MILLIGRAM(S): at 13:08

## 2020-04-14 RX ADMIN — INSULIN GLARGINE 15 UNIT(S): 100 INJECTION, SOLUTION SUBCUTANEOUS at 22:11

## 2020-04-14 RX ADMIN — Medication 3: at 17:57

## 2020-04-14 RX ADMIN — Medication 1: at 13:09

## 2020-04-14 RX ADMIN — ALBUTEROL 1 PUFF(S): 90 AEROSOL, METERED ORAL at 15:35

## 2020-04-14 RX ADMIN — PIPERACILLIN AND TAZOBACTAM 25 GRAM(S): 4; .5 INJECTION, POWDER, LYOPHILIZED, FOR SOLUTION INTRAVENOUS at 13:10

## 2020-04-14 RX ADMIN — Medication 3: at 08:11

## 2020-04-14 RX ADMIN — ALBUTEROL 1 PUFF(S): 90 AEROSOL, METERED ORAL at 05:41

## 2020-04-14 RX ADMIN — CARVEDILOL PHOSPHATE 25 MILLIGRAM(S): 80 CAPSULE, EXTENDED RELEASE ORAL at 17:57

## 2020-04-14 NOTE — PROGRESS NOTE ADULT - SUBJECTIVE AND OBJECTIVE BOX
Patient is a 79y old  Male who presents with a chief complaint of covid r/o (13 Apr 2020 09:47)      SUBJECTIVE / OVERNIGHT EVENTS:  Patient awake, alert, and speaking in complete sentences. Denies cp, SOB, abdominal pain, N/V/D     MEDICATIONS  (STANDING):  ALBUTerol    90 MICROgram(s) HFA Inhaler 1 Puff(s) Inhalation every 4 hours  aspirin  chewable 81 milliGRAM(s) Oral daily  atorvastatin 20 milliGRAM(s) Oral at bedtime  carvedilol 25 milliGRAM(s) Oral every 12 hours  dextrose 5%. 1000 milliLiter(s) (50 mL/Hr) IV Continuous <Continuous>  dextrose 5%. 1000 milliLiter(s) (50 mL/Hr) IV Continuous <Continuous>  dextrose 50% Injectable 12.5 Gram(s) IV Push once  dextrose 50% Injectable 25 Gram(s) IV Push once  dextrose 50% Injectable 25 Gram(s) IV Push once  dextrose 50% Injectable 12.5 Gram(s) IV Push once  dextrose 50% Injectable 25 Gram(s) IV Push once  dextrose 50% Injectable 25 Gram(s) IV Push once  enoxaparin Injectable 80 milliGRAM(s) SubCutaneous two times a day  insulin glargine Injectable (LANTUS) 15 Unit(s) SubCutaneous at bedtime  insulin lispro (HumaLOG) corrective regimen sliding scale   SubCutaneous three times a day before meals  methylPREDNISolone sodium succinate Injectable 40 milliGRAM(s) IV Push every 12 hours  piperacillin/tazobactam IVPB.. 3.375 Gram(s) IV Intermittent every 8 hours    MEDICATIONS  (PRN):  acetaminophen   Tablet .. 650 milliGRAM(s) Oral every 4 hours PRN Temp greater or equal to 38.5C (101.3F)  benzocaine 20% Spray 1 Spray(s) Topical three times a day PRN sore throat  dextrose 40% Gel 15 Gram(s) Oral once PRN Blood Glucose LESS THAN 70 milliGRAM(s)/deciLiter  dextrose 40% Gel 15 Gram(s) Oral once PRN Blood Glucose LESS THAN 70 milliGRAM(s)/deciliter  glucagon  Injectable 1 milliGRAM(s) IntraMuscular once PRN Glucose <70 milliGRAM(s)/deciLiter  glucagon  Injectable 1 milliGRAM(s) IntraMuscular once PRN Glucose LESS THAN 70 milligrams/deciliter  morphine  - Injectable 1 milliGRAM(s) IV Push every 4 hours PRN Respiratory distress      Vital Signs Last 24 Hrs  T(C): 36.3 (13 Apr 2020 22:04), Max: 36.3 (13 Apr 2020 22:04)  T(F): 97.4 (13 Apr 2020 22:04), Max: 97.4 (13 Apr 2020 22:04)  HR: 84 (14 Apr 2020 05:39) (72 - 84)  BP: 112/46 (14 Apr 2020 05:39) (100/50 - 128/58)  BP(mean): --  RR: 30 (14 Apr 2020 01:52) (28 - 48)  SpO2: 91% (14 Apr 2020 05:39) (64% - 93%)  CAPILLARY BLOOD GLUCOSE      POCT Blood Glucose.: 198 mg/dL (14 Apr 2020 12:31)  POCT Blood Glucose.: 268 mg/dL (14 Apr 2020 08:04)  POCT Blood Glucose.: 262 mg/dL (13 Apr 2020 22:20)  POCT Blood Glucose.: 183 mg/dL (13 Apr 2020 17:00)    I&O's Summary    13 Apr 2020 07:01  -  14 Apr 2020 07:00  --------------------------------------------------------  IN: 0 mL / OUT: 2125 mL / NET: -2125 mL        PHYSICAL EXAM:  GENERAL: NAD, well-developed  HEAD:  Atraumatic, Normocephalic  EYES: EOMI, PERRLA, conjunctiva and sclera clear  NECK: Supple, No JVD  CHEST/LUNG: Clear to auscultation bilaterally; No wheeze  HEART: Regular rate and rhythm; No murmurs, rubs, or gallops  ABDOMEN: Soft, Nontender, Nondistended; Bowel sounds present  EXTREMITIES:  2+ Peripheral Pulses, No clubbing, cyanosis, or edema  PSYCH: AAOx3  NEUROLOGY: non-focal  SKIN: No rashes or lesions    LABS:                        15.3   10.44 )-----------( 192      ( 14 Apr 2020 07:51 )             46.3     04-14    140  |  95<L>  |  35<H>  ----------------------------<  288<H>  4.6   |  29  |  0.69    Ca    8.6      14 Apr 2020 07:50  Phos  3.9     04-14  Mg     2.2     04-14    TPro  6.8  /  Alb  2.2<L>  /  TBili  0.6  /  DBili  x   /  AST  26  /  ALT  11  /  AlkPhos  79  04-14              RADIOLOGY & ADDITIONAL TESTS:    Imaging Personally Reviewed: < from: Xray Chest 1 View-PORTABLE IMMEDIATE (04.13.20 @ 10:19) >  IMPRESSION:     Worsening pulmonary opacity.    < end of copied text >      Consultant(s) Notes Reviewed:      Care Discussed with Consultants/Other Providers:

## 2020-04-14 NOTE — PROGRESS NOTE ADULT - PROBLEM SELECTOR PLAN 3
C/w anakinra  Completed 5 days of Plaquenil  s/p Solumedrol x 5 days   restarted Solumedrol 40mg IV q12h and abx with marked improvement clinically  c/w zosyn/Vanco  c/w anakinra

## 2020-04-14 NOTE — PROGRESS NOTE ADULT - ASSESSMENT
78 y/o male DNR/DNI w/ hx HTN, hyperlipidemia, CAD s/p CABG admitted for COVID19 with acute hypoxic respiratory failure and viral pneumonia.

## 2020-04-14 NOTE — PROGRESS NOTE ADULT - PROBLEM SELECTOR PLAN 1
marked clinical improvement after lasix , solumedrol, therapeutic Lovenox, and abx given yesterday.  -CXR with b/l opacities  - completed 6 days of hydroxychloroquine, inflammatory markers trending down   - c/w  Anakinra   c/w IV solumedrol, Zosyn/vanco  c/w therapuetic Lovenox since may have a PE; will check CTPA when respirtory status further improves.   - Patient now DNR/DNI

## 2020-04-15 LAB
ALBUMIN SERPL ELPH-MCNC: 2.2 G/DL — LOW (ref 3.3–5)
ALP SERPL-CCNC: 87 U/L — SIGNIFICANT CHANGE UP (ref 40–120)
ALT FLD-CCNC: 12 U/L — SIGNIFICANT CHANGE UP (ref 4–41)
ANION GAP SERPL CALC-SCNC: 16 MMO/L — HIGH (ref 7–14)
AST SERPL-CCNC: 29 U/L — SIGNIFICANT CHANGE UP (ref 4–40)
BASOPHILS # BLD AUTO: 0.02 K/UL — SIGNIFICANT CHANGE UP (ref 0–0.2)
BASOPHILS NFR BLD AUTO: 0.2 % — SIGNIFICANT CHANGE UP (ref 0–2)
BILIRUB SERPL-MCNC: 0.7 MG/DL — SIGNIFICANT CHANGE UP (ref 0.2–1.2)
BUN SERPL-MCNC: 35 MG/DL — HIGH (ref 7–23)
CALCIUM SERPL-MCNC: 8.9 MG/DL — SIGNIFICANT CHANGE UP (ref 8.4–10.5)
CHLORIDE SERPL-SCNC: 98 MMOL/L — SIGNIFICANT CHANGE UP (ref 98–107)
CO2 SERPL-SCNC: 24 MMOL/L — SIGNIFICANT CHANGE UP (ref 22–31)
CREAT SERPL-MCNC: 0.63 MG/DL — SIGNIFICANT CHANGE UP (ref 0.5–1.3)
EOSINOPHIL # BLD AUTO: 0 K/UL — SIGNIFICANT CHANGE UP (ref 0–0.5)
EOSINOPHIL NFR BLD AUTO: 0 % — SIGNIFICANT CHANGE UP (ref 0–6)
GLUCOSE BLDC GLUCOMTR-MCNC: 148 MG/DL — HIGH (ref 70–99)
GLUCOSE BLDC GLUCOMTR-MCNC: 180 MG/DL — HIGH (ref 70–99)
GLUCOSE BLDC GLUCOMTR-MCNC: 198 MG/DL — HIGH (ref 70–99)
GLUCOSE BLDC GLUCOMTR-MCNC: 234 MG/DL — HIGH (ref 70–99)
GLUCOSE SERPL-MCNC: 168 MG/DL — HIGH (ref 70–99)
HCT VFR BLD CALC: 50.5 % — HIGH (ref 39–50)
HGB BLD-MCNC: 16.7 G/DL — SIGNIFICANT CHANGE UP (ref 13–17)
IMM GRANULOCYTES NFR BLD AUTO: 0.5 % — SIGNIFICANT CHANGE UP (ref 0–1.5)
LYMPHOCYTES # BLD AUTO: 0.58 K/UL — LOW (ref 1–3.3)
LYMPHOCYTES # BLD AUTO: 4.7 % — LOW (ref 13–44)
MAGNESIUM SERPL-MCNC: 2.3 MG/DL — SIGNIFICANT CHANGE UP (ref 1.6–2.6)
MCHC RBC-ENTMCNC: 31.2 PG — SIGNIFICANT CHANGE UP (ref 27–34)
MCHC RBC-ENTMCNC: 33.1 % — SIGNIFICANT CHANGE UP (ref 32–36)
MCV RBC AUTO: 94.2 FL — SIGNIFICANT CHANGE UP (ref 80–100)
MONOCYTES # BLD AUTO: 0.24 K/UL — SIGNIFICANT CHANGE UP (ref 0–0.9)
MONOCYTES NFR BLD AUTO: 1.9 % — LOW (ref 2–14)
NEUTROPHILS # BLD AUTO: 11.44 K/UL — HIGH (ref 1.8–7.4)
NEUTROPHILS NFR BLD AUTO: 92.7 % — HIGH (ref 43–77)
NRBC # FLD: 0 K/UL — SIGNIFICANT CHANGE UP (ref 0–0)
PHOSPHATE SERPL-MCNC: 2.8 MG/DL — SIGNIFICANT CHANGE UP (ref 2.5–4.5)
PLATELET # BLD AUTO: 141 K/UL — LOW (ref 150–400)
PMV BLD: 11.3 FL — SIGNIFICANT CHANGE UP (ref 7–13)
POTASSIUM SERPL-MCNC: 5 MMOL/L — SIGNIFICANT CHANGE UP (ref 3.5–5.3)
POTASSIUM SERPL-SCNC: 5 MMOL/L — SIGNIFICANT CHANGE UP (ref 3.5–5.3)
PROT SERPL-MCNC: 6.9 G/DL — SIGNIFICANT CHANGE UP (ref 6–8.3)
RBC # BLD: 5.36 M/UL — SIGNIFICANT CHANGE UP (ref 4.2–5.8)
RBC # FLD: 12 % — SIGNIFICANT CHANGE UP (ref 10.3–14.5)
SODIUM SERPL-SCNC: 138 MMOL/L — SIGNIFICANT CHANGE UP (ref 135–145)
WBC # BLD: 12.34 K/UL — HIGH (ref 3.8–10.5)
WBC # FLD AUTO: 12.34 K/UL — HIGH (ref 3.8–10.5)

## 2020-04-15 PROCEDURE — 99233 SBSQ HOSP IP/OBS HIGH 50: CPT

## 2020-04-15 RX ORDER — VANCOMYCIN HCL 1 G
1000 VIAL (EA) INTRAVENOUS DAILY
Refills: 0 | Status: DISCONTINUED | OUTPATIENT
Start: 2020-04-15 | End: 2020-04-15

## 2020-04-15 RX ORDER — VANCOMYCIN HCL 1 G
1000 VIAL (EA) INTRAVENOUS EVERY 12 HOURS
Refills: 0 | Status: DISCONTINUED | OUTPATIENT
Start: 2020-04-15 | End: 2020-04-19

## 2020-04-15 RX ADMIN — PIPERACILLIN AND TAZOBACTAM 25 GRAM(S): 4; .5 INJECTION, POWDER, LYOPHILIZED, FOR SOLUTION INTRAVENOUS at 06:27

## 2020-04-15 RX ADMIN — PIPERACILLIN AND TAZOBACTAM 25 GRAM(S): 4; .5 INJECTION, POWDER, LYOPHILIZED, FOR SOLUTION INTRAVENOUS at 22:23

## 2020-04-15 RX ADMIN — CARVEDILOL PHOSPHATE 25 MILLIGRAM(S): 80 CAPSULE, EXTENDED RELEASE ORAL at 18:57

## 2020-04-15 RX ADMIN — ENOXAPARIN SODIUM 80 MILLIGRAM(S): 100 INJECTION SUBCUTANEOUS at 06:26

## 2020-04-15 RX ADMIN — PIPERACILLIN AND TAZOBACTAM 25 GRAM(S): 4; .5 INJECTION, POWDER, LYOPHILIZED, FOR SOLUTION INTRAVENOUS at 15:23

## 2020-04-15 RX ADMIN — ALBUTEROL 1 PUFF(S): 90 AEROSOL, METERED ORAL at 12:42

## 2020-04-15 RX ADMIN — Medication 81 MILLIGRAM(S): at 12:43

## 2020-04-15 RX ADMIN — Medication 40 MILLIGRAM(S): at 01:57

## 2020-04-15 RX ADMIN — Medication 1: at 07:59

## 2020-04-15 RX ADMIN — ENOXAPARIN SODIUM 80 MILLIGRAM(S): 100 INJECTION SUBCUTANEOUS at 18:56

## 2020-04-15 RX ADMIN — ALBUTEROL 1 PUFF(S): 90 AEROSOL, METERED ORAL at 03:50

## 2020-04-15 RX ADMIN — ALBUTEROL 1 PUFF(S): 90 AEROSOL, METERED ORAL at 18:53

## 2020-04-15 RX ADMIN — Medication 40 MILLIGRAM(S): at 15:23

## 2020-04-15 RX ADMIN — ATORVASTATIN CALCIUM 20 MILLIGRAM(S): 80 TABLET, FILM COATED ORAL at 22:26

## 2020-04-15 RX ADMIN — ALBUTEROL 1 PUFF(S): 90 AEROSOL, METERED ORAL at 22:27

## 2020-04-15 RX ADMIN — ALBUTEROL 1 PUFF(S): 90 AEROSOL, METERED ORAL at 15:31

## 2020-04-15 RX ADMIN — Medication 1: at 12:44

## 2020-04-15 RX ADMIN — Medication 250 MILLIGRAM(S): at 18:54

## 2020-04-15 RX ADMIN — CARVEDILOL PHOSPHATE 25 MILLIGRAM(S): 80 CAPSULE, EXTENDED RELEASE ORAL at 06:26

## 2020-04-15 RX ADMIN — INSULIN GLARGINE 15 UNIT(S): 100 INJECTION, SOLUTION SUBCUTANEOUS at 22:39

## 2020-04-15 RX ADMIN — ALBUTEROL 1 PUFF(S): 90 AEROSOL, METERED ORAL at 06:27

## 2020-04-15 NOTE — PROGRESS NOTE ADULT - PROBLEM SELECTOR PLAN 2
C/w anakinra  Completed 5 days of Plaquenil  s/p Solumedrol x 5 days   restarted Solumedrol 40mg IV q12h and abx with marked improvement clinically on 4/13  c/w zosyn/Vanco  c/w anakinra

## 2020-04-15 NOTE — PROGRESS NOTE ADULT - SUBJECTIVE AND OBJECTIVE BOX
Patient is a 79y old  Male who presents with a chief complaint of covid r/o (14 Apr 2020 13:12)      SUBJECTIVE / OVERNIGHT EVENTS:  Patient feels well. Patient has no new complaints. Still with mild SOB.  Denies cp, abdominal pain, N/V/D     MEDICATIONS  (STANDING):  ALBUTerol    90 MICROgram(s) HFA Inhaler 1 Puff(s) Inhalation every 4 hours  aspirin  chewable 81 milliGRAM(s) Oral daily  atorvastatin 20 milliGRAM(s) Oral at bedtime  carvedilol 25 milliGRAM(s) Oral every 12 hours  dextrose 5%. 1000 milliLiter(s) (50 mL/Hr) IV Continuous <Continuous>  dextrose 5%. 1000 milliLiter(s) (50 mL/Hr) IV Continuous <Continuous>  dextrose 50% Injectable 12.5 Gram(s) IV Push once  dextrose 50% Injectable 25 Gram(s) IV Push once  dextrose 50% Injectable 25 Gram(s) IV Push once  dextrose 50% Injectable 12.5 Gram(s) IV Push once  dextrose 50% Injectable 25 Gram(s) IV Push once  dextrose 50% Injectable 25 Gram(s) IV Push once  enoxaparin Injectable 80 milliGRAM(s) SubCutaneous two times a day  insulin glargine Injectable (LANTUS) 15 Unit(s) SubCutaneous at bedtime  insulin lispro (HumaLOG) corrective regimen sliding scale   SubCutaneous three times a day before meals  methylPREDNISolone sodium succinate Injectable 40 milliGRAM(s) IV Push every 12 hours  piperacillin/tazobactam IVPB.. 3.375 Gram(s) IV Intermittent every 8 hours    MEDICATIONS  (PRN):  acetaminophen   Tablet .. 650 milliGRAM(s) Oral every 4 hours PRN Temp greater or equal to 38.5C (101.3F)  benzocaine 20% Spray 1 Spray(s) Topical three times a day PRN sore throat  dextrose 40% Gel 15 Gram(s) Oral once PRN Blood Glucose LESS THAN 70 milliGRAM(s)/deciLiter  dextrose 40% Gel 15 Gram(s) Oral once PRN Blood Glucose LESS THAN 70 milliGRAM(s)/deciliter  glucagon  Injectable 1 milliGRAM(s) IntraMuscular once PRN Glucose <70 milliGRAM(s)/deciLiter  glucagon  Injectable 1 milliGRAM(s) IntraMuscular once PRN Glucose LESS THAN 70 milligrams/deciliter  morphine  - Injectable 1 milliGRAM(s) IV Push every 4 hours PRN Respiratory distress      Vital Signs Last 24 Hrs  T(C): 36.3 (15 Apr 2020 09:02), Max: 36.7 (14 Apr 2020 17:00)  T(F): 97.4 (15 Apr 2020 09:02), Max: 98 (14 Apr 2020 17:00)  HR: 79 (15 Apr 2020 09:02) (73 - 81)  BP: 107/46 (15 Apr 2020 09:02) (104/74 - 119/69)  BP(mean): 60 (15 Apr 2020 09:02) (60 - 60)  RR: 25 (15 Apr 2020 09:02) (24 - 27)  SpO2: 97% (15 Apr 2020 09:02) (94% - 97%)  CAPILLARY BLOOD GLUCOSE      POCT Blood Glucose.: 180 mg/dL (15 Apr 2020 07:47)  POCT Blood Glucose.: 229 mg/dL (14 Apr 2020 21:36)  POCT Blood Glucose.: 259 mg/dL (14 Apr 2020 17:26)  POCT Blood Glucose.: 198 mg/dL (14 Apr 2020 12:31)    I&O's Summary      PHYSICAL EXAM:  GENERAL: NAD, well-developed  HEAD:  Atraumatic, Normocephalic  EYES: EOMI, PERRLA, conjunctiva and sclera clear  NECK: Supple, No JVD  CHEST/LUNG: Clear to auscultation bilaterally; No wheeze  HEART: Regular rate and rhythm; No murmurs, rubs, or gallops  ABDOMEN: Soft, Nontender, Nondistended; Bowel sounds present  EXTREMITIES:  2+ Peripheral Pulses, No clubbing, cyanosis, or edema  PSYCH: AAOx3  NEUROLOGY: non-focal  SKIN: No rashes or lesions    LABS:                        16.7   12.34 )-----------( 141      ( 15 Apr 2020 07:12 )             50.5     04-15    138  |  98  |  35<H>  ----------------------------<  168<H>  5.0   |  24  |  0.63    Ca    8.9      15 Apr 2020 07:12  Phos  2.8     04-15  Mg     2.3     04-15    TPro  6.9  /  Alb  2.2<L>  /  TBili  0.7  /  DBili  x   /  AST  29  /  ALT  12  /  AlkPhos  87  04-15              RADIOLOGY & ADDITIONAL TESTS:    Imaging Personally Reviewed:    Consultant(s) Notes Reviewed:      Care Discussed with Consultants/Other Providers:

## 2020-04-15 NOTE — PROGRESS NOTE ADULT - PROBLEM SELECTOR PLAN 1
marked clinical improvement after lasix , solumedrol, therapeutic Lovenox, and abx given 4/13.  -CXR with b/l opacities  - completed 6 days of hydroxychloroquine, inflammatory markers trending down   - c/w  Anakinra   c/w IV solumedrol, Zosyn/vanco  c/w therapeutic Lovenox since may have a PE; will check CTPA when respiratory status further improves.   - Patient now DNR/DNI

## 2020-04-16 LAB
BASOPHILS # BLD AUTO: 0.02 K/UL — SIGNIFICANT CHANGE UP (ref 0–0.2)
BASOPHILS NFR BLD AUTO: 0.2 % — SIGNIFICANT CHANGE UP (ref 0–2)
CRP SERPL-MCNC: 20.9 MG/L — HIGH
D DIMER BLD IA.RAPID-MCNC: 1975 NG/ML — SIGNIFICANT CHANGE UP
EOSINOPHIL # BLD AUTO: 0.02 K/UL — SIGNIFICANT CHANGE UP (ref 0–0.5)
EOSINOPHIL NFR BLD AUTO: 0.2 % — SIGNIFICANT CHANGE UP (ref 0–6)
FERRITIN SERPL-MCNC: 1268 NG/ML — HIGH (ref 30–400)
GLUCOSE BLDC GLUCOMTR-MCNC: 205 MG/DL — HIGH (ref 70–99)
GLUCOSE BLDC GLUCOMTR-MCNC: 206 MG/DL — HIGH (ref 70–99)
GLUCOSE BLDC GLUCOMTR-MCNC: 220 MG/DL — HIGH (ref 70–99)
GLUCOSE BLDC GLUCOMTR-MCNC: 227 MG/DL — HIGH (ref 70–99)
HCT VFR BLD CALC: 50.5 % — HIGH (ref 39–50)
HGB BLD-MCNC: 16.2 G/DL — SIGNIFICANT CHANGE UP (ref 13–17)
IMM GRANULOCYTES NFR BLD AUTO: 0.7 % — SIGNIFICANT CHANGE UP (ref 0–1.5)
LDH SERPL L TO P-CCNC: 548 U/L — HIGH (ref 135–225)
LYMPHOCYTES # BLD AUTO: 0.57 K/UL — LOW (ref 1–3.3)
LYMPHOCYTES # BLD AUTO: 4.7 % — LOW (ref 13–44)
MCHC RBC-ENTMCNC: 30.6 PG — SIGNIFICANT CHANGE UP (ref 27–34)
MCHC RBC-ENTMCNC: 32.1 % — SIGNIFICANT CHANGE UP (ref 32–36)
MCV RBC AUTO: 95.3 FL — SIGNIFICANT CHANGE UP (ref 80–100)
MONOCYTES # BLD AUTO: 0.23 K/UL — SIGNIFICANT CHANGE UP (ref 0–0.9)
MONOCYTES NFR BLD AUTO: 1.9 % — LOW (ref 2–14)
NEUTROPHILS # BLD AUTO: 11.18 K/UL — HIGH (ref 1.8–7.4)
NEUTROPHILS NFR BLD AUTO: 92.3 % — HIGH (ref 43–77)
NRBC # FLD: 0 K/UL — SIGNIFICANT CHANGE UP (ref 0–0)
PLATELET # BLD AUTO: 159 K/UL — SIGNIFICANT CHANGE UP (ref 150–400)
PMV BLD: 11.3 FL — SIGNIFICANT CHANGE UP (ref 7–13)
PROCALCITONIN SERPL-MCNC: 0.05 NG/ML — SIGNIFICANT CHANGE UP (ref 0.02–0.1)
RBC # BLD: 5.3 M/UL — SIGNIFICANT CHANGE UP (ref 4.2–5.8)
RBC # FLD: 12 % — SIGNIFICANT CHANGE UP (ref 10.3–14.5)
WBC # BLD: 12.11 K/UL — HIGH (ref 3.8–10.5)
WBC # FLD AUTO: 12.11 K/UL — HIGH (ref 3.8–10.5)

## 2020-04-16 PROCEDURE — 99233 SBSQ HOSP IP/OBS HIGH 50: CPT

## 2020-04-16 PROCEDURE — 71045 X-RAY EXAM CHEST 1 VIEW: CPT | Mod: 26

## 2020-04-16 RX ORDER — FUROSEMIDE 40 MG
40 TABLET ORAL EVERY 12 HOURS
Refills: 0 | Status: DISCONTINUED | OUTPATIENT
Start: 2020-04-16 | End: 2020-04-21

## 2020-04-16 RX ORDER — FUROSEMIDE 40 MG
20 TABLET ORAL ONCE
Refills: 0 | Status: COMPLETED | OUTPATIENT
Start: 2020-04-16 | End: 2020-04-16

## 2020-04-16 RX ADMIN — ALBUTEROL 1 PUFF(S): 90 AEROSOL, METERED ORAL at 10:00

## 2020-04-16 RX ADMIN — Medication 2: at 08:33

## 2020-04-16 RX ADMIN — CARVEDILOL PHOSPHATE 25 MILLIGRAM(S): 80 CAPSULE, EXTENDED RELEASE ORAL at 05:35

## 2020-04-16 RX ADMIN — INSULIN GLARGINE 15 UNIT(S): 100 INJECTION, SOLUTION SUBCUTANEOUS at 22:31

## 2020-04-16 RX ADMIN — PIPERACILLIN AND TAZOBACTAM 25 GRAM(S): 4; .5 INJECTION, POWDER, LYOPHILIZED, FOR SOLUTION INTRAVENOUS at 21:44

## 2020-04-16 RX ADMIN — ALBUTEROL 1 PUFF(S): 90 AEROSOL, METERED ORAL at 13:47

## 2020-04-16 RX ADMIN — Medication 2: at 12:37

## 2020-04-16 RX ADMIN — PIPERACILLIN AND TAZOBACTAM 25 GRAM(S): 4; .5 INJECTION, POWDER, LYOPHILIZED, FOR SOLUTION INTRAVENOUS at 07:04

## 2020-04-16 RX ADMIN — Medication 40 MILLIGRAM(S): at 13:49

## 2020-04-16 RX ADMIN — ENOXAPARIN SODIUM 80 MILLIGRAM(S): 100 INJECTION SUBCUTANEOUS at 17:27

## 2020-04-16 RX ADMIN — CARVEDILOL PHOSPHATE 25 MILLIGRAM(S): 80 CAPSULE, EXTENDED RELEASE ORAL at 17:28

## 2020-04-16 RX ADMIN — Medication 81 MILLIGRAM(S): at 10:00

## 2020-04-16 RX ADMIN — PIPERACILLIN AND TAZOBACTAM 25 GRAM(S): 4; .5 INJECTION, POWDER, LYOPHILIZED, FOR SOLUTION INTRAVENOUS at 13:46

## 2020-04-16 RX ADMIN — ENOXAPARIN SODIUM 80 MILLIGRAM(S): 100 INJECTION SUBCUTANEOUS at 05:35

## 2020-04-16 RX ADMIN — ALBUTEROL 1 PUFF(S): 90 AEROSOL, METERED ORAL at 17:30

## 2020-04-16 RX ADMIN — Medication 40 MILLIGRAM(S): at 03:05

## 2020-04-16 RX ADMIN — Medication 250 MILLIGRAM(S): at 17:28

## 2020-04-16 RX ADMIN — Medication 2: at 17:27

## 2020-04-16 RX ADMIN — ALBUTEROL 1 PUFF(S): 90 AEROSOL, METERED ORAL at 21:43

## 2020-04-16 RX ADMIN — ALBUTEROL 1 PUFF(S): 90 AEROSOL, METERED ORAL at 07:05

## 2020-04-16 RX ADMIN — ALBUTEROL 1 PUFF(S): 90 AEROSOL, METERED ORAL at 03:05

## 2020-04-16 RX ADMIN — Medication 20 MILLIGRAM(S): at 13:55

## 2020-04-16 RX ADMIN — ATORVASTATIN CALCIUM 20 MILLIGRAM(S): 80 TABLET, FILM COATED ORAL at 21:43

## 2020-04-16 RX ADMIN — Medication 40 MILLIGRAM(S): at 21:43

## 2020-04-16 RX ADMIN — Medication 250 MILLIGRAM(S): at 05:10

## 2020-04-16 NOTE — PROGRESS NOTE ADULT - PROBLEM SELECTOR PLAN 2
C/w anakinra  Completed 5 days of Plaquenil  s/p Solumedrol x 5 days   restarted Solumedrol 40mg IV q12h and abx with marked improvement clinically on 4/13  c/w zosyn/Vanco  c/w anakinra C/w anakinra  Completed 5 days of Plaquenil  s/p Solumedrol   c/w zosyn/Vanco  c/w anakinra

## 2020-04-16 NOTE — PROGRESS NOTE ADULT - SUBJECTIVE AND OBJECTIVE BOX
Patient is a 79y old  Male who presents with a chief complaint of covid r/o (15 Apr 2020 10:54)      SUBJECTIVE / OVERNIGHT EVENTS:    MEDICATIONS  (STANDING):  ALBUTerol    90 MICROgram(s) HFA Inhaler 1 Puff(s) Inhalation every 4 hours  aspirin  chewable 81 milliGRAM(s) Oral daily  atorvastatin 20 milliGRAM(s) Oral at bedtime  carvedilol 25 milliGRAM(s) Oral every 12 hours  dextrose 5%. 1000 milliLiter(s) (50 mL/Hr) IV Continuous <Continuous>  dextrose 5%. 1000 milliLiter(s) (50 mL/Hr) IV Continuous <Continuous>  dextrose 50% Injectable 12.5 Gram(s) IV Push once  dextrose 50% Injectable 25 Gram(s) IV Push once  dextrose 50% Injectable 25 Gram(s) IV Push once  dextrose 50% Injectable 12.5 Gram(s) IV Push once  dextrose 50% Injectable 25 Gram(s) IV Push once  dextrose 50% Injectable 25 Gram(s) IV Push once  enoxaparin Injectable 80 milliGRAM(s) SubCutaneous two times a day  insulin glargine Injectable (LANTUS) 15 Unit(s) SubCutaneous at bedtime  insulin lispro (HumaLOG) corrective regimen sliding scale   SubCutaneous three times a day before meals  methylPREDNISolone sodium succinate Injectable 40 milliGRAM(s) IV Push every 12 hours  piperacillin/tazobactam IVPB.. 3.375 Gram(s) IV Intermittent every 8 hours  vancomycin  IVPB 1000 milliGRAM(s) IV Intermittent every 12 hours    MEDICATIONS  (PRN):  acetaminophen   Tablet .. 650 milliGRAM(s) Oral every 4 hours PRN Temp greater or equal to 38.5C (101.3F)  benzocaine 20% Spray 1 Spray(s) Topical three times a day PRN sore throat  dextrose 40% Gel 15 Gram(s) Oral once PRN Blood Glucose LESS THAN 70 milliGRAM(s)/deciLiter  dextrose 40% Gel 15 Gram(s) Oral once PRN Blood Glucose LESS THAN 70 milliGRAM(s)/deciliter  glucagon  Injectable 1 milliGRAM(s) IntraMuscular once PRN Glucose <70 milliGRAM(s)/deciLiter  glucagon  Injectable 1 milliGRAM(s) IntraMuscular once PRN Glucose LESS THAN 70 milligrams/deciliter  morphine  - Injectable 1 milliGRAM(s) IV Push every 4 hours PRN Respiratory distress      Vital Signs Last 24 Hrs  T(C): 36.8 (16 Apr 2020 09:57), Max: 36.8 (16 Apr 2020 09:57)  T(F): 98.3 (16 Apr 2020 09:57), Max: 98.3 (16 Apr 2020 09:57)  HR: 73 (16 Apr 2020 09:57) (63 - 73)  BP: 125/73 (16 Apr 2020 09:57) (123/50 - 142/60)  BP(mean): --  RR: 30 (16 Apr 2020 10:12) (22 - 48)  SpO2: 100% (16 Apr 2020 10:12) (72% - 100%)  CAPILLARY BLOOD GLUCOSE      POCT Blood Glucose.: 205 mg/dL (16 Apr 2020 07:22)  POCT Blood Glucose.: 234 mg/dL (15 Apr 2020 22:33)  POCT Blood Glucose.: 148 mg/dL (15 Apr 2020 16:39)  POCT Blood Glucose.: 198 mg/dL (15 Apr 2020 12:11)    I&O's Summary    15 Apr 2020 07:01  -  16 Apr 2020 07:00  --------------------------------------------------------  IN: 0 mL / OUT: 200 mL / NET: -200 mL        PHYSICAL EXAM:  GENERAL: NAD, well-developed  HEAD:  Atraumatic, Normocephalic  EYES: EOMI, PERRLA, conjunctiva and sclera clear  NECK: Supple, No JVD  CHEST/LUNG: Clear to auscultation bilaterally; No wheeze  HEART: Regular rate and rhythm; No murmurs, rubs, or gallops  ABDOMEN: Soft, Nontender, Nondistended; Bowel sounds present  EXTREMITIES:  2+ Peripheral Pulses, No clubbing, cyanosis, or edema  PSYCH: AAOx3  NEUROLOGY: non-focal  SKIN: No rashes or lesions    LABS:                        16.2   12.11 )-----------( 159      ( 16 Apr 2020 05:51 )             50.5     04-15    138  |  98  |  35<H>  ----------------------------<  168<H>  5.0   |  24  |  0.63    Ca    8.9      15 Apr 2020 07:12  Phos  2.8     04-15  Mg     2.3     04-15    TPro  6.9  /  Alb  2.2<L>  /  TBili  0.7  /  DBili  x   /  AST  29  /  ALT  12  /  AlkPhos  87  04-15              RADIOLOGY & ADDITIONAL TESTS:    Imaging Personally Reviewed:    Consultant(s) Notes Reviewed:      Care Discussed with Consultants/Other Providers: Patient is a 79y old  Male who presents with a chief complaint of covid r/o (15 Apr 2020 10:54)      SUBJECTIVE / OVERNIGHT EVENTS:  Patient has no new complaints. Denies cp, SOB, abdominal pain, N/V/D     MEDICATIONS  (STANDING):  ALBUTerol    90 MICROgram(s) HFA Inhaler 1 Puff(s) Inhalation every 4 hours  aspirin  chewable 81 milliGRAM(s) Oral daily  atorvastatin 20 milliGRAM(s) Oral at bedtime  carvedilol 25 milliGRAM(s) Oral every 12 hours  dextrose 5%. 1000 milliLiter(s) (50 mL/Hr) IV Continuous <Continuous>  dextrose 5%. 1000 milliLiter(s) (50 mL/Hr) IV Continuous <Continuous>  dextrose 50% Injectable 12.5 Gram(s) IV Push once  dextrose 50% Injectable 25 Gram(s) IV Push once  dextrose 50% Injectable 25 Gram(s) IV Push once  dextrose 50% Injectable 12.5 Gram(s) IV Push once  dextrose 50% Injectable 25 Gram(s) IV Push once  dextrose 50% Injectable 25 Gram(s) IV Push once  enoxaparin Injectable 80 milliGRAM(s) SubCutaneous two times a day  insulin glargine Injectable (LANTUS) 15 Unit(s) SubCutaneous at bedtime  insulin lispro (HumaLOG) corrective regimen sliding scale   SubCutaneous three times a day before meals  methylPREDNISolone sodium succinate Injectable 40 milliGRAM(s) IV Push every 12 hours  piperacillin/tazobactam IVPB.. 3.375 Gram(s) IV Intermittent every 8 hours  vancomycin  IVPB 1000 milliGRAM(s) IV Intermittent every 12 hours    MEDICATIONS  (PRN):  acetaminophen   Tablet .. 650 milliGRAM(s) Oral every 4 hours PRN Temp greater or equal to 38.5C (101.3F)  benzocaine 20% Spray 1 Spray(s) Topical three times a day PRN sore throat  dextrose 40% Gel 15 Gram(s) Oral once PRN Blood Glucose LESS THAN 70 milliGRAM(s)/deciLiter  dextrose 40% Gel 15 Gram(s) Oral once PRN Blood Glucose LESS THAN 70 milliGRAM(s)/deciliter  glucagon  Injectable 1 milliGRAM(s) IntraMuscular once PRN Glucose <70 milliGRAM(s)/deciLiter  glucagon  Injectable 1 milliGRAM(s) IntraMuscular once PRN Glucose LESS THAN 70 milligrams/deciliter  morphine  - Injectable 1 milliGRAM(s) IV Push every 4 hours PRN Respiratory distress      Vital Signs Last 24 Hrs  T(C): 36.8 (16 Apr 2020 09:57), Max: 36.8 (16 Apr 2020 09:57)  T(F): 98.3 (16 Apr 2020 09:57), Max: 98.3 (16 Apr 2020 09:57)  HR: 73 (16 Apr 2020 09:57) (63 - 73)  BP: 125/73 (16 Apr 2020 09:57) (123/50 - 142/60)  BP(mean): --  RR: 30 (16 Apr 2020 10:12) (22 - 48)  SpO2: 100% (16 Apr 2020 10:12) (72% - 100%)  CAPILLARY BLOOD GLUCOSE      POCT Blood Glucose.: 205 mg/dL (16 Apr 2020 07:22)  POCT Blood Glucose.: 234 mg/dL (15 Apr 2020 22:33)  POCT Blood Glucose.: 148 mg/dL (15 Apr 2020 16:39)  POCT Blood Glucose.: 198 mg/dL (15 Apr 2020 12:11)    I&O's Summary    15 Apr 2020 07:01  -  16 Apr 2020 07:00  --------------------------------------------------------  IN: 0 mL / OUT: 200 mL / NET: -200 mL        PHYSICAL EXAM:  GENERAL: NAD, well-developed  HEAD:  Atraumatic, Normocephalic  EYES: EOMI, PERRLA, conjunctiva and sclera clear  NECK: Supple, No JVD  CHEST/LUNG: decreased BS bilaterally; No wheeze  HEART: Regular rate and rhythm; No murmurs, rubs, or gallops  ABDOMEN: Soft, Nontender, Nondistended; Bowel sounds present  EXTREMITIES:  2+ Peripheral Pulses, No clubbing, cyanosis, or edema  PSYCH: AAOx3  NEUROLOGY: non-focal  SKIN: No rashes or lesions    LABS:                        16.2   12.11 )-----------( 159      ( 16 Apr 2020 05:51 )             50.5     04-15    138  |  98  |  35<H>  ----------------------------<  168<H>  5.0   |  24  |  0.63    Ca    8.9      15 Apr 2020 07:12  Phos  2.8     04-15  Mg     2.3     04-15    TPro  6.9  /  Alb  2.2<L>  /  TBili  0.7  /  DBili  x   /  AST  29  /  ALT  12  /  AlkPhos  87  04-15              RADIOLOGY & ADDITIONAL TESTS:    Imaging Personally Reviewed:    Consultant(s) Notes Reviewed:      Care Discussed with Consultants/Other Providers: Patient is a 79y old  Male who presents with a chief complaint of covid r/o (15 Apr 2020 10:54)      SUBJECTIVE / OVERNIGHT EVENTS:  Patient has no new complaints. Denies cp, SOB, abdominal pain, N/V/D     MEDICATIONS  (STANDING):  ALBUTerol    90 MICROgram(s) HFA Inhaler 1 Puff(s) Inhalation every 4 hours  aspirin  chewable 81 milliGRAM(s) Oral daily  atorvastatin 20 milliGRAM(s) Oral at bedtime  carvedilol 25 milliGRAM(s) Oral every 12 hours  dextrose 5%. 1000 milliLiter(s) (50 mL/Hr) IV Continuous <Continuous>  dextrose 5%. 1000 milliLiter(s) (50 mL/Hr) IV Continuous <Continuous>  dextrose 50% Injectable 12.5 Gram(s) IV Push once  dextrose 50% Injectable 25 Gram(s) IV Push once  dextrose 50% Injectable 25 Gram(s) IV Push once  dextrose 50% Injectable 12.5 Gram(s) IV Push once  dextrose 50% Injectable 25 Gram(s) IV Push once  dextrose 50% Injectable 25 Gram(s) IV Push once  enoxaparin Injectable 80 milliGRAM(s) SubCutaneous two times a day  insulin glargine Injectable (LANTUS) 15 Unit(s) SubCutaneous at bedtime  insulin lispro (HumaLOG) corrective regimen sliding scale   SubCutaneous three times a day before meals  methylPREDNISolone sodium succinate Injectable 40 milliGRAM(s) IV Push every 12 hours  piperacillin/tazobactam IVPB.. 3.375 Gram(s) IV Intermittent every 8 hours  vancomycin  IVPB 1000 milliGRAM(s) IV Intermittent every 12 hours    MEDICATIONS  (PRN):  acetaminophen   Tablet .. 650 milliGRAM(s) Oral every 4 hours PRN Temp greater or equal to 38.5C (101.3F)  benzocaine 20% Spray 1 Spray(s) Topical three times a day PRN sore throat  dextrose 40% Gel 15 Gram(s) Oral once PRN Blood Glucose LESS THAN 70 milliGRAM(s)/deciLiter  dextrose 40% Gel 15 Gram(s) Oral once PRN Blood Glucose LESS THAN 70 milliGRAM(s)/deciliter  glucagon  Injectable 1 milliGRAM(s) IntraMuscular once PRN Glucose <70 milliGRAM(s)/deciLiter  glucagon  Injectable 1 milliGRAM(s) IntraMuscular once PRN Glucose LESS THAN 70 milligrams/deciliter  morphine  - Injectable 1 milliGRAM(s) IV Push every 4 hours PRN Respiratory distress      Vital Signs Last 24 Hrs  T(C): 36.8 (16 Apr 2020 09:57), Max: 36.8 (16 Apr 2020 09:57)  T(F): 98.3 (16 Apr 2020 09:57), Max: 98.3 (16 Apr 2020 09:57)  HR: 73 (16 Apr 2020 09:57) (63 - 73)  BP: 125/73 (16 Apr 2020 09:57) (123/50 - 142/60)  BP(mean): --  RR: 30 (16 Apr 2020 10:12) (22 - 48)  SpO2: 100% (16 Apr 2020 10:12) (72% - 100%)  CAPILLARY BLOOD GLUCOSE      POCT Blood Glucose.: 205 mg/dL (16 Apr 2020 07:22)  POCT Blood Glucose.: 234 mg/dL (15 Apr 2020 22:33)  POCT Blood Glucose.: 148 mg/dL (15 Apr 2020 16:39)  POCT Blood Glucose.: 198 mg/dL (15 Apr 2020 12:11)    I&O's Summary    15 Apr 2020 07:01  -  16 Apr 2020 07:00  --------------------------------------------------------  IN: 0 mL / OUT: 200 mL / NET: -200 mL        PHYSICAL EXAM:  GENERAL: NAD, well-developed  HEAD:  Atraumatic, Normocephalic  EYES: EOMI, PERRLA, conjunctiva and sclera clear  NECK: Supple, No JVD  CHEST/LUNG: decreased BS bilaterally; No wheeze  HEART: Regular rate and rhythm; No murmurs, rubs, or gallops  ABDOMEN: Soft, Nontender, Nondistended; Bowel sounds present  EXTREMITIES:  2+ Peripheral Pulses, No clubbing, cyanosis, or edema  PSYCH: AAOx3  NEUROLOGY: non-focal  SKIN: No rashes or lesions    LABS:                        16.2   12.11 )-----------( 159      ( 16 Apr 2020 05:51 )             50.5     04-15    138  |  98  |  35<H>  ----------------------------<  168<H>  5.0   |  24  |  0.63    Ca    8.9      15 Apr 2020 07:12  Phos  2.8     04-15  Mg     2.3     04-15    TPro  6.9  /  Alb  2.2<L>  /  TBili  0.7  /  DBili  x   /  AST  29  /  ALT  12  /  AlkPhos  87  04-15              RADIOLOGY & ADDITIONAL TESTS:    Imaging Personally Reviewed: < from: Xray Chest 1 View-PORTABLE IMMEDIATE (04.16.20 @ 10:48) >  IMPRESSION:    Worsening moderate to severe bilateral parenchymal infiltrates compared with the patient's previous examination.      < end of copied text >      Consultant(s) Notes Reviewed:      Care Discussed with Consultants/Other Providers:

## 2020-04-16 NOTE — PROGRESS NOTE ADULT - PROBLEM SELECTOR PLAN 1
marked clinical improvement after lasix , solumedrol, therapeutic Lovenox, and abx given 4/13.  -CXR with b/l opacities  - completed 6 days of hydroxychloroquine, inflammatory markers trending down   - c/w  Anakinra   c/w IV solumedrol, Zosyn/vanco  c/w therapeutic Lovenox since may have a PE; will check CTPA when respiratory status further improves.   - Patient now DNR/DNI marked clinical improvement after lasix , solumedrol, therapeutic Lovenox, and abx given 4/13.  -CXR with b/l opacities  - completed 6 days of hydroxychloroquine, inflammatory markers trending down   - c/w  Anakinra   c/w IV solumedrol, Zosyn/vanco; will try to taper steroid tomorrow.  c/w therapeutic Lovenox since may have a PE; will check CTPA today.   - Patient now DNR/DNI marked clinical improvement after lasix , solumedrol, therapeutic Lovenox, and abx given 4/13.  -CXR 4/16 shows worsening b/l opacities  - completed 6 days of hydroxychloroquine, inflammatory markers trending down   - c/w  Anakinra   c/w IV solumedrol, Zosyn/vanco; will try to taper steroid tomorrow.  c/w therapeutic Lovenox since may have a PE; Check CTPA when less hypoxic.  start Lasix 40mg IV q12hr since CXR worsened and may be component of CHF.   - Patient now DNR/DNI marked clinical improvement after lasix , solumedrol, therapeutic Lovenox, and abx given 4/13.  -CXR 4/16 shows worsening b/l opacities  - completed 6 days of hydroxychloroquine, inflammatory markers trending down   - c/w  Anakinra   c/w Zosyn/vanco  c/w therapeutic Lovenox since may have a PE; Check CTPA when less hypoxic.  start Lasix 40mg IV q12hr since CXR worsened and may be component of CHF.   - Patient now DNR/DNI

## 2020-04-17 LAB
ANION GAP SERPL CALC-SCNC: 12 MMO/L — SIGNIFICANT CHANGE UP (ref 7–14)
BUN SERPL-MCNC: 26 MG/DL — HIGH (ref 7–23)
CALCIUM SERPL-MCNC: 8.7 MG/DL — SIGNIFICANT CHANGE UP (ref 8.4–10.5)
CHLORIDE SERPL-SCNC: 93 MMOL/L — LOW (ref 98–107)
CO2 SERPL-SCNC: 37 MMOL/L — HIGH (ref 22–31)
CREAT SERPL-MCNC: 0.74 MG/DL — SIGNIFICANT CHANGE UP (ref 0.5–1.3)
GLUCOSE BLDC GLUCOMTR-MCNC: 117 MG/DL — HIGH (ref 70–99)
GLUCOSE BLDC GLUCOMTR-MCNC: 159 MG/DL — HIGH (ref 70–99)
GLUCOSE BLDC GLUCOMTR-MCNC: 188 MG/DL — HIGH (ref 70–99)
GLUCOSE BLDC GLUCOMTR-MCNC: 189 MG/DL — HIGH (ref 70–99)
GLUCOSE SERPL-MCNC: 104 MG/DL — HIGH (ref 70–99)
HCT VFR BLD CALC: 49.1 % — SIGNIFICANT CHANGE UP (ref 39–50)
HGB BLD-MCNC: 16.2 G/DL — SIGNIFICANT CHANGE UP (ref 13–17)
MCHC RBC-ENTMCNC: 30.8 PG — SIGNIFICANT CHANGE UP (ref 27–34)
MCHC RBC-ENTMCNC: 33 % — SIGNIFICANT CHANGE UP (ref 32–36)
MCV RBC AUTO: 93.3 FL — SIGNIFICANT CHANGE UP (ref 80–100)
NRBC # FLD: 0 K/UL — SIGNIFICANT CHANGE UP (ref 0–0)
PLATELET # BLD AUTO: 159 K/UL — SIGNIFICANT CHANGE UP (ref 150–400)
PMV BLD: 11.2 FL — SIGNIFICANT CHANGE UP (ref 7–13)
POTASSIUM SERPL-MCNC: 3.6 MMOL/L — SIGNIFICANT CHANGE UP (ref 3.5–5.3)
POTASSIUM SERPL-SCNC: 3.6 MMOL/L — SIGNIFICANT CHANGE UP (ref 3.5–5.3)
RBC # BLD: 5.26 M/UL — SIGNIFICANT CHANGE UP (ref 4.2–5.8)
RBC # FLD: 12 % — SIGNIFICANT CHANGE UP (ref 10.3–14.5)
SODIUM SERPL-SCNC: 142 MMOL/L — SIGNIFICANT CHANGE UP (ref 135–145)
VANCOMYCIN TROUGH SERPL-MCNC: 12.9 UG/ML — SIGNIFICANT CHANGE UP (ref 10–20)
WBC # BLD: 11.14 K/UL — HIGH (ref 3.8–10.5)
WBC # FLD AUTO: 11.14 K/UL — HIGH (ref 3.8–10.5)

## 2020-04-17 PROCEDURE — 99233 SBSQ HOSP IP/OBS HIGH 50: CPT

## 2020-04-17 RX ORDER — POTASSIUM CHLORIDE 20 MEQ
20 PACKET (EA) ORAL ONCE
Refills: 0 | Status: COMPLETED | OUTPATIENT
Start: 2020-04-17 | End: 2020-04-17

## 2020-04-17 RX ORDER — LANOLIN ALCOHOL/MO/W.PET/CERES
6 CREAM (GRAM) TOPICAL AT BEDTIME
Refills: 0 | Status: DISCONTINUED | OUTPATIENT
Start: 2020-04-17 | End: 2020-04-22

## 2020-04-17 RX ADMIN — Medication 81 MILLIGRAM(S): at 11:55

## 2020-04-17 RX ADMIN — PIPERACILLIN AND TAZOBACTAM 25 GRAM(S): 4; .5 INJECTION, POWDER, LYOPHILIZED, FOR SOLUTION INTRAVENOUS at 22:34

## 2020-04-17 RX ADMIN — ENOXAPARIN SODIUM 80 MILLIGRAM(S): 100 INJECTION SUBCUTANEOUS at 06:13

## 2020-04-17 RX ADMIN — Medication 1: at 17:11

## 2020-04-17 RX ADMIN — Medication 40 MILLIGRAM(S): at 08:45

## 2020-04-17 RX ADMIN — ENOXAPARIN SODIUM 80 MILLIGRAM(S): 100 INJECTION SUBCUTANEOUS at 17:15

## 2020-04-17 RX ADMIN — CARVEDILOL PHOSPHATE 25 MILLIGRAM(S): 80 CAPSULE, EXTENDED RELEASE ORAL at 17:13

## 2020-04-17 RX ADMIN — Medication 1: at 11:55

## 2020-04-17 RX ADMIN — CARVEDILOL PHOSPHATE 25 MILLIGRAM(S): 80 CAPSULE, EXTENDED RELEASE ORAL at 06:10

## 2020-04-17 RX ADMIN — ALBUTEROL 1 PUFF(S): 90 AEROSOL, METERED ORAL at 17:13

## 2020-04-17 RX ADMIN — ALBUTEROL 1 PUFF(S): 90 AEROSOL, METERED ORAL at 22:33

## 2020-04-17 RX ADMIN — Medication 250 MILLIGRAM(S): at 19:10

## 2020-04-17 RX ADMIN — ALBUTEROL 1 PUFF(S): 90 AEROSOL, METERED ORAL at 02:19

## 2020-04-17 RX ADMIN — Medication 6 MILLIGRAM(S): at 22:59

## 2020-04-17 RX ADMIN — Medication 40 MILLIGRAM(S): at 12:04

## 2020-04-17 RX ADMIN — Medication 20 MILLIEQUIVALENT(S): at 09:45

## 2020-04-17 RX ADMIN — Medication 40 MILLIGRAM(S): at 17:13

## 2020-04-17 RX ADMIN — Medication 40 MILLIGRAM(S): at 22:34

## 2020-04-17 RX ADMIN — ALBUTEROL 1 PUFF(S): 90 AEROSOL, METERED ORAL at 06:10

## 2020-04-17 RX ADMIN — ALBUTEROL 1 PUFF(S): 90 AEROSOL, METERED ORAL at 09:45

## 2020-04-17 RX ADMIN — Medication 250 MILLIGRAM(S): at 08:45

## 2020-04-17 RX ADMIN — PIPERACILLIN AND TAZOBACTAM 25 GRAM(S): 4; .5 INJECTION, POWDER, LYOPHILIZED, FOR SOLUTION INTRAVENOUS at 13:07

## 2020-04-17 RX ADMIN — ATORVASTATIN CALCIUM 20 MILLIGRAM(S): 80 TABLET, FILM COATED ORAL at 22:33

## 2020-04-17 RX ADMIN — ALBUTEROL 1 PUFF(S): 90 AEROSOL, METERED ORAL at 13:07

## 2020-04-17 RX ADMIN — PIPERACILLIN AND TAZOBACTAM 25 GRAM(S): 4; .5 INJECTION, POWDER, LYOPHILIZED, FOR SOLUTION INTRAVENOUS at 06:09

## 2020-04-17 RX ADMIN — INSULIN GLARGINE 15 UNIT(S): 100 INJECTION, SOLUTION SUBCUTANEOUS at 22:34

## 2020-04-17 NOTE — PROGRESS NOTE ADULT - SUBJECTIVE AND OBJECTIVE BOX
Patient is a 79y old  Male who presents with a chief complaint of covid r/o (16 Apr 2020 10:16)      SUBJECTIVE / OVERNIGHT EVENTS:  Patient more SOB today. Denies cp, abdominal pain, N/V/D     MEDICATIONS  (STANDING):  ALBUTerol    90 MICROgram(s) HFA Inhaler 1 Puff(s) Inhalation every 4 hours  aspirin  chewable 81 milliGRAM(s) Oral daily  atorvastatin 20 milliGRAM(s) Oral at bedtime  carvedilol 25 milliGRAM(s) Oral every 12 hours  dextrose 5%. 1000 milliLiter(s) (50 mL/Hr) IV Continuous <Continuous>  dextrose 5%. 1000 milliLiter(s) (50 mL/Hr) IV Continuous <Continuous>  dextrose 50% Injectable 12.5 Gram(s) IV Push once  dextrose 50% Injectable 25 Gram(s) IV Push once  dextrose 50% Injectable 25 Gram(s) IV Push once  dextrose 50% Injectable 12.5 Gram(s) IV Push once  dextrose 50% Injectable 25 Gram(s) IV Push once  dextrose 50% Injectable 25 Gram(s) IV Push once  enoxaparin Injectable 80 milliGRAM(s) SubCutaneous two times a day  furosemide   Injectable 40 milliGRAM(s) IV Push every 12 hours  insulin glargine Injectable (LANTUS) 15 Unit(s) SubCutaneous at bedtime  insulin lispro (HumaLOG) corrective regimen sliding scale   SubCutaneous three times a day before meals  piperacillin/tazobactam IVPB.. 3.375 Gram(s) IV Intermittent every 8 hours  vancomycin  IVPB 1000 milliGRAM(s) IV Intermittent every 12 hours    MEDICATIONS  (PRN):  acetaminophen   Tablet .. 650 milliGRAM(s) Oral every 4 hours PRN Temp greater or equal to 38.5C (101.3F)  benzocaine 20% Spray 1 Spray(s) Topical three times a day PRN sore throat  dextrose 40% Gel 15 Gram(s) Oral once PRN Blood Glucose LESS THAN 70 milliGRAM(s)/deciLiter  dextrose 40% Gel 15 Gram(s) Oral once PRN Blood Glucose LESS THAN 70 milliGRAM(s)/deciliter  glucagon  Injectable 1 milliGRAM(s) IntraMuscular once PRN Glucose <70 milliGRAM(s)/deciLiter  glucagon  Injectable 1 milliGRAM(s) IntraMuscular once PRN Glucose LESS THAN 70 milligrams/deciliter      Vital Signs Last 24 Hrs  T(C): 36.4 (17 Apr 2020 09:03), Max: 36.7 (17 Apr 2020 06:07)  T(F): 97.5 (17 Apr 2020 09:03), Max: 98 (17 Apr 2020 06:07)  HR: 75 (17 Apr 2020 09:03) (67 - 75)  BP: 118/65 (17 Apr 2020 09:03) (108/54 - 123/72)  BP(mean): --  RR: 18 (17 Apr 2020 13:12) (18 - 32)  SpO2: 92% (17 Apr 2020 13:12) (83% - 100%)  CAPILLARY BLOOD GLUCOSE      POCT Blood Glucose.: 188 mg/dL (17 Apr 2020 11:43)  POCT Blood Glucose.: 117 mg/dL (17 Apr 2020 07:23)  POCT Blood Glucose.: 227 mg/dL (16 Apr 2020 22:18)  POCT Blood Glucose.: 206 mg/dL (16 Apr 2020 17:08)    I&O's Summary    16 Apr 2020 07:01  -  17 Apr 2020 07:00  --------------------------------------------------------  IN: 0 mL / OUT: 1050 mL / NET: -1050 mL    17 Apr 2020 07:01  -  17 Apr 2020 13:22  --------------------------------------------------------  IN: 0 mL / OUT: 700 mL / NET: -700 mL        PHYSICAL EXAM:  GENERAL: NAD, well-developed  HEAD:  Atraumatic, Normocephalic  EYES: EOMI, PERRLA, conjunctiva and sclera clear  NECK: Supple, No JVD  CHEST/LUNG: Clear to auscultation bilaterally; No wheeze  HEART: Regular rate and rhythm; No murmurs, rubs, or gallops  ABDOMEN: Soft, Nontender, Nondistended; Bowel sounds present  EXTREMITIES:  2+ Peripheral Pulses, No clubbing, cyanosis, or edema  PSYCH: AAOx3  NEUROLOGY: non-focal  SKIN: No rashes or lesions    LABS:                        16.2   11.14 )-----------( 159      ( 17 Apr 2020 05:46 )             49.1     04-17    142  |  93<L>  |  26<H>  ----------------------------<  104<H>  3.6   |  37<H>  |  0.74    Ca    8.7      17 Apr 2020 05:46                RADIOLOGY & ADDITIONAL TESTS:    Imaging Personally Reviewed:    Consultant(s) Notes Reviewed:      Care Discussed with Consultants/Other Providers:

## 2020-04-17 NOTE — PROGRESS NOTE ADULT - PROBLEM SELECTOR PLAN 1
marked clinical improvement after lasix , solumedrol, therapeutic Lovenox, and abx given 4/13.  Patient now SOB but O2SAT 100%. Will give a dose of solumedrol 40mg IV x 1 and monitor for improvement.   -CXR 4/16 shows worsening b/l opacities  - completed 6 days of hydroxychloroquine, inflammatory markers trending down   - c/w  Anakinra   c/w Zosyn/vanco  c/w therapeutic Lovenox since may have a PE; Check CTPA when less hypoxic.  c/w Lasix 40mg IV q12hr on 4/16 since CXR worsened and may be component of CHF.   - Patient now DNR/DNI

## 2020-04-18 LAB
ALBUMIN SERPL ELPH-MCNC: 2 G/DL — LOW (ref 3.3–5)
ALP SERPL-CCNC: 81 U/L — SIGNIFICANT CHANGE UP (ref 40–120)
ALT FLD-CCNC: 14 U/L — SIGNIFICANT CHANGE UP (ref 4–41)
ANION GAP SERPL CALC-SCNC: 19 MMO/L — HIGH (ref 7–14)
AST SERPL-CCNC: 31 U/L — SIGNIFICANT CHANGE UP (ref 4–40)
BILIRUB SERPL-MCNC: 0.8 MG/DL — SIGNIFICANT CHANGE UP (ref 0.2–1.2)
BUN SERPL-MCNC: 38 MG/DL — HIGH (ref 7–23)
CALCIUM SERPL-MCNC: 8.1 MG/DL — LOW (ref 8.4–10.5)
CHLORIDE SERPL-SCNC: 90 MMOL/L — LOW (ref 98–107)
CO2 SERPL-SCNC: 26 MMOL/L — SIGNIFICANT CHANGE UP (ref 22–31)
CREAT SERPL-MCNC: 0.81 MG/DL — SIGNIFICANT CHANGE UP (ref 0.5–1.3)
CRP SERPL-MCNC: 12.6 MG/L — HIGH
FERRITIN SERPL-MCNC: 1115 NG/ML — HIGH (ref 30–400)
GLUCOSE BLDC GLUCOMTR-MCNC: 145 MG/DL — HIGH (ref 70–99)
GLUCOSE BLDC GLUCOMTR-MCNC: 146 MG/DL — HIGH (ref 70–99)
GLUCOSE BLDC GLUCOMTR-MCNC: 157 MG/DL — HIGH (ref 70–99)
GLUCOSE BLDC GLUCOMTR-MCNC: 183 MG/DL — HIGH (ref 70–99)
GLUCOSE BLDC GLUCOMTR-MCNC: 192 MG/DL — HIGH (ref 70–99)
GLUCOSE BLDC GLUCOMTR-MCNC: 194 MG/DL — HIGH (ref 70–99)
GLUCOSE BLDC GLUCOMTR-MCNC: 213 MG/DL — HIGH (ref 70–99)
GLUCOSE SERPL-MCNC: 234 MG/DL — HIGH (ref 70–99)
HCT VFR BLD CALC: 52 % — HIGH (ref 39–50)
HGB BLD-MCNC: 17.1 G/DL — HIGH (ref 13–17)
LDH SERPL L TO P-CCNC: 544 U/L — HIGH (ref 135–225)
MCHC RBC-ENTMCNC: 30.8 PG — SIGNIFICANT CHANGE UP (ref 27–34)
MCHC RBC-ENTMCNC: 32.9 % — SIGNIFICANT CHANGE UP (ref 32–36)
MCV RBC AUTO: 93.7 FL — SIGNIFICANT CHANGE UP (ref 80–100)
NRBC # FLD: 0 K/UL — SIGNIFICANT CHANGE UP (ref 0–0)
PLATELET # BLD AUTO: 126 K/UL — LOW (ref 150–400)
PMV BLD: 10.7 FL — SIGNIFICANT CHANGE UP (ref 7–13)
POTASSIUM SERPL-MCNC: 4.2 MMOL/L — SIGNIFICANT CHANGE UP (ref 3.5–5.3)
POTASSIUM SERPL-SCNC: 4.2 MMOL/L — SIGNIFICANT CHANGE UP (ref 3.5–5.3)
PROCALCITONIN SERPL-MCNC: 0.09 NG/ML — SIGNIFICANT CHANGE UP (ref 0.02–0.1)
PROT SERPL-MCNC: 7.1 G/DL — SIGNIFICANT CHANGE UP (ref 6–8.3)
RBC # BLD: 5.55 M/UL — SIGNIFICANT CHANGE UP (ref 4.2–5.8)
RBC # FLD: 12.2 % — SIGNIFICANT CHANGE UP (ref 10.3–14.5)
SODIUM SERPL-SCNC: 135 MMOL/L — SIGNIFICANT CHANGE UP (ref 135–145)
WBC # BLD: 11.06 K/UL — HIGH (ref 3.8–10.5)
WBC # FLD AUTO: 11.06 K/UL — HIGH (ref 3.8–10.5)

## 2020-04-18 PROCEDURE — 71045 X-RAY EXAM CHEST 1 VIEW: CPT | Mod: 26

## 2020-04-18 PROCEDURE — 99233 SBSQ HOSP IP/OBS HIGH 50: CPT

## 2020-04-18 RX ADMIN — Medication 40 MILLIGRAM(S): at 05:53

## 2020-04-18 RX ADMIN — ALBUTEROL 1 PUFF(S): 90 AEROSOL, METERED ORAL at 03:20

## 2020-04-18 RX ADMIN — CARVEDILOL PHOSPHATE 25 MILLIGRAM(S): 80 CAPSULE, EXTENDED RELEASE ORAL at 18:26

## 2020-04-18 RX ADMIN — Medication 250 MILLIGRAM(S): at 06:00

## 2020-04-18 RX ADMIN — Medication 1: at 09:34

## 2020-04-18 RX ADMIN — Medication 81 MILLIGRAM(S): at 13:14

## 2020-04-18 RX ADMIN — ALBUTEROL 1 PUFF(S): 90 AEROSOL, METERED ORAL at 11:52

## 2020-04-18 RX ADMIN — ENOXAPARIN SODIUM 80 MILLIGRAM(S): 100 INJECTION SUBCUTANEOUS at 05:52

## 2020-04-18 RX ADMIN — ALBUTEROL 1 PUFF(S): 90 AEROSOL, METERED ORAL at 18:26

## 2020-04-18 RX ADMIN — Medication 40 MILLIGRAM(S): at 23:49

## 2020-04-18 RX ADMIN — ALBUTEROL 1 PUFF(S): 90 AEROSOL, METERED ORAL at 13:37

## 2020-04-18 RX ADMIN — ENOXAPARIN SODIUM 80 MILLIGRAM(S): 100 INJECTION SUBCUTANEOUS at 18:27

## 2020-04-18 RX ADMIN — PIPERACILLIN AND TAZOBACTAM 25 GRAM(S): 4; .5 INJECTION, POWDER, LYOPHILIZED, FOR SOLUTION INTRAVENOUS at 13:36

## 2020-04-18 RX ADMIN — Medication 250 MILLIGRAM(S): at 18:28

## 2020-04-18 RX ADMIN — Medication 6 MILLIGRAM(S): at 23:49

## 2020-04-18 RX ADMIN — Medication 1: at 18:49

## 2020-04-18 RX ADMIN — ALBUTEROL 1 PUFF(S): 90 AEROSOL, METERED ORAL at 23:39

## 2020-04-18 RX ADMIN — ATORVASTATIN CALCIUM 20 MILLIGRAM(S): 80 TABLET, FILM COATED ORAL at 23:51

## 2020-04-18 RX ADMIN — CARVEDILOL PHOSPHATE 25 MILLIGRAM(S): 80 CAPSULE, EXTENDED RELEASE ORAL at 05:52

## 2020-04-18 RX ADMIN — ALBUTEROL 1 PUFF(S): 90 AEROSOL, METERED ORAL at 05:52

## 2020-04-18 RX ADMIN — Medication 40 MILLIGRAM(S): at 18:26

## 2020-04-18 RX ADMIN — INSULIN GLARGINE 15 UNIT(S): 100 INJECTION, SOLUTION SUBCUTANEOUS at 23:48

## 2020-04-18 RX ADMIN — PIPERACILLIN AND TAZOBACTAM 25 GRAM(S): 4; .5 INJECTION, POWDER, LYOPHILIZED, FOR SOLUTION INTRAVENOUS at 23:41

## 2020-04-18 NOTE — PROGRESS NOTE ADULT - SUBJECTIVE AND OBJECTIVE BOX
Patient is a 79y old  Male who presents with a chief complaint of covid r/o (17 Apr 2020 13:22)      SUBJECTIVE / OVERNIGHT EVENTS:    MEDICATIONS  (STANDING):  ALBUTerol    90 MICROgram(s) HFA Inhaler 1 Puff(s) Inhalation every 4 hours  aspirin  chewable 81 milliGRAM(s) Oral daily  atorvastatin 20 milliGRAM(s) Oral at bedtime  carvedilol 25 milliGRAM(s) Oral every 12 hours  dextrose 5%. 1000 milliLiter(s) (50 mL/Hr) IV Continuous <Continuous>  dextrose 5%. 1000 milliLiter(s) (50 mL/Hr) IV Continuous <Continuous>  dextrose 50% Injectable 12.5 Gram(s) IV Push once  dextrose 50% Injectable 25 Gram(s) IV Push once  dextrose 50% Injectable 25 Gram(s) IV Push once  dextrose 50% Injectable 12.5 Gram(s) IV Push once  dextrose 50% Injectable 25 Gram(s) IV Push once  dextrose 50% Injectable 25 Gram(s) IV Push once  enoxaparin Injectable 80 milliGRAM(s) SubCutaneous two times a day  furosemide   Injectable 40 milliGRAM(s) IV Push every 12 hours  insulin glargine Injectable (LANTUS) 15 Unit(s) SubCutaneous at bedtime  insulin lispro (HumaLOG) corrective regimen sliding scale   SubCutaneous three times a day before meals  melatonin 6 milliGRAM(s) Oral at bedtime  methylPREDNISolone sodium succinate Injectable 40 milliGRAM(s) IV Push every 12 hours  piperacillin/tazobactam IVPB.. 3.375 Gram(s) IV Intermittent every 8 hours  vancomycin  IVPB 1000 milliGRAM(s) IV Intermittent every 12 hours    MEDICATIONS  (PRN):  acetaminophen   Tablet .. 650 milliGRAM(s) Oral every 4 hours PRN Temp greater or equal to 38.5C (101.3F)  benzocaine 20% Spray 1 Spray(s) Topical three times a day PRN sore throat  dextrose 40% Gel 15 Gram(s) Oral once PRN Blood Glucose LESS THAN 70 milliGRAM(s)/deciLiter  dextrose 40% Gel 15 Gram(s) Oral once PRN Blood Glucose LESS THAN 70 milliGRAM(s)/deciliter  glucagon  Injectable 1 milliGRAM(s) IntraMuscular once PRN Glucose <70 milliGRAM(s)/deciLiter  glucagon  Injectable 1 milliGRAM(s) IntraMuscular once PRN Glucose LESS THAN 70 milligrams/deciliter      Vital Signs Last 24 Hrs  T(C): 36.3 (18 Apr 2020 11:00), Max: 36.7 (17 Apr 2020 17:50)  T(F): 97.4 (18 Apr 2020 11:00), Max: 98.1 (17 Apr 2020 17:50)  HR: 78 (18 Apr 2020 11:00) (66 - 78)  BP: 90/51 (18 Apr 2020 11:00) (90/51 - 136/73)  BP(mean): --  RR: 30 (18 Apr 2020 11:00) (18 - 30)  SpO2: 72% (18 Apr 2020 11:00) (63% - 100%)  CAPILLARY BLOOD GLUCOSE      POCT Blood Glucose.: 145 mg/dL (18 Apr 2020 12:33)  POCT Blood Glucose.: 192 mg/dL (18 Apr 2020 09:33)  POCT Blood Glucose.: 213 mg/dL (18 Apr 2020 08:05)  POCT Blood Glucose.: 189 mg/dL (17 Apr 2020 22:06)  POCT Blood Glucose.: 159 mg/dL (17 Apr 2020 16:54)    I&O's Summary    17 Apr 2020 07:01  -  18 Apr 2020 07:00  --------------------------------------------------------  IN: 0 mL / OUT: 900 mL / NET: -900 mL    18 Apr 2020 07:01  -  18 Apr 2020 14:15  --------------------------------------------------------  IN: 0 mL / OUT: 200 mL / NET: -200 mL        PHYSICAL EXAM:  GENERAL: NAD, well-developed  HEAD:  Atraumatic, Normocephalic  EYES: EOMI, PERRLA, conjunctiva and sclera clear  NECK: Supple, No JVD  CHEST/LUNG: Clear to auscultation bilaterally; No wheeze  HEART: Regular rate and rhythm; No murmurs, rubs, or gallops  ABDOMEN: Soft, Nontender, Nondistended; Bowel sounds present  EXTREMITIES:  2+ Peripheral Pulses, No clubbing, cyanosis, or edema  PSYCH: AAOx3  NEUROLOGY: non-focal  SKIN: No rashes or lesions    LABS:                        17.1   11.06 )-----------( 126      ( 18 Apr 2020 07:51 )             52.0     04-18    135  |  90<L>  |  38<H>  ----------------------------<  234<H>  4.2   |  26  |  0.81    Ca    8.1<L>      18 Apr 2020 07:51    TPro  7.1  /  Alb  2.0<L>  /  TBili  0.8  /  DBili  x   /  AST  31  /  ALT  14  /  AlkPhos  81  04-18              RADIOLOGY & ADDITIONAL TESTS:    Imaging Personally Reviewed:    Consultant(s) Notes Reviewed:      Care Discussed with Consultants/Other Providers: Patient is a 79y old  Male who presents with a chief complaint of covid r/o (17 Apr 2020 13:22)      SUBJECTIVE / OVERNIGHT EVENTS:  Patient more SOB and tachypneic but O2 Sat 100% on NRB. Denies cp, abdominal pain, N/V/D     MEDICATIONS  (STANDING):  ALBUTerol    90 MICROgram(s) HFA Inhaler 1 Puff(s) Inhalation every 4 hours  aspirin  chewable 81 milliGRAM(s) Oral daily  atorvastatin 20 milliGRAM(s) Oral at bedtime  carvedilol 25 milliGRAM(s) Oral every 12 hours  dextrose 5%. 1000 milliLiter(s) (50 mL/Hr) IV Continuous <Continuous>  dextrose 5%. 1000 milliLiter(s) (50 mL/Hr) IV Continuous <Continuous>  dextrose 50% Injectable 12.5 Gram(s) IV Push once  dextrose 50% Injectable 25 Gram(s) IV Push once  dextrose 50% Injectable 25 Gram(s) IV Push once  dextrose 50% Injectable 12.5 Gram(s) IV Push once  dextrose 50% Injectable 25 Gram(s) IV Push once  dextrose 50% Injectable 25 Gram(s) IV Push once  enoxaparin Injectable 80 milliGRAM(s) SubCutaneous two times a day  furosemide   Injectable 40 milliGRAM(s) IV Push every 12 hours  insulin glargine Injectable (LANTUS) 15 Unit(s) SubCutaneous at bedtime  insulin lispro (HumaLOG) corrective regimen sliding scale   SubCutaneous three times a day before meals  melatonin 6 milliGRAM(s) Oral at bedtime  methylPREDNISolone sodium succinate Injectable 40 milliGRAM(s) IV Push every 12 hours  piperacillin/tazobactam IVPB.. 3.375 Gram(s) IV Intermittent every 8 hours  vancomycin  IVPB 1000 milliGRAM(s) IV Intermittent every 12 hours    MEDICATIONS  (PRN):  acetaminophen   Tablet .. 650 milliGRAM(s) Oral every 4 hours PRN Temp greater or equal to 38.5C (101.3F)  benzocaine 20% Spray 1 Spray(s) Topical three times a day PRN sore throat  dextrose 40% Gel 15 Gram(s) Oral once PRN Blood Glucose LESS THAN 70 milliGRAM(s)/deciLiter  dextrose 40% Gel 15 Gram(s) Oral once PRN Blood Glucose LESS THAN 70 milliGRAM(s)/deciliter  glucagon  Injectable 1 milliGRAM(s) IntraMuscular once PRN Glucose <70 milliGRAM(s)/deciLiter  glucagon  Injectable 1 milliGRAM(s) IntraMuscular once PRN Glucose LESS THAN 70 milligrams/deciliter      Vital Signs Last 24 Hrs  T(C): 36.3 (18 Apr 2020 11:00), Max: 36.7 (17 Apr 2020 17:50)  T(F): 97.4 (18 Apr 2020 11:00), Max: 98.1 (17 Apr 2020 17:50)  HR: 78 (18 Apr 2020 11:00) (66 - 78)  BP: 90/51 (18 Apr 2020 11:00) (90/51 - 136/73)  BP(mean): --  RR: 30 (18 Apr 2020 11:00) (18 - 30)  SpO2: 72% (18 Apr 2020 11:00) (63% - 100%)  CAPILLARY BLOOD GLUCOSE      POCT Blood Glucose.: 145 mg/dL (18 Apr 2020 12:33)  POCT Blood Glucose.: 192 mg/dL (18 Apr 2020 09:33)  POCT Blood Glucose.: 213 mg/dL (18 Apr 2020 08:05)  POCT Blood Glucose.: 189 mg/dL (17 Apr 2020 22:06)  POCT Blood Glucose.: 159 mg/dL (17 Apr 2020 16:54)    I&O's Summary    17 Apr 2020 07:01  -  18 Apr 2020 07:00  --------------------------------------------------------  IN: 0 mL / OUT: 900 mL / NET: -900 mL    18 Apr 2020 07:01  -  18 Apr 2020 14:15  --------------------------------------------------------  IN: 0 mL / OUT: 200 mL / NET: -200 mL        PHYSICAL EXAM:  GENERAL: NAD, well-developed  HEAD:  Atraumatic, Normocephalic  EYES: EOMI, PERRLA, conjunctiva and sclera clear  NECK: Supple, No JVD  CHEST/LUNG: decreased BS bilaterally; No wheeze  HEART: Regular rate and rhythm; No murmurs, rubs, or gallops  ABDOMEN: Soft, Nontender, Nondistended; Bowel sounds present  EXTREMITIES:  2+ Peripheral Pulses, No clubbing, cyanosis, or edema  PSYCH: AAOx3  NEUROLOGY: non-focal  SKIN: No rashes or lesions    LABS:                        17.1   11.06 )-----------( 126      ( 18 Apr 2020 07:51 )             52.0     04-18    135  |  90<L>  |  38<H>  ----------------------------<  234<H>  4.2   |  26  |  0.81    Ca    8.1<L>      18 Apr 2020 07:51    TPro  7.1  /  Alb  2.0<L>  /  TBili  0.8  /  DBili  x   /  AST  31  /  ALT  14  /  AlkPhos  81  04-18              RADIOLOGY & ADDITIONAL TESTS:    Imaging Personally Reviewed:    Consultant(s) Notes Reviewed:      Care Discussed with Consultants/Other Providers:

## 2020-04-18 NOTE — PROGRESS NOTE ADULT - PROBLEM SELECTOR PLAN 2
C/w anakinra  Completed 5 days of Plaquenil  s/p Solumedrol   c/w zosyn/Vanco  c/w anakinra  Called Blood bank to give plasma for COVID 19 @ [phone] and they will call back in 24 hours to tell me if he qualifies.

## 2020-04-18 NOTE — PROGRESS NOTE ADULT - PROBLEM SELECTOR PLAN 1
had clinical improvement after lasix , solumedrol, therapeutic Lovenox, and abx on 4/13.  Patient has been SOB x 48 hours but O2Sat 100% on NRB   -CXR 4/16 shows worsening b/l opacities  - completed 6 days of hydroxychloroquine, inflammatory markers trending down   - c/w  Anakinra   c/w Zosyn/vanco  c/w therapeutic Lovenox since may have a PE; Check CTPA when less hypoxic and able to lie flat.  Holding  Lasix 40mg IV q12hr since mild hypotension today.   Spoke to Pulmonary and patient not a candidate for tosculimab b/c already got anakinra and CRP only 12  -Called Blood bank to give plasma for COVID 19 @ [phone] and they will call back in 24 hours to tell me if he qualifies.   - Patient now DNR/DNI

## 2020-04-19 LAB
ALBUMIN SERPL ELPH-MCNC: 2.4 G/DL — LOW (ref 3.3–5)
ALP SERPL-CCNC: 85 U/L — SIGNIFICANT CHANGE UP (ref 40–120)
ALT FLD-CCNC: 14 U/L — SIGNIFICANT CHANGE UP (ref 4–41)
ANION GAP SERPL CALC-SCNC: 16 MMO/L — HIGH (ref 7–14)
AST SERPL-CCNC: 29 U/L — SIGNIFICANT CHANGE UP (ref 4–40)
BASE EXCESS BLDA CALC-SCNC: 14.3 MMOL/L — SIGNIFICANT CHANGE UP
BILIRUB SERPL-MCNC: 0.9 MG/DL — SIGNIFICANT CHANGE UP (ref 0.2–1.2)
BUN SERPL-MCNC: 37 MG/DL — HIGH (ref 7–23)
CALCIUM SERPL-MCNC: 8.5 MG/DL — SIGNIFICANT CHANGE UP (ref 8.4–10.5)
CHLORIDE SERPL-SCNC: 90 MMOL/L — LOW (ref 98–107)
CO2 SERPL-SCNC: 31 MMOL/L — SIGNIFICANT CHANGE UP (ref 22–31)
CREAT SERPL-MCNC: 0.91 MG/DL — SIGNIFICANT CHANGE UP (ref 0.5–1.3)
GLUCOSE BLDA-MCNC: 173 MG/DL — HIGH (ref 70–99)
GLUCOSE BLDC GLUCOMTR-MCNC: 134 MG/DL — HIGH (ref 70–99)
GLUCOSE BLDC GLUCOMTR-MCNC: 148 MG/DL — HIGH (ref 70–99)
GLUCOSE BLDC GLUCOMTR-MCNC: 159 MG/DL — HIGH (ref 70–99)
GLUCOSE BLDC GLUCOMTR-MCNC: 162 MG/DL — HIGH (ref 70–99)
GLUCOSE SERPL-MCNC: 151 MG/DL — HIGH (ref 70–99)
HCO3 BLDA-SCNC: 34 MMOL/L — HIGH (ref 22–26)
HCT VFR BLD CALC: 50.1 % — HIGH (ref 39–50)
HCT VFR BLDA CALC: 50.8 % — SIGNIFICANT CHANGE UP (ref 39–51)
HGB BLD-MCNC: 16.4 G/DL — SIGNIFICANT CHANGE UP (ref 13–17)
HGB BLDA-MCNC: 16.6 G/DL — SIGNIFICANT CHANGE UP (ref 13–17)
MCHC RBC-ENTMCNC: 30.5 PG — SIGNIFICANT CHANGE UP (ref 27–34)
MCHC RBC-ENTMCNC: 32.7 % — SIGNIFICANT CHANGE UP (ref 32–36)
MCV RBC AUTO: 93.1 FL — SIGNIFICANT CHANGE UP (ref 80–100)
NRBC # FLD: 0 K/UL — SIGNIFICANT CHANGE UP (ref 0–0)
PCO2 BLDA: 65 MMHG — HIGH (ref 35–48)
PH BLDA: 7.41 PH — SIGNIFICANT CHANGE UP (ref 7.35–7.45)
PLATELET # BLD AUTO: 115 K/UL — LOW (ref 150–400)
PMV BLD: 10.7 FL — SIGNIFICANT CHANGE UP (ref 7–13)
PO2 BLDA: 36 MMHG — CRITICAL LOW (ref 83–108)
POTASSIUM BLDA-SCNC: 3.7 MMOL/L — SIGNIFICANT CHANGE UP (ref 3.4–4.5)
POTASSIUM SERPL-MCNC: 4.2 MMOL/L — SIGNIFICANT CHANGE UP (ref 3.5–5.3)
POTASSIUM SERPL-SCNC: 4.2 MMOL/L — SIGNIFICANT CHANGE UP (ref 3.5–5.3)
PROT SERPL-MCNC: 7.4 G/DL — SIGNIFICANT CHANGE UP (ref 6–8.3)
RBC # BLD: 5.38 M/UL — SIGNIFICANT CHANGE UP (ref 4.2–5.8)
RBC # FLD: 12.4 % — SIGNIFICANT CHANGE UP (ref 10.3–14.5)
SAO2 % BLDA: 61.8 % — LOW (ref 95–99)
SODIUM BLDA-SCNC: 137 MMOL/L — SIGNIFICANT CHANGE UP (ref 136–146)
SODIUM SERPL-SCNC: 137 MMOL/L — SIGNIFICANT CHANGE UP (ref 135–145)
VANCOMYCIN FLD-MCNC: 21.2 UG/ML — SIGNIFICANT CHANGE UP
WBC # BLD: 14.44 K/UL — HIGH (ref 3.8–10.5)
WBC # FLD AUTO: 14.44 K/UL — HIGH (ref 3.8–10.5)

## 2020-04-19 PROCEDURE — 99233 SBSQ HOSP IP/OBS HIGH 50: CPT

## 2020-04-19 PROCEDURE — 99291 CRITICAL CARE FIRST HOUR: CPT

## 2020-04-19 RX ORDER — FUROSEMIDE 40 MG
40 TABLET ORAL ONCE
Refills: 0 | Status: DISCONTINUED | OUTPATIENT
Start: 2020-04-19 | End: 2020-04-22

## 2020-04-19 RX ORDER — MORPHINE SULFATE 50 MG/1
1 CAPSULE, EXTENDED RELEASE ORAL ONCE
Refills: 0 | Status: DISCONTINUED | OUTPATIENT
Start: 2020-04-19 | End: 2020-04-19

## 2020-04-19 RX ORDER — VANCOMYCIN HCL 1 G
750 VIAL (EA) INTRAVENOUS EVERY 12 HOURS
Refills: 0 | Status: DISCONTINUED | OUTPATIENT
Start: 2020-04-20 | End: 2020-04-21

## 2020-04-19 RX ADMIN — Medication 40 MILLIGRAM(S): at 06:32

## 2020-04-19 RX ADMIN — Medication 1: at 08:23

## 2020-04-19 RX ADMIN — ALBUTEROL 1 PUFF(S): 90 AEROSOL, METERED ORAL at 13:09

## 2020-04-19 RX ADMIN — ALBUTEROL 1 PUFF(S): 90 AEROSOL, METERED ORAL at 22:17

## 2020-04-19 RX ADMIN — CARVEDILOL PHOSPHATE 25 MILLIGRAM(S): 80 CAPSULE, EXTENDED RELEASE ORAL at 06:35

## 2020-04-19 RX ADMIN — PIPERACILLIN AND TAZOBACTAM 25 GRAM(S): 4; .5 INJECTION, POWDER, LYOPHILIZED, FOR SOLUTION INTRAVENOUS at 08:34

## 2020-04-19 RX ADMIN — INSULIN GLARGINE 15 UNIT(S): 100 INJECTION, SOLUTION SUBCUTANEOUS at 22:16

## 2020-04-19 RX ADMIN — ENOXAPARIN SODIUM 80 MILLIGRAM(S): 100 INJECTION SUBCUTANEOUS at 18:50

## 2020-04-19 RX ADMIN — ENOXAPARIN SODIUM 80 MILLIGRAM(S): 100 INJECTION SUBCUTANEOUS at 06:33

## 2020-04-19 RX ADMIN — ALBUTEROL 1 PUFF(S): 90 AEROSOL, METERED ORAL at 03:12

## 2020-04-19 RX ADMIN — Medication 40 MILLIGRAM(S): at 18:37

## 2020-04-19 RX ADMIN — ALBUTEROL 1 PUFF(S): 90 AEROSOL, METERED ORAL at 18:37

## 2020-04-19 RX ADMIN — Medication 81 MILLIGRAM(S): at 13:09

## 2020-04-19 RX ADMIN — MORPHINE SULFATE 1 MILLIGRAM(S): 50 CAPSULE, EXTENDED RELEASE ORAL at 03:08

## 2020-04-19 RX ADMIN — Medication 250 MILLIGRAM(S): at 06:32

## 2020-04-19 RX ADMIN — PIPERACILLIN AND TAZOBACTAM 25 GRAM(S): 4; .5 INJECTION, POWDER, LYOPHILIZED, FOR SOLUTION INTRAVENOUS at 18:37

## 2020-04-19 RX ADMIN — ATORVASTATIN CALCIUM 20 MILLIGRAM(S): 80 TABLET, FILM COATED ORAL at 22:19

## 2020-04-19 RX ADMIN — ALBUTEROL 1 PUFF(S): 90 AEROSOL, METERED ORAL at 11:02

## 2020-04-19 RX ADMIN — Medication 1: at 13:05

## 2020-04-19 RX ADMIN — Medication 6 MILLIGRAM(S): at 22:24

## 2020-04-19 RX ADMIN — CARVEDILOL PHOSPHATE 25 MILLIGRAM(S): 80 CAPSULE, EXTENDED RELEASE ORAL at 18:36

## 2020-04-19 RX ADMIN — ALBUTEROL 1 PUFF(S): 90 AEROSOL, METERED ORAL at 06:33

## 2020-04-19 NOTE — PROGRESS NOTE ADULT - ASSESSMENT
80 y/o male DNR/DNI w/ hx HTN, hyperlipidemia, CAD s/p CABG admitted for COVID19 with acute hypoxic respiratory failure and viral pneumonia. 78 y/o male w/ hx HTN, hyperlipidemia, CAD s/p CABG admitted for COVID19 with acute hypoxic respiratory failure and viral pneumonia. Patient was clinically improving but despite O2 sat 100% NRB , afebrile, and stable BPs patient has more work of breathing. He resended his DNR/DNI and now wants to be intubated. SPoke to wife and she says to obey his wishes. 78 y/o male w/ hx HTN, hyperlipidemia, CAD s/p CABG admitted for COVID19 with acute hypoxic respiratory failure and viral pneumonia. He is s/p hydroxychlorquine, anakindra. He has also been on Solumedrol, Lasix and zosyn/vanco. Patient was clinically improving on treatment but despite O2 sat 100% NRB , afebrile, and stable BPs, patient' s work of breathing has worsened. There was also concern for acute PE so he was also started on therapeutic lovenox without a CTPA. He resended his DNR/DNI and now wants to be intubated. Spoke to wife and she says to obey his wishes.

## 2020-04-19 NOTE — PROGRESS NOTE ADULT - SUBJECTIVE AND OBJECTIVE BOX
Patient is a 79y old  Male who presents with a chief complaint of covid r/o (18 Apr 2020 14:15)      SUBJECTIVE / OVERNIGHT EVENTS:    MEDICATIONS  (STANDING):  ALBUTerol    90 MICROgram(s) HFA Inhaler 1 Puff(s) Inhalation every 4 hours  aspirin  chewable 81 milliGRAM(s) Oral daily  atorvastatin 20 milliGRAM(s) Oral at bedtime  carvedilol 25 milliGRAM(s) Oral every 12 hours  dextrose 5%. 1000 milliLiter(s) (50 mL/Hr) IV Continuous <Continuous>  dextrose 5%. 1000 milliLiter(s) (50 mL/Hr) IV Continuous <Continuous>  dextrose 50% Injectable 12.5 Gram(s) IV Push once  dextrose 50% Injectable 25 Gram(s) IV Push once  dextrose 50% Injectable 25 Gram(s) IV Push once  dextrose 50% Injectable 12.5 Gram(s) IV Push once  dextrose 50% Injectable 25 Gram(s) IV Push once  dextrose 50% Injectable 25 Gram(s) IV Push once  enoxaparin Injectable 80 milliGRAM(s) SubCutaneous two times a day  furosemide   Injectable 40 milliGRAM(s) IV Push every 12 hours  insulin glargine Injectable (LANTUS) 15 Unit(s) SubCutaneous at bedtime  insulin lispro (HumaLOG) corrective regimen sliding scale   SubCutaneous three times a day before meals  melatonin 6 milliGRAM(s) Oral at bedtime  methylPREDNISolone sodium succinate Injectable 40 milliGRAM(s) IV Push every 12 hours  piperacillin/tazobactam IVPB.. 3.375 Gram(s) IV Intermittent every 8 hours  vancomycin  IVPB 1000 milliGRAM(s) IV Intermittent every 12 hours    MEDICATIONS  (PRN):  acetaminophen   Tablet .. 650 milliGRAM(s) Oral every 4 hours PRN Temp greater or equal to 38.5C (101.3F)  benzocaine 20% Spray 1 Spray(s) Topical three times a day PRN sore throat  dextrose 40% Gel 15 Gram(s) Oral once PRN Blood Glucose LESS THAN 70 milliGRAM(s)/deciLiter  dextrose 40% Gel 15 Gram(s) Oral once PRN Blood Glucose LESS THAN 70 milliGRAM(s)/deciliter  glucagon  Injectable 1 milliGRAM(s) IntraMuscular once PRN Glucose <70 milliGRAM(s)/deciLiter  glucagon  Injectable 1 milliGRAM(s) IntraMuscular once PRN Glucose LESS THAN 70 milligrams/deciliter      Vital Signs Last 24 Hrs  T(C): 37.1 (19 Apr 2020 05:55), Max: 37.1 (19 Apr 2020 05:55)  T(F): 98.8 (19 Apr 2020 05:55), Max: 98.8 (19 Apr 2020 05:55)  HR: 63 (19 Apr 2020 05:55) (63 - 78)  BP: 115/52 (19 Apr 2020 05:55) (90/51 - 129/56)  BP(mean): --  RR: 22 (19 Apr 2020 05:55) (20 - 32)  SpO2: 81% (19 Apr 2020 05:55) (69% - 92%)  CAPILLARY BLOOD GLUCOSE      POCT Blood Glucose.: 162 mg/dL (19 Apr 2020 07:31)  POCT Blood Glucose.: 146 mg/dL (18 Apr 2020 23:47)  POCT Blood Glucose.: 157 mg/dL (18 Apr 2020 22:25)  POCT Blood Glucose.: 183 mg/dL (18 Apr 2020 18:48)  POCT Blood Glucose.: 194 mg/dL (18 Apr 2020 17:07)  POCT Blood Glucose.: 145 mg/dL (18 Apr 2020 12:33)  POCT Blood Glucose.: 192 mg/dL (18 Apr 2020 09:33)    I&O's Summary    18 Apr 2020 07:01  -  19 Apr 2020 07:00  --------------------------------------------------------  IN: 0 mL / OUT: 600 mL / NET: -600 mL        PHYSICAL EXAM:  GENERAL: NAD, well-developed  HEAD:  Atraumatic, Normocephalic  EYES: EOMI, PERRLA, conjunctiva and sclera clear  NECK: Supple, No JVD  CHEST/LUNG: Clear to auscultation bilaterally; No wheeze  HEART: Regular rate and rhythm; No murmurs, rubs, or gallops  ABDOMEN: Soft, Nontender, Nondistended; Bowel sounds present  EXTREMITIES:  2+ Peripheral Pulses, No clubbing, cyanosis, or edema  PSYCH: AAOx3  NEUROLOGY: non-focal  SKIN: No rashes or lesions    LABS:                        16.4   14.44 )-----------( 115      ( 19 Apr 2020 07:12 )             50.1     04-18    135  |  90<L>  |  38<H>  ----------------------------<  234<H>  4.2   |  26  |  0.81    Ca    8.1<L>      18 Apr 2020 07:51    TPro  7.1  /  Alb  2.0<L>  /  TBili  0.8  /  DBili  x   /  AST  31  /  ALT  14  /  AlkPhos  81  04-18              RADIOLOGY & ADDITIONAL TESTS:    Imaging Personally Reviewed:    Consultant(s) Notes Reviewed:      Care Discussed with Consultants/Other Providers: Patient is a 79y old  Male who presents with a chief complaint of covid r/o (18 Apr 2020 14:15)      SUBJECTIVE / OVERNIGHT EVENTS:  Patient still very SOB. He says" Do what you have to do so I can breath again. I want a ventilator now". Denies cp, abdominal pain, N/V/D     MEDICATIONS  (STANDING):  ALBUTerol    90 MICROgram(s) HFA Inhaler 1 Puff(s) Inhalation every 4 hours  aspirin  chewable 81 milliGRAM(s) Oral daily  atorvastatin 20 milliGRAM(s) Oral at bedtime  carvedilol 25 milliGRAM(s) Oral every 12 hours  dextrose 5%. 1000 milliLiter(s) (50 mL/Hr) IV Continuous <Continuous>  dextrose 5%. 1000 milliLiter(s) (50 mL/Hr) IV Continuous <Continuous>  dextrose 50% Injectable 12.5 Gram(s) IV Push once  dextrose 50% Injectable 25 Gram(s) IV Push once  dextrose 50% Injectable 25 Gram(s) IV Push once  dextrose 50% Injectable 12.5 Gram(s) IV Push once  dextrose 50% Injectable 25 Gram(s) IV Push once  dextrose 50% Injectable 25 Gram(s) IV Push once  enoxaparin Injectable 80 milliGRAM(s) SubCutaneous two times a day  furosemide   Injectable 40 milliGRAM(s) IV Push every 12 hours  insulin glargine Injectable (LANTUS) 15 Unit(s) SubCutaneous at bedtime  insulin lispro (HumaLOG) corrective regimen sliding scale   SubCutaneous three times a day before meals  melatonin 6 milliGRAM(s) Oral at bedtime  methylPREDNISolone sodium succinate Injectable 40 milliGRAM(s) IV Push every 12 hours  piperacillin/tazobactam IVPB.. 3.375 Gram(s) IV Intermittent every 8 hours  vancomycin  IVPB 1000 milliGRAM(s) IV Intermittent every 12 hours    MEDICATIONS  (PRN):  acetaminophen   Tablet .. 650 milliGRAM(s) Oral every 4 hours PRN Temp greater or equal to 38.5C (101.3F)  benzocaine 20% Spray 1 Spray(s) Topical three times a day PRN sore throat  dextrose 40% Gel 15 Gram(s) Oral once PRN Blood Glucose LESS THAN 70 milliGRAM(s)/deciLiter  dextrose 40% Gel 15 Gram(s) Oral once PRN Blood Glucose LESS THAN 70 milliGRAM(s)/deciliter  glucagon  Injectable 1 milliGRAM(s) IntraMuscular once PRN Glucose <70 milliGRAM(s)/deciLiter  glucagon  Injectable 1 milliGRAM(s) IntraMuscular once PRN Glucose LESS THAN 70 milligrams/deciliter      Vital Signs Last 24 Hrs  T(C): 37.1 (19 Apr 2020 05:55), Max: 37.1 (19 Apr 2020 05:55)  T(F): 98.8 (19 Apr 2020 05:55), Max: 98.8 (19 Apr 2020 05:55)  HR: 63 (19 Apr 2020 05:55) (63 - 78)  BP: 115/52 (19 Apr 2020 05:55) (90/51 - 129/56)  BP(mean): --  RR: 22 (19 Apr 2020 05:55) (20 - 32)  SpO2: 81% (19 Apr 2020 05:55) (69% - 92%)  CAPILLARY BLOOD GLUCOSE      POCT Blood Glucose.: 162 mg/dL (19 Apr 2020 07:31)  POCT Blood Glucose.: 146 mg/dL (18 Apr 2020 23:47)  POCT Blood Glucose.: 157 mg/dL (18 Apr 2020 22:25)  POCT Blood Glucose.: 183 mg/dL (18 Apr 2020 18:48)  POCT Blood Glucose.: 194 mg/dL (18 Apr 2020 17:07)  POCT Blood Glucose.: 145 mg/dL (18 Apr 2020 12:33)  POCT Blood Glucose.: 192 mg/dL (18 Apr 2020 09:33)    I&O's Summary    18 Apr 2020 07:01  -  19 Apr 2020 07:00  --------------------------------------------------------  IN: 0 mL / OUT: 600 mL / NET: -600 mL        PHYSICAL EXAM:  GENERAL: NAD, well-developed  HEAD:  Atraumatic, Normocephalic  EYES: EOMI, PERRLA, conjunctiva and sclera clear  NECK: Supple, No JVD  CHEST/LUNG: decreased BS bilaterally; No wheeze  HEART: Regular rate and rhythm; No murmurs, rubs, or gallops  ABDOMEN: Soft, Nontender, Nondistended; Bowel sounds present  EXTREMITIES:  2+ Peripheral Pulses, No clubbing, cyanosis, or edema  PSYCH: AAOx3  NEUROLOGY: non-focal  SKIN: No rashes or lesions    LABS:                        16.4   14.44 )-----------( 115      ( 19 Apr 2020 07:12 )             50.1     04-18    135  |  90<L>  |  38<H>  ----------------------------<  234<H>  4.2   |  26  |  0.81    Ca    8.1<L>      18 Apr 2020 07:51    TPro  7.1  /  Alb  2.0<L>  /  TBili  0.8  /  DBili  x   /  AST  31  /  ALT  14  /  AlkPhos  81  04-18              RADIOLOGY & ADDITIONAL TESTS:    Imaging Personally Reviewed:      Consultant(s) Notes Reviewed:      Care Discussed with Consultants/Other Providers:

## 2020-04-19 NOTE — CONSULT NOTE ADULT - ATTENDING COMMENTS
Patient is seen and examined.   78 yo with multiple comorbidities admitted with hypoxemic respiratory failure in the setting of COVID infection. S/p Plaquenil and Anakinra. On my examination, patient is breathing comfortably, saturating 98% on NRB, sitting upright and speaking in full sentences.   Not MICU candidate at this time. Please re-consult as needed.

## 2020-04-19 NOTE — PROGRESS NOTE ADULT - PROBLEM SELECTOR PLAN 2
C/w anakinra  Completed 5 days of Plaquenil  s/p Solumedrol   c/w zosyn/Vanco  c/w anakinra  Called Blood bank to give plasma for COVID 19 @ [phone] and they will call back in 24 hours to tell me if he qualifies. C/w anakinra  Completed 5 days of Plaquenil  s/p Solumedrol   c/w zosyn/Vanco  c/w anakinra  Called con[facility]escent plasma trial on 4/18 to give plasma for COVID 19 @ [phone] and they will call back in 24 hours to tell me if he qualifies.

## 2020-04-19 NOTE — PROGRESS NOTE ADULT - SUBJECTIVE AND OBJECTIVE BOX
For a Vancomycin level (trough) of 21.2, tonight's dose was held and the Vancomycin dose was changed to 750 mg q 12 h.  A repeat Trough was ordered to be drawn prior to the AM dose.  This was discussed with the Hospitalist in charge.

## 2020-04-19 NOTE — CONSULT NOTE ADULT - SUBJECTIVE AND OBJECTIVE BOX
CHIEF COMPLAINT: shortness of breath     HPI: 80 y/o male pmh htn, hld, CAD s/p CABG c/o fever and cough x1 week. Pt admits to dry cough, subjective fever and sob over the last 1 week. Pt admits to worsening sob x2 days. Denies recent travel or sick contacts. Denies chest pain, palpitations, n/v/d, numbness, tingling, dizziness or syncope.    PAST MEDICAL & SURGICAL HISTORY:  Hypercholesterolemia  Old MI (myocardial infarction): 1998  S/P CABG x 3: in 1998  CAD (coronary artery disease)  Essential hypertension  Prediabetes  S/P CABG x 3: 1998  History of appendectomy      FAMILY HISTORY:  No pertinent family history in first degree relatives      SOCIAL HISTORY:  Smoking: [x ] Never Smoked [ ] Former Smoker (__ packs x ___ years) [ ] Current Smoker  (__ packs x ___ years)  Substance Use: [x ] Never Used [ ] Used ____  EtOH Use:  Marital Status: [ ] Single [x ]  [ ]  [ ]   Sexual History:   Occupation:  Recent Travel:  Country of Birth:  Advance Directives:    Allergies    No Known Allergies    Intolerances        HOME MEDICATIONS:  Home Medications:  aspirin 81 mg oral tablet, chewable: 1 tab(s) orally once a day (01 Apr 2020 17:15)  atorvastatin 20 mg oral tablet: 1 tab(s) orally once a day (01 Apr 2020 17:15)  carvedilol 25 mg oral tablet: 1 tab(s) orally 2 times a day (01 Apr 2020 17:15)  isosorbide mononitrate 30 mg oral tablet, extended release: 1 tab(s) orally once a day (in the morning) (01 Apr 2020 17:15)  lisinopril 5 mg oral tablet: 1 tab(s) orally once a day (01 Apr 2020 17:15)  metFORMIN 500 mg oral tablet, extended release: 500 milligram(s) orally 2 times a day (01 Apr 2020 17:15)      REVIEW OF SYSTEMS:  Constitutional: [ ] negative [ ] fevers [ ] chills [ ] weight loss [ ] weight gain  HEENT: [ ] negative [ ] dry eyes [ ] eye irritation [ ] postnasal drip [ ] nasal congestion  CV: [ ] negative  [ ] chest pain [ ] orthopnea [ ] palpitations [ ] murmur  Resp: [ ] negative [ ] cough [ ] shortness of breath [ ] dyspnea [ ] wheezing [ ] sputum [ ] hemoptysis  GI: [ ] negative [ ] nausea [ ] vomiting [ ] diarrhea [ ] constipation [ ] abd pain [ ] dysphagia   : [ ] negative [ ] dysuria [ ] nocturia [ ] hematuria [ ] increased urinary frequency  Musculoskeletal: [ ] negative [ ] back pain [ ] myalgias [ ] arthralgias [ ] fracture  Skin: [ ] negative [ ] rash [ ] itch  Neurological: [ ] negative [ ] headache [ ] dizziness [ ] syncope [ ] weakness [ ] numbness  Psychiatric: [ ] negative [ ] anxiety [ ] depression  Endocrine: [ ] negative [ ] diabetes [ ] thyroid problem  Hematologic/Lymphatic: [ ] negative [ ] anemia [ ] bleeding problem  Allergic/Immunologic: [ ] negative [ ] itchy eyes [ ] nasal discharge [ ] hives [ ] angioedema  [ ] All other systems negative  [ ] Unable to assess ROS because ________    OBJECTIVE:  ICU Vital Signs Last 24 Hrs  T(C): 36.2 (19 Apr 2020 09:26), Max: 37.1 (19 Apr 2020 05:55)  T(F): 97.2 (19 Apr 2020 09:26), Max: 98.8 (19 Apr 2020 05:55)  HR: 75 (19 Apr 2020 09:26) (63 - 75)  BP: 100/50 (19 Apr 2020 09:26) (100/50 - 129/56)  BP(mean): --  ABP: --  ABP(mean): --  RR: 43 (19 Apr 2020 09:26) (20 - 43)  SpO2: 100% (19 Apr 2020 09:26) (69% - 100%)        04-18 @ 07:01  -  04-19 @ 07:00  --------------------------------------------------------  IN: 0 mL / OUT: 600 mL / NET: -600 mL      CAPILLARY BLOOD GLUCOSE      POCT Blood Glucose.: 159 mg/dL (19 Apr 2020 12:23)    PHYSICAL EXAM:    General: tachypneic on NRB  HEENT: NCAT. No scleral icterus or injection. Dry MM.   Neck: Supple.  Full ROM.  No JVD.  No thyromegaly. No lymphadenopathy.   Abdomen: soft, NT  Skin: Warm and dry.  No rashes.  Extremities: No edema, clubbing, or cyanosis.    Musculoskeletal: No deformities.    Neuro: A&Ox3.      LINES:     HOSPITAL MEDICATIONS:  Standing Meds:  ALBUTerol    90 MICROgram(s) HFA Inhaler 1 Puff(s) Inhalation every 4 hours  aspirin  chewable 81 milliGRAM(s) Oral daily  atorvastatin 20 milliGRAM(s) Oral at bedtime  carvedilol 25 milliGRAM(s) Oral every 12 hours  dextrose 5%. 1000 milliLiter(s) IV Continuous <Continuous>  dextrose 5%. 1000 milliLiter(s) IV Continuous <Continuous>  dextrose 50% Injectable 12.5 Gram(s) IV Push once  dextrose 50% Injectable 25 Gram(s) IV Push once  dextrose 50% Injectable 25 Gram(s) IV Push once  dextrose 50% Injectable 12.5 Gram(s) IV Push once  dextrose 50% Injectable 25 Gram(s) IV Push once  dextrose 50% Injectable 25 Gram(s) IV Push once  enoxaparin Injectable 80 milliGRAM(s) SubCutaneous two times a day  furosemide   Injectable 40 milliGRAM(s) IV Push every 12 hours  furosemide   Injectable 40 milliGRAM(s) IV Push once  insulin glargine Injectable (LANTUS) 15 Unit(s) SubCutaneous at bedtime  insulin lispro (HumaLOG) corrective regimen sliding scale   SubCutaneous three times a day before meals  melatonin 6 milliGRAM(s) Oral at bedtime  methylPREDNISolone sodium succinate Injectable 40 milliGRAM(s) IV Push every 12 hours  piperacillin/tazobactam IVPB.. 3.375 Gram(s) IV Intermittent every 8 hours  vancomycin  IVPB 1000 milliGRAM(s) IV Intermittent every 12 hours      PRN Meds:  acetaminophen   Tablet .. 650 milliGRAM(s) Oral every 4 hours PRN  benzocaine 20% Spray 1 Spray(s) Topical three times a day PRN  dextrose 40% Gel 15 Gram(s) Oral once PRN  dextrose 40% Gel 15 Gram(s) Oral once PRN  glucagon  Injectable 1 milliGRAM(s) IntraMuscular once PRN  glucagon  Injectable 1 milliGRAM(s) IntraMuscular once PRN      LABS:                        16.4   14.44 )-----------( 115      ( 19 Apr 2020 07:12 )             50.1     Hgb Trend: 16.4<--, 17.1<--, 16.2<--, 16.2<--, 16.7<--  04-19    137  |  90<L>  |  37<H>  ----------------------------<  151<H>  4.2   |  31  |  0.91    Ca    8.5      19 Apr 2020 07:12    TPro  7.4  /  Alb  2.4<L>  /  TBili  0.9  /  DBili  x   /  AST  29  /  ALT  14  /  AlkPhos  85  04-19    Creatinine Trend: 0.91<--, 0.81<--, 0.74<--, 0.63<--, 0.69<--, 0.57<--      Arterial Blood Gas:  04-19 @ 11:54  7.41/65/36/34/61.8/14.3  ABG lactate: --        MICROBIOLOGY:       RADIOLOGY:  [ ] Reviewed and interpreted by me    EKG: CHIEF COMPLAINT: shortness of breath     HPI: 78 y/o male pmh htn, hld, CAD s/p CABG c/o fever and cough x1 week. Pt admits to dry cough, subjective fever and sob over the last 1 week. Pt admits to worsening sob x2 days. Denies recent travel or sick contacts. Denies chest pain, palpitations, n/v/d, numbness, tingling, dizziness or syncope.    Patient found to be COVID positive, s/p plaquenil and anakinra. Patient started on solumedrol, IV lasix, and broad spectrum antibiotics. Patient also with elevated ddimer, started on lovenox for possible PE.    MICU consulted for worsening shortness of breath.       PAST MEDICAL & SURGICAL HISTORY:  Hypercholesterolemia  Old MI (myocardial infarction): 1998  S/P CABG x 3: in 1998  CAD (coronary artery disease)  Essential hypertension  Prediabetes  S/P CABG x 3: 1998  History of appendectomy      FAMILY HISTORY:  No pertinent family history in first degree relatives      SOCIAL HISTORY:  Smoking: [x ] Never Smoked [ ] Former Smoker (__ packs x ___ years) [ ] Current Smoker  (__ packs x ___ years)  Substance Use: [x ] Never Used [ ] Used ____  EtOH Use:  Marital Status: [ ] Single [x ]  [ ]  [ ]   Sexual History:   Occupation:  Recent Travel:  Country of Birth:  Advance Directives:    Allergies    No Known Allergies    Intolerances        HOME MEDICATIONS:  Home Medications:  aspirin 81 mg oral tablet, chewable: 1 tab(s) orally once a day (01 Apr 2020 17:15)  atorvastatin 20 mg oral tablet: 1 tab(s) orally once a day (01 Apr 2020 17:15)  carvedilol 25 mg oral tablet: 1 tab(s) orally 2 times a day (01 Apr 2020 17:15)  isosorbide mononitrate 30 mg oral tablet, extended release: 1 tab(s) orally once a day (in the morning) (01 Apr 2020 17:15)  lisinopril 5 mg oral tablet: 1 tab(s) orally once a day (01 Apr 2020 17:15)  metFORMIN 500 mg oral tablet, extended release: 500 milligram(s) orally 2 times a day (01 Apr 2020 17:15)      REVIEW OF SYSTEMS:  Constitutional: [ ] negative [ ] fevers [ ] chills [ ] weight loss [ ] weight gain  HEENT: [ ] negative [ ] dry eyes [ ] eye irritation [ ] postnasal drip [ ] nasal congestion  CV: [ ] negative  [ ] chest pain [ ] orthopnea [ ] palpitations [ ] murmur  Resp: [ ] negative [ ] cough [ ] shortness of breath [ ] dyspnea [ ] wheezing [ ] sputum [ ] hemoptysis  GI: [ ] negative [ ] nausea [ ] vomiting [ ] diarrhea [ ] constipation [ ] abd pain [ ] dysphagia   : [ ] negative [ ] dysuria [ ] nocturia [ ] hematuria [ ] increased urinary frequency  Musculoskeletal: [ ] negative [ ] back pain [ ] myalgias [ ] arthralgias [ ] fracture  Skin: [ ] negative [ ] rash [ ] itch  Neurological: [ ] negative [ ] headache [ ] dizziness [ ] syncope [ ] weakness [ ] numbness  Psychiatric: [ ] negative [ ] anxiety [ ] depression  Endocrine: [ ] negative [ ] diabetes [ ] thyroid problem  Hematologic/Lymphatic: [ ] negative [ ] anemia [ ] bleeding problem  Allergic/Immunologic: [ ] negative [ ] itchy eyes [ ] nasal discharge [ ] hives [ ] angioedema  [ ] All other systems negative  [ ] Unable to assess ROS because ________    OBJECTIVE:  ICU Vital Signs Last 24 Hrs  T(C): 36.2 (19 Apr 2020 09:26), Max: 37.1 (19 Apr 2020 05:55)  T(F): 97.2 (19 Apr 2020 09:26), Max: 98.8 (19 Apr 2020 05:55)  HR: 75 (19 Apr 2020 09:26) (63 - 75)  BP: 100/50 (19 Apr 2020 09:26) (100/50 - 129/56)  BP(mean): --  ABP: --  ABP(mean): --  RR: 43 (19 Apr 2020 09:26) (20 - 43)  SpO2: 100% (19 Apr 2020 09:26) (69% - 100%)        04-18 @ 07:01  -  04-19 @ 07:00  --------------------------------------------------------  IN: 0 mL / OUT: 600 mL / NET: -600 mL      CAPILLARY BLOOD GLUCOSE      POCT Blood Glucose.: 159 mg/dL (19 Apr 2020 12:23)    PHYSICAL EXAM:    General: tachypneic on NRB  HEENT: NCAT. No scleral icterus or injection. Dry MM.   Neck: Supple.  Full ROM.  No JVD.  No thyromegaly. No lymphadenopathy.   Abdomen: soft, NT  Skin: Warm and dry.  No rashes.  Extremities: No edema, clubbing, or cyanosis.    Musculoskeletal: No deformities.    Neuro: A&Ox3.      LINES:     HOSPITAL MEDICATIONS:  Standing Meds:  ALBUTerol    90 MICROgram(s) HFA Inhaler 1 Puff(s) Inhalation every 4 hours  aspirin  chewable 81 milliGRAM(s) Oral daily  atorvastatin 20 milliGRAM(s) Oral at bedtime  carvedilol 25 milliGRAM(s) Oral every 12 hours  dextrose 5%. 1000 milliLiter(s) IV Continuous <Continuous>  dextrose 5%. 1000 milliLiter(s) IV Continuous <Continuous>  dextrose 50% Injectable 12.5 Gram(s) IV Push once  dextrose 50% Injectable 25 Gram(s) IV Push once  dextrose 50% Injectable 25 Gram(s) IV Push once  dextrose 50% Injectable 12.5 Gram(s) IV Push once  dextrose 50% Injectable 25 Gram(s) IV Push once  dextrose 50% Injectable 25 Gram(s) IV Push once  enoxaparin Injectable 80 milliGRAM(s) SubCutaneous two times a day  furosemide   Injectable 40 milliGRAM(s) IV Push every 12 hours  furosemide   Injectable 40 milliGRAM(s) IV Push once  insulin glargine Injectable (LANTUS) 15 Unit(s) SubCutaneous at bedtime  insulin lispro (HumaLOG) corrective regimen sliding scale   SubCutaneous three times a day before meals  melatonin 6 milliGRAM(s) Oral at bedtime  methylPREDNISolone sodium succinate Injectable 40 milliGRAM(s) IV Push every 12 hours  piperacillin/tazobactam IVPB.. 3.375 Gram(s) IV Intermittent every 8 hours  vancomycin  IVPB 1000 milliGRAM(s) IV Intermittent every 12 hours      PRN Meds:  acetaminophen   Tablet .. 650 milliGRAM(s) Oral every 4 hours PRN  benzocaine 20% Spray 1 Spray(s) Topical three times a day PRN  dextrose 40% Gel 15 Gram(s) Oral once PRN  dextrose 40% Gel 15 Gram(s) Oral once PRN  glucagon  Injectable 1 milliGRAM(s) IntraMuscular once PRN  glucagon  Injectable 1 milliGRAM(s) IntraMuscular once PRN      LABS:                        16.4   14.44 )-----------( 115      ( 19 Apr 2020 07:12 )             50.1     Hgb Trend: 16.4<--, 17.1<--, 16.2<--, 16.2<--, 16.7<--  04-19    137  |  90<L>  |  37<H>  ----------------------------<  151<H>  4.2   |  31  |  0.91    Ca    8.5      19 Apr 2020 07:12    TPro  7.4  /  Alb  2.4<L>  /  TBili  0.9  /  DBili  x   /  AST  29  /  ALT  14  /  AlkPhos  85  04-19    Creatinine Trend: 0.91<--, 0.81<--, 0.74<--, 0.63<--, 0.69<--, 0.57<--      Arterial Blood Gas:  04-19 @ 11:54  7.41/65/36/34/61.8/14.3  ABG lactate: --        MICROBIOLOGY:       RADIOLOGY:  [ ] Reviewed and interpreted by me    EKG:

## 2020-04-19 NOTE — CONSULT NOTE ADULT - ASSESSMENT
79 M w/ hx HTN, hyperlipidemia, CAD s/p CABG admitted for COVID19 with acute hypoxic respiratory failure and viral pneumonia in the setting of COVID, s/p hydroxychlorquine, anakinra. Patient currently on solumedrol, lasix, vanc, and zosyn.  He has also been on Solumedrol, Lasix and zosyn/vanco.    # Respiratory distress  - patient in worsening respiratory distress in the setting of COVID vs PE  - recent CXR on 4/18 shows mildly improved opacities compared to CXR on 4/16  - currently satting 100% on NRB  - agree with lovenox given concern for PE  - continue solumedrol, IV lasix as tolerated  - repeat ABG if patient desats   - continue goals of care, patient unsure if he wants to be intubated or not. Unable to reach family for further discussion.     *INCOMPLETE    Aline Juárez, PGY2  Internal Medicine 79 M w/ hx HTN, hyperlipidemia, CAD s/p CABG admitted for COVID19 with acute hypoxic respiratory failure and viral pneumonia in the setting of COVID, s/p hydroxychlorquine, anakinra. Patient currently on solumedrol, lasix, vanc, and zosyn.  He has also been on Solumedrol, Lasix and zosyn/vanco.    # Respiratory distress  - patient in worsening respiratory distress in the setting of COVID vs PE  - recent CXR on 4/18 shows mildly improved opacities compared to CXR on 4/16  - currently satting 100% on NRB  - agree with lovenox given concern for PE  - continue solumedrol, IV lasix as tolerated  - repeat ABG if patient desats   - GOC: Patient unsure if he wants to be intubated or not at this time and asked for team to speak to his wife. Discussed with patient's wife Glenny who states that patient had decided that he wanted DNR/DNI a few days ago, which she agreed with. She is unsure what caused him to change his mind to rescind the DNR/DNI. She would not want him to be intubated if chances are he will not be able to come off the ventilator. However, she would like to think about this tonight, discuss with the rest of the family, and make final decision regarding intubation.    *INCOMPLETE    Aline Juárez, PGY2  Internal Medicine 79 M w/ hx HTN, hyperlipidemia, CAD s/p CABG admitted for COVID19 with acute hypoxic respiratory failure and viral pneumonia in the setting of COVID, s/p hydroxychlorquine, anakinra. Patient currently on solumedrol, lasix, vanc, and zosyn.  He has also been on Solumedrol, Lasix and zosyn/vanco.    # Respiratory distress  - patient in worsening respiratory distress in the setting of COVID vs PE  - recent CXR on 4/18 shows mildly improved opacities compared to CXR on 4/16  - currently satting 100% on NRB  - agree with lovenox given concern for PE  - continue solumedrol, IV lasix as tolerated  - repeat ABG if patient desats   - GOC: Patient unsure if he wants to be intubated or not at this time and asked for team to speak to his wife. Discussed with patient's wife Glenny who states that patient had decided that he wanted DNR/DNI a few days ago, which she agreed with. She is unsure what caused him to change his mind to rescind the DNR/DNI. She would not want him to be intubated if chances are he will not be able to come off the ventilator. However, she would like to think about this tonight, discuss with the rest of the family, and make final decision regarding intubation.    Not a MICU candidate at this time. Please reconsult as needed.    Aline Juárez, PGY2  Internal Medicine

## 2020-04-19 NOTE — PROGRESS NOTE ADULT - PROBLEM SELECTOR PLAN 1
had clinical improvement after lasix , solumedrol, therapeutic Lovenox, and abx on 4/13.  Patient has been SOB x 48 hours but O2Sat 100% on NRB   -CXR 4/16 shows worsening b/l opacities  - completed 6 days of hydroxychloroquine, inflammatory markers trending down   - c/w  Anakinra   c/w Zosyn/vanco  c/w therapeutic Lovenox since may have a PE; Check CTPA when less hypoxic and able to lie flat.  c/w Lasix 40mg IV q12hr   Spoke to Pulmonary and patient not a candidate for tosculimab b/c already got anakinra and CRP only 12  -Called Blood bank to give plasma for COVID 19 @ [phone] and they will call back in 24 hours to tell me if he qualifies.   - Patient now DNR/DNI had clinical improvement after lasix , solumedrol, therapeutic Lovenox, and abx on 4/13.  Patient has been increasingly SOB x 72 hours but O2Sat 100% on NRB ; Patient looks tired with work of breathing.   -CXR 4/16 shows worsening b/l opacities  - completed 6 days of hydroxychloroquine, inflammatory markers trending down   - c/w  Anakinra   c/w Zosyn/vanco  c/w therapeutic Lovenox since may have a PE; Check CTPA when less hypoxic and able to lie flat.  c/w Lasix 40mg IV q12hr for SBP > 110; Lasix held due to lower BPs  Spoke to Pulmonary and patient not a candidate for tosculimab b/c already got anakinra and CRP only 12  -Called Blood bank to give plasma for COVID 19 @ [phone] and they will call back in 24 hours to tell me if he qualifies.   - Patient resended his  DNR/DNI; spoke to wife lashawn and wants Full Code.  -Will check ABG and if PaO2 < 60 will consult MICU for intubation.   -CXR on 4/18 performed and officiall results pending.   Prognosis guarded had clinical improvement after lasix , solumedrol, therapeutic Lovenox, and abx on 4/13.  Patient has been increasingly SOB x 72 hours but O2Sat 100% on NRB ; Patient looks tired with work of breathing.   -CXR 4/16 shows worsening b/l opacities  - completed 6 days of hydroxychloroquine, inflammatory markers trending down   - s/p  Anakinra   - c/w Zosyn/vanco  c/w therapeutic Lovenox since may have a PE; Check CTPA when less hypoxic and able to lie flat.  c/w Lasix 40mg IV q12hr for SBP > 110; Lasix held due to lower BPs  Spoke to Pulmonary and patient not a candidate for tosculimab b/c already got anakinra and CRP only 12  -Called Blood bank to give plasma for COVID 19 @ [phone] and they will call back in 24 hours to tell me if he qualifies.   - Patient resended his  DNR/DNI; spoke to wife lashawn and wants Full Code.  -Will check ABG and if PaO2 < 60 will consult MICU for intubation.   -CXR on 4/18 performed and officiall results pending.   Prognosis guarded

## 2020-04-20 LAB
ALBUMIN SERPL ELPH-MCNC: 2.2 G/DL — LOW (ref 3.3–5)
ALP SERPL-CCNC: 76 U/L — SIGNIFICANT CHANGE UP (ref 40–120)
ALT FLD-CCNC: 13 U/L — SIGNIFICANT CHANGE UP (ref 4–41)
ANION GAP SERPL CALC-SCNC: 13 MMO/L — SIGNIFICANT CHANGE UP (ref 7–14)
AST SERPL-CCNC: 35 U/L — SIGNIFICANT CHANGE UP (ref 4–40)
BILIRUB SERPL-MCNC: 0.9 MG/DL — SIGNIFICANT CHANGE UP (ref 0.2–1.2)
BUN SERPL-MCNC: 40 MG/DL — HIGH (ref 7–23)
CALCIUM SERPL-MCNC: 8.5 MG/DL — SIGNIFICANT CHANGE UP (ref 8.4–10.5)
CHLORIDE SERPL-SCNC: 89 MMOL/L — LOW (ref 98–107)
CO2 SERPL-SCNC: 34 MMOL/L — HIGH (ref 22–31)
CREAT SERPL-MCNC: 0.93 MG/DL — SIGNIFICANT CHANGE UP (ref 0.5–1.3)
GLUCOSE BLDC GLUCOMTR-MCNC: 114 MG/DL — HIGH (ref 70–99)
GLUCOSE BLDC GLUCOMTR-MCNC: 87 MG/DL — SIGNIFICANT CHANGE UP (ref 70–99)
GLUCOSE SERPL-MCNC: 96 MG/DL — SIGNIFICANT CHANGE UP (ref 70–99)
HCT VFR BLD CALC: 49 % — SIGNIFICANT CHANGE UP (ref 39–50)
HGB BLD-MCNC: 16.1 G/DL — SIGNIFICANT CHANGE UP (ref 13–17)
MCHC RBC-ENTMCNC: 30.7 PG — SIGNIFICANT CHANGE UP (ref 27–34)
MCHC RBC-ENTMCNC: 32.9 % — SIGNIFICANT CHANGE UP (ref 32–36)
MCV RBC AUTO: 93.3 FL — SIGNIFICANT CHANGE UP (ref 80–100)
NRBC # FLD: 0 K/UL — SIGNIFICANT CHANGE UP (ref 0–0)
PLATELET # BLD AUTO: 124 K/UL — LOW (ref 150–400)
PMV BLD: 10.8 FL — SIGNIFICANT CHANGE UP (ref 7–13)
POTASSIUM SERPL-MCNC: 3.9 MMOL/L — SIGNIFICANT CHANGE UP (ref 3.5–5.3)
POTASSIUM SERPL-SCNC: 3.9 MMOL/L — SIGNIFICANT CHANGE UP (ref 3.5–5.3)
PROT SERPL-MCNC: 7.5 G/DL — SIGNIFICANT CHANGE UP (ref 6–8.3)
RBC # BLD: 5.25 M/UL — SIGNIFICANT CHANGE UP (ref 4.2–5.8)
RBC # FLD: 12.6 % — SIGNIFICANT CHANGE UP (ref 10.3–14.5)
SODIUM SERPL-SCNC: 136 MMOL/L — SIGNIFICANT CHANGE UP (ref 135–145)
VANCOMYCIN TROUGH SERPL-MCNC: 18.1 UG/ML — SIGNIFICANT CHANGE UP (ref 10–20)
WBC # BLD: 13.26 K/UL — HIGH (ref 3.8–10.5)
WBC # FLD AUTO: 13.26 K/UL — HIGH (ref 3.8–10.5)

## 2020-04-20 PROCEDURE — 99498 ADVNCD CARE PLAN ADDL 30 MIN: CPT | Mod: 25

## 2020-04-20 PROCEDURE — 99233 SBSQ HOSP IP/OBS HIGH 50: CPT

## 2020-04-20 PROCEDURE — 99497 ADVNCD CARE PLAN 30 MIN: CPT | Mod: 25

## 2020-04-20 PROCEDURE — 99223 1ST HOSP IP/OBS HIGH 75: CPT

## 2020-04-20 RX ADMIN — ENOXAPARIN SODIUM 80 MILLIGRAM(S): 100 INJECTION SUBCUTANEOUS at 06:38

## 2020-04-20 RX ADMIN — Medication 40 MILLIGRAM(S): at 06:38

## 2020-04-20 RX ADMIN — ATORVASTATIN CALCIUM 20 MILLIGRAM(S): 80 TABLET, FILM COATED ORAL at 22:48

## 2020-04-20 RX ADMIN — CARVEDILOL PHOSPHATE 25 MILLIGRAM(S): 80 CAPSULE, EXTENDED RELEASE ORAL at 17:16

## 2020-04-20 RX ADMIN — PIPERACILLIN AND TAZOBACTAM 25 GRAM(S): 4; .5 INJECTION, POWDER, LYOPHILIZED, FOR SOLUTION INTRAVENOUS at 17:15

## 2020-04-20 RX ADMIN — PIPERACILLIN AND TAZOBACTAM 25 GRAM(S): 4; .5 INJECTION, POWDER, LYOPHILIZED, FOR SOLUTION INTRAVENOUS at 01:12

## 2020-04-20 RX ADMIN — ALBUTEROL 1 PUFF(S): 90 AEROSOL, METERED ORAL at 01:54

## 2020-04-20 RX ADMIN — Medication 81 MILLIGRAM(S): at 12:18

## 2020-04-20 RX ADMIN — Medication 250 MILLIGRAM(S): at 21:08

## 2020-04-20 RX ADMIN — PIPERACILLIN AND TAZOBACTAM 25 GRAM(S): 4; .5 INJECTION, POWDER, LYOPHILIZED, FOR SOLUTION INTRAVENOUS at 07:51

## 2020-04-20 RX ADMIN — ENOXAPARIN SODIUM 80 MILLIGRAM(S): 100 INJECTION SUBCUTANEOUS at 18:47

## 2020-04-20 RX ADMIN — ALBUTEROL 1 PUFF(S): 90 AEROSOL, METERED ORAL at 06:36

## 2020-04-20 RX ADMIN — Medication 250 MILLIGRAM(S): at 06:37

## 2020-04-20 RX ADMIN — Medication 6 MILLIGRAM(S): at 22:48

## 2020-04-20 NOTE — PROGRESS NOTE ADULT - PROBLEM SELECTOR PLAN 2
C/w anakinra  Completed 5 days of Plaquenil  s/p Solumedrol   c/w zosyn/Vanco  c/w anakinra  Called convalescent plasma trial on 4/18 to give plasma for COVID 19 @ [phone] and they will call back in 24 hours to tell me if he qualifies.

## 2020-04-20 NOTE — CONSULT NOTE ADULT - SUBJECTIVE AND OBJECTIVE BOX
HPI:  78 y/o male pmh htn, hld, CAD s/p CABG c/o fever and cough x1 week. Pt admits to dry cough, subjective fever and sob over the last 1 week. Pt admits to worsening sob x2 days. Denies recent travel or sick contacts. Denies chest pain, palpitations, n/v/d, numbness, tingling, dizziness or syncope. (01 Apr 2020 17:13)    PERTINENT PM/SXH:   Hypercholesterolemia  Old MI (myocardial infarction)  S/P CABG x 3  CAD (coronary artery disease)  Essential hypertension  Prediabetes    S/P CABG x 3  History of appendectomy    FAMILY HISTORY:  No pertinent family history in first degree relatives    ITEMS NOT CHECKED ARE NOT PRESENT    SOCIAL HISTORY:   Significant other/partner:  [ ]  Children:  [ ]  Jew/Spirituality:  Substance hx:  [ ]   Tobacco hx:  [ ]   Alcohol hx: [ ]   Home Opioid hx:  [ ] I-Stop Reference No:  Living Situation: [ ]Home  [ ]Long term care  [ ]Rehab [ ]Other    ADVANCE DIRECTIVES:    DNR  Yes  MOLST  [ ]  Living Will  [ ]   DECISION MAKER(s):  [ ] Health Care Proxy(s)  [ ] Surrogate(s)  [ ] Guardian           Name(s): Phone Number(s):    BASELINE (I)ADL(s) (prior to admission):  [place]: [ ]Total  [ ] Moderate [ ]Dependent    Allergies    No Known Allergies    Intolerances    MEDICATIONS  (STANDING):  ALBUTerol    90 MICROgram(s) HFA Inhaler 1 Puff(s) Inhalation every 4 hours  aspirin  chewable 81 milliGRAM(s) Oral daily  atorvastatin 20 milliGRAM(s) Oral at bedtime  carvedilol 25 milliGRAM(s) Oral every 12 hours  dextrose 5%. 1000 milliLiter(s) (50 mL/Hr) IV Continuous <Continuous>  dextrose 5%. 1000 milliLiter(s) (50 mL/Hr) IV Continuous <Continuous>  dextrose 50% Injectable 12.5 Gram(s) IV Push once  dextrose 50% Injectable 25 Gram(s) IV Push once  dextrose 50% Injectable 25 Gram(s) IV Push once  dextrose 50% Injectable 12.5 Gram(s) IV Push once  dextrose 50% Injectable 25 Gram(s) IV Push once  dextrose 50% Injectable 25 Gram(s) IV Push once  enoxaparin Injectable 80 milliGRAM(s) SubCutaneous two times a day  furosemide   Injectable 40 milliGRAM(s) IV Push once  furosemide   Injectable 40 milliGRAM(s) IV Push every 12 hours  insulin glargine Injectable (LANTUS) 15 Unit(s) SubCutaneous at bedtime  insulin lispro (HumaLOG) corrective regimen sliding scale   SubCutaneous three times a day before meals  melatonin 6 milliGRAM(s) Oral at bedtime  vancomycin  IVPB 750 milliGRAM(s) IV Intermittent every 12 hours    MEDICATIONS  (PRN):  acetaminophen   Tablet .. 650 milliGRAM(s) Oral every 4 hours PRN Temp greater or equal to 38.5C (101.3F)  benzocaine 20% Spray 1 Spray(s) Topical three times a day PRN sore throat  dextrose 40% Gel 15 Gram(s) Oral once PRN Blood Glucose LESS THAN 70 milliGRAM(s)/deciLiter  dextrose 40% Gel 15 Gram(s) Oral once PRN Blood Glucose LESS THAN 70 milliGRAM(s)/deciliter  dextrose 40% Gel 15 Gram(s) Oral once PRN Blood Glucose LESS THAN 70 milliGRAM(s)/deciLiter  dextrose 40% Gel 15 Gram(s) Oral once PRN Blood Glucose LESS THAN 70 milliGRAM(s)/deciliter  dextrose 40% Gel 15 Gram(s) Oral once PRN Blood Glucose LESS THAN 70 milliGRAM(s)/deciliter  glucagon  Injectable 1 milliGRAM(s) IntraMuscular once PRN Glucose <70 milliGRAM(s)/deciLiter  glucagon  Injectable 1 milliGRAM(s) IntraMuscular once PRN Glucose LESS THAN 70 milligrams/deciliter    PRESENT SYMPTOMS: [ ]Unable to obtain due to poor mentation   Source if other than patient:  [ ]Family   [ ]Team     Pain (Impact on QOL):    Location -         Minimal acceptable level (0-10 scale):                    Aggravating factors -  Quality -  Radiation -  Severity (0-10 scale) -    Timing -    PAIN AD Score:     [email or web address] (press ctrl +  left click to view)    Dyspnea:                           [ ]Mild [ ]Moderate [ ]Severe  Anxiety:                             [ ]Mild [ ]Moderate [ ]Severe  Fatigue:                             [ ]Mild [ ]Moderate [ ]Severe  Nausea:                             [ ]Mild [ ]Moderate [ ]Severe  Loss of appetite:              [ ]Mild [ ]Moderate [ ]Severe  Constipation:                    [ ]Mild [ ]Moderate [ ]Severe  Grief Present                    [ ] Yes   [ ] No   Other Symptoms:  [ ]All other review of systems negative     Karnofsky Performance Score/Palliative Performance Status Version 2:         %    http://palliative.info/resource_material/[email or web address].pdf  PHYSICAL EXAM:  Vital Signs Last 24 Hrs  T(C): 36.8 (20 Apr 2020 22:44), Max: 36.8 (20 Apr 2020 22:44)  T(F): 98.2 (20 Apr 2020 22:44), Max: 98.2 (20 Apr 2020 22:44)  HR: 62 (20 Apr 2020 22:44) (61 - 62)  BP: 119/58 (20 Apr 2020 22:44) (108/58 - 119/58)  BP(mean): --  RR: 32 (20 Apr 2020 22:44) (28 - 32)  SpO2: 100% (20 Apr 2020 22:44) (100% - 100%) I&O's Summary    20 Apr 2020 07:01  -  21 Apr 2020 07:00  --------------------------------------------------------  IN: 0 mL / OUT: 250 mL / NET: -250 mL    GENERAL:  [ ]Alert  [ ]Oriented x   [ ]Lethargic  [ ]Cachexia  [ ]Unarousable  [ ]Verbal  [ ]Non-Verbal  Behavioral:   [ ] Anxiety  [ ] Delirium [ ] Agitation [ ] Other  HEENT:  [ ]Normal   [ ]Dry mouth   [ ]ET Tube/Trach  [ ]Oral lesions  PULMONARY:   [ ]Clear [ ]Tachypnea  [ ]Audible excessive secretions   [ ]Rhonchi        [ ]Right [ ]Left [ ]Bilateral  [ ]Crackles        [ ]Right [ ]Left [ ]Bilateral  [ ]Wheezing     [ ]Right [ ]Left [ ]Bilateral  CARDIOVASCULAR:    [ ]Regular [ ]Irregular [ ]Tachy  [ ]Ray [ ]Murmur [ ]Other  GASTROINTESTINAL:  [ ]Soft  [ ]Distended   [ ]+BS  [ ]Non tender [ ]Tender  [ ]PEG [ ]OGT/ NGT  Last BM:   GENITOURINARY:  [ ]Normal [ ] Incontinent   [ ]Oliguria/Anuria   [ ]Peña  MUSCULOSKELETAL:   [ ]Normal   [ ]Weakness  [ ]Bed/Wheelchair bound [ ]Edema  NEUROLOGIC:   [ ]No focal deficits  [ ] Cognitive impairment  [ ] Dysphagia [ ]Dysarthria [ ] Paresis [ ]Other   SKIN:   [ ]Normal   [ ]Pressure ulcer(s)  [ ]Rash    CRITICAL CARE:  [ ] Shock Present  [ ]Septic [ ]Cardiogenic [ ]Neurologic [ ]Hypovolemic  [ ]  Vasopressors [ ]  Inotropes   [ ] Respiratory failure present  [ ] Acute  [ ] Chronic [ ] Hypoxic  [ ] Hypercarbic [ ] Other  [ ] Other organ failure     LABS:                        14.3   14.05 )-----------( 104      ( 21 Apr 2020 05:50 )             42.8   04-21    137  |  89<L>  |  41<H>  ----------------------------<  45<LL>  3.1<L>   |  34<H>  |  0.86    Ca    8.3<L>      21 Apr 2020 05:50    TPro  6.4  /  Alb  2.1<L>  /  TBili  0.9  /  DBili  x   /  AST  30  /  ALT  12  /  AlkPhos  77  04-21        RADIOLOGY & ADDITIONAL STUDIES:    PROTEIN CALORIE MALNUTRITION PRESENT: [ ] Yes [ ] No  [ ] PPSV2 < or = to 30% [ ] significant weight loss  [ ] poor nutritional intake [ ] catabolic state [ ] anasarca     Albumin, Serum: 2.1 g/dL (04-21-20 @ 05:50)  Artificial Nutrition [ ]     REFERRALS:   [ ]Chaplaincy  [ ] Hospice  [ ]Child Life  [ ]Social Work  [ ]Case management [ ]Holistic Therapy   Goals of Care Discussion Document: HPI:  80 y/o male pmh htn, hld, CAD s/p CABG c/o fever and cough x1 week. Pt admits to dry cough, subjective fever and sob over the last 1 week. Pt admits to worsening sob x2 days. Denies recent travel or sick contacts. Denies chest pain, palpitations, n/v/d, numbness, tingling, dizziness or syncope. (01 Apr 2020 17:13)    PERTINENT PM/SXH:   Hypercholesterolemia  Old MI (myocardial infarction)  S/P CABG x 3  CAD (coronary artery disease)  Essential hypertension  Prediabetes    S/P CABG x 3  History of appendectomy    FAMILY HISTORY:  No pertinent family history in first degree relatives    ITEMS NOT CHECKED ARE NOT PRESENT    SOCIAL HISTORY:   Significant other/partner:  [x ]  Children:  [x ]  Temple/Spirituality: Rastafarian  Substance hx:  [ ]   Tobacco hx:  [x ]   Alcohol hx: [ ]   Home Opioid hx:  [ ] I-Stop Reference No:  Living Situation: [x ]Home  [ ]Long term care  [ ]Rehab [ ]Other    ADVANCE DIRECTIVES:    DNR  Yes  MOLST  [ ]  Living Will  [ ]   DECISION MAKER(s):  [x ] Health Care Proxy(s)  [ ] Surrogate(s)  [ ] Guardian           Name(s): Phone Number(s): Glenny Carrera (spouse)    BASELINE (I)ADL(s) (prior to admission):  [place]: [ ]Total  [ x] Moderate [ ]Dependent    Allergies    No Known Allergies    Intolerances    MEDICATIONS  (STANDING):  ALBUTerol    90 MICROgram(s) HFA Inhaler 1 Puff(s) Inhalation every 4 hours  aspirin  chewable 81 milliGRAM(s) Oral daily  atorvastatin 20 milliGRAM(s) Oral at bedtime  carvedilol 25 milliGRAM(s) Oral every 12 hours  dextrose 5%. 1000 milliLiter(s) (50 mL/Hr) IV Continuous <Continuous>  dextrose 5%. 1000 milliLiter(s) (50 mL/Hr) IV Continuous <Continuous>  dextrose 50% Injectable 12.5 Gram(s) IV Push once  dextrose 50% Injectable 25 Gram(s) IV Push once  dextrose 50% Injectable 25 Gram(s) IV Push once  dextrose 50% Injectable 12.5 Gram(s) IV Push once  dextrose 50% Injectable 25 Gram(s) IV Push once  dextrose 50% Injectable 25 Gram(s) IV Push once  enoxaparin Injectable 80 milliGRAM(s) SubCutaneous two times a day  furosemide   Injectable 40 milliGRAM(s) IV Push once  furosemide   Injectable 40 milliGRAM(s) IV Push every 12 hours  insulin glargine Injectable (LANTUS) 15 Unit(s) SubCutaneous at bedtime  insulin lispro (HumaLOG) corrective regimen sliding scale   SubCutaneous three times a day before meals  melatonin 6 milliGRAM(s) Oral at bedtime  vancomycin  IVPB 750 milliGRAM(s) IV Intermittent every 12 hours    MEDICATIONS  (PRN):  acetaminophen   Tablet .. 650 milliGRAM(s) Oral every 4 hours PRN Temp greater or equal to 38.5C (101.3F)  benzocaine 20% Spray 1 Spray(s) Topical three times a day PRN sore throat  dextrose 40% Gel 15 Gram(s) Oral once PRN Blood Glucose LESS THAN 70 milliGRAM(s)/deciLiter  dextrose 40% Gel 15 Gram(s) Oral once PRN Blood Glucose LESS THAN 70 milliGRAM(s)/deciliter  dextrose 40% Gel 15 Gram(s) Oral once PRN Blood Glucose LESS THAN 70 milliGRAM(s)/deciLiter  dextrose 40% Gel 15 Gram(s) Oral once PRN Blood Glucose LESS THAN 70 milliGRAM(s)/deciliter  dextrose 40% Gel 15 Gram(s) Oral once PRN Blood Glucose LESS THAN 70 milliGRAM(s)/deciliter  glucagon  Injectable 1 milliGRAM(s) IntraMuscular once PRN Glucose <70 milliGRAM(s)/deciLiter  glucagon  Injectable 1 milliGRAM(s) IntraMuscular once PRN Glucose LESS THAN 70 milligrams/deciliter    PRESENT SYMPTOMS: [ ]Unable to obtain due to poor mentation   Source if other than patient:  [ ]Family   [ ]Team     Pain (Impact on QOL):  None  Location -         Minimal acceptable level (0-10 scale):                    Aggravating factors -  Quality -  Radiation -  Severity (0-10 scale) -    Timing -    PAIN AD Score:     [email or web address] (press ctrl +  left click to view)    Dyspnea:                           [ ]Mild [ ]Moderate [x ]Severe  Anxiety:                             [ x]Mild [ ]Moderate [ ]Severe  Fatigue:                             [ ]Mild [ ]Moderate [ x]Severe  Nausea:                             [ ]Mild [ ]Moderate [ ]Severe  Loss of appetite:              [ ]Mild [ ]Moderate [x ]Severe  Constipation:                    [ ]Mild [ ]Moderate [ ]Severe  Grief Present                    [ ] Yes   [x ] No   Other Symptoms:  [x ]All other review of systems negative     Karnofsky Performance Score/Palliative Performance Status Version 2: 40        %    http://palliative.info/resource_material/[email or web address].pdf  PHYSICAL EXAM:  Vital Signs Last 24 Hrs  T(C): 36.8 (20 Apr 2020 22:44), Max: 36.8 (20 Apr 2020 22:44)  T(F): 98.2 (20 Apr 2020 22:44), Max: 98.2 (20 Apr 2020 22:44)  HR: 62 (20 Apr 2020 22:44) (61 - 62)  BP: 119/58 (20 Apr 2020 22:44) (108/58 - 119/58)  BP(mean): --  RR: 32 (20 Apr 2020 22:44) (28 - 32)  SpO2: 100% (20 Apr 2020 22:44) (100% - 100%) I&O's Summary    20 Apr 2020 07:01  -  21 Apr 2020 07:00  --------------------------------------------------------  IN: 0 mL / OUT: 250 mL / NET: -250 mL      CRITICAL CARE:  [ ] Shock Present  [ ]Septic [ ]Cardiogenic [ ]Neurologic [ ]Hypovolemic  [ ]  Vasopressors [ ]  Inotropes   [x ] Respiratory failure present  [x ] Acute  [ ] Chronic [ x] Hypoxic  [ ] Hypercarbic [ ] Other  [ ] Other organ failure     LABS:                        14.3   14.05 )-----------( 104      ( 21 Apr 2020 05:50 )             42.8   04-21    137  |  89<L>  |  41<H>  ----------------------------<  45<LL>  3.1<L>   |  34<H>  |  0.86    Ca    8.3<L>      21 Apr 2020 05:50    TPro  6.4  /  Alb  2.1<L>  /  TBili  0.9  /  DBili  x   /  AST  30  /  ALT  12  /  AlkPhos  77  04-21        RADIOLOGY & ADDITIONAL STUDIES:    PROTEIN CALORIE MALNUTRITION PRESENT: [ ] Yes [ ] No  [ ] PPSV2 < or = to 30% [ ] significant weight loss  [ ] poor nutritional intake [ ] catabolic state [ ] anasarca     Albumin, Serum: 2.1 g/dL (04-21-20 @ 05:50)  Artificial Nutrition [ ]     REFERRALS:   [ ]Chaplaincy  [ ] Hospice  [ ]Child Life  [ ]Social Work  [ ]Case management [ ]Holistic Therapy   Goals of Care Discussion Document:

## 2020-04-20 NOTE — PROGRESS NOTE ADULT - PROBLEM SELECTOR PLAN 1
had clinical improvement after lasix , solumedrol, therapeutic Lovenox, and abx on 4/13.  Patient has been increasingly SOB x 72 hours but O2Sat 100% on NRB  -CXR 4/16 shows worsening b/l opacities  - completed 6 days of hydroxychloroquine, inflammatory markers trending down   - s/p  Anakinra   - c/w Zosyn/vanco  - c/w therapeutic Lovenox since may have a PE; Check CTPA when less hypoxic and able to lie flat.  c/w Lasix 40mg IV q12hr for SBP > 110; Lasix held due to lower BPs  Spoke to Pulmonary and patient not a candidate for tosculimab b/c already got anakinra and CRP only 12  -Called Blood bank to give plasma for COVID 19 @ [phone] and they will call back in 24 hours to tell me if he qualifies.   - ABG and if PaO2 < 60 MICU c/s for intubation- family/pt still undecided about intubation  - CXR on 4/18 with mild improvement   - Patient resended his  DNR/DNI; spoke to wife lashawn and wants Full Code. family and pt to decide in regards to intubation   Prognosis guarded had clinical improvement after lasix, solumedrol, therapeutic Lovenox, and abx on 4/13.  -CXR 4/16 shows worsening b/l opacities  - completed 6 days of hydroxychloroquine, inflammatory markers trending down   - s/p  Anakinra   - c/w Zosyn/vanco now redosed at 750 repeat trough after 3 dose  - c/w therapeutic Lovenox since may have a PE; Check CTPA when less hypoxic and able to lie flat.  Spoke to Pulmonary and patient not a candidate for tosculimab b/c already got anakinra and CRP only 12  -Called Blood bank to give plasma for COVID 19 @ [phone] and they will call back in 24 hours to tell me if he qualifies.   - ABG and if PaO2 < 60 MICU c/s for intubation- family/pt still undecided about intubation  - CXR on 4/18 with mild improvement    Lasix held due to lower BPs--repeat proBNP in AM elevated HCO3 secondary to diuresis will discontinue lasix for now repeat CXR in AM. repeat VBG.   - Patient rescinded his  DNR/DNI; spoke to wife lashawn understands prognosis  guarded has not had a chance to speak with her  in regards to code status. will consult palliative care for [facility]. Wife Glenny is next of kin [phone].

## 2020-04-20 NOTE — CONSULT NOTE ADULT - PROBLEM SELECTOR RECOMMENDATION 9
- Patient has been persistently hypoxic, with severe COVID infection.  - Requiring NRB.  - Patient is s/p Anakinra, Plaquenil and Solumedrol.

## 2020-04-20 NOTE — PROGRESS NOTE ADULT - SUBJECTIVE AND OBJECTIVE BOX
[facility] Division of Hospital Medicine  Genaro Zendejas MD  Pager (M-F, 0M-5P): [phone]  Other Times:  [phone]    Patient is a 79y old  Male who presents with a chief complaint of covid r/o (19 Apr 2020 21:08)    SUBJECTIVE / OVERNIGHT EVENTS: RRT called     MEDICATIONS  (STANDING):  ALBUTerol    90 MICROgram(s) HFA Inhaler 1 Puff(s) Inhalation every 4 hours  aspirin  chewable 81 milliGRAM(s) Oral daily  atorvastatin 20 milliGRAM(s) Oral at bedtime  carvedilol 25 milliGRAM(s) Oral every 12 hours  dextrose 5%. 1000 milliLiter(s) (50 mL/Hr) IV Continuous <Continuous>  dextrose 5%. 1000 milliLiter(s) (50 mL/Hr) IV Continuous <Continuous>  dextrose 50% Injectable 12.5 Gram(s) IV Push once  dextrose 50% Injectable 25 Gram(s) IV Push once  dextrose 50% Injectable 25 Gram(s) IV Push once  dextrose 50% Injectable 12.5 Gram(s) IV Push once  dextrose 50% Injectable 25 Gram(s) IV Push once  dextrose 50% Injectable 25 Gram(s) IV Push once  enoxaparin Injectable 80 milliGRAM(s) SubCutaneous two times a day  furosemide   Injectable 40 milliGRAM(s) IV Push every 12 hours  furosemide   Injectable 40 milliGRAM(s) IV Push once  insulin glargine Injectable (LANTUS) 15 Unit(s) SubCutaneous at bedtime  insulin lispro (HumaLOG) corrective regimen sliding scale   SubCutaneous three times a day before meals  melatonin 6 milliGRAM(s) Oral at bedtime  piperacillin/tazobactam IVPB.. 3.375 Gram(s) IV Intermittent every 8 hours  vancomycin  IVPB 750 milliGRAM(s) IV Intermittent every 12 hours    MEDICATIONS  (PRN):  acetaminophen   Tablet .. 650 milliGRAM(s) Oral every 4 hours PRN Temp greater or equal to 38.5C (101.3F)  benzocaine 20% Spray 1 Spray(s) Topical three times a day PRN sore throat  dextrose 40% Gel 15 Gram(s) Oral once PRN Blood Glucose LESS THAN 70 milliGRAM(s)/deciLiter  dextrose 40% Gel 15 Gram(s) Oral once PRN Blood Glucose LESS THAN 70 milliGRAM(s)/deciliter  glucagon  Injectable 1 milliGRAM(s) IntraMuscular once PRN Glucose <70 milliGRAM(s)/deciLiter  glucagon  Injectable 1 milliGRAM(s) IntraMuscular once PRN Glucose LESS THAN 70 milligrams/deciliter      Vital Signs Last 24 Hrs  T(C): 36.4 (20 Apr 2020 10:22), Max: 36.5 (19 Apr 2020 22:08)  T(F): 97.5 (20 Apr 2020 10:22), Max: 97.7 (19 Apr 2020 22:08)  HR: 60 (20 Apr 2020 10:22) (59 - 71)  BP: 103/45 (20 Apr 2020 10:22) (103/45 - 118/54)  BP(mean): --  RR: 26 (20 Apr 2020 10:22) (26 - 37)  SpO2: 100% (20 Apr 2020 10:22) (100% - 100%)  CAPILLARY BLOOD GLUCOSE      POCT Blood Glucose.: 114 mg/dL (20 Apr 2020 07:30)  POCT Blood Glucose.: 134 mg/dL (19 Apr 2020 22:14)  POCT Blood Glucose.: 148 mg/dL (19 Apr 2020 16:56)  POCT Blood Glucose.: 159 mg/dL (19 Apr 2020 12:23)    I&O's Summary    19 Apr 2020 07:01  -  20 Apr 2020 07:00  --------------------------------------------------------  IN: 350 mL / OUT: 100 mL / NET: 250 mL    20 Apr 2020 07:01  -  20 Apr 2020 11:23  --------------------------------------------------------  IN: 0 mL / OUT: 250 mL / NET: -250 mL        PHYSICAL EXAM:  GENERAL: NAD  HEAD:  Atraumatic, Normocephalic  EYES: EOMI, PERRLA, conjunctiva and sclera clear  NECK: Supple, No JVD  CHEST/LUNG: No respiratory distress  ABDOMEN: Nondistended; Bowel sounds present  EXTREMITIES:  No clubbing, cyanosis, or edema  PSYCH: Calm  NEUROLOGY: A/Ox3, non-focal  SKIN: No rashes or lesions    LABS:                        16.1   13.26 )-----------( 124      ( 20 Apr 2020 04:40 )             49.0     04-20    136  |  89<L>  |  40<H>  ----------------------------<  96  3.9   |  34<H>  |  0.93    Ca    8.5      20 Apr 2020 04:40    TPro  7.5  /  Alb  2.2<L>  /  TBili  0.9  /  DBili  x   /  AST  35  /  ALT  13  /  AlkPhos  76  04-20              Procalcitonin, Serum: 0.09 ng/mL (04-18-20 @ 07:51)  Procalcitonin, Serum: 0.05 ng/mL (04-16-20 @ 05:51)    C-Reactive Protein, Serum: 12.6 mg/L (04-18-20 @ 07:51)  C-Reactive Protein, Serum: 20.9 mg/L (04-16-20 @ 05:51)      D-Dimer Assay, Quantitative: 1975 ng/mL (04-16-20 @ 05:51)    Ferritin, Serum: 1115 ng/mL (04-18-20 @ 07:51)  Ferritin, Serum: 1268 ng/mL (04-16-20 @ 05:51)      RADIOLOGY & ADDITIONAL TESTS:    Imaging Personally Reviewed:    Care Discussed with Consultants/Other Providers: [facility] Division of Hospital Medicine  Genaro Zendejas MD  Pager (M-F, 8A-5P): [phone]  Other Times:  [phone]    Patient is a 79y old  Male who presents with a chief complaint of covid r/o (19 Apr 2020 21:08)    SUBJECTIVE / OVERNIGHT EVENTS: Pt states he feels slightly better today. when asked about intubation he states I'm feeling better today. denies CP/N/V. lasix not given     MEDICATIONS  (STANDING):  ALBUTerol    90 MICROgram(s) HFA Inhaler 1 Puff(s) Inhalation every 4 hours  aspirin  chewable 81 milliGRAM(s) Oral daily  atorvastatin 20 milliGRAM(s) Oral at bedtime  carvedilol 25 milliGRAM(s) Oral every 12 hours  dextrose 5%. 1000 milliLiter(s) (50 mL/Hr) IV Continuous <Continuous>  dextrose 5%. 1000 milliLiter(s) (50 mL/Hr) IV Continuous <Continuous>  dextrose 50% Injectable 12.5 Gram(s) IV Push once  dextrose 50% Injectable 25 Gram(s) IV Push once  dextrose 50% Injectable 25 Gram(s) IV Push once  dextrose 50% Injectable 12.5 Gram(s) IV Push once  dextrose 50% Injectable 25 Gram(s) IV Push once  dextrose 50% Injectable 25 Gram(s) IV Push once  enoxaparin Injectable 80 milliGRAM(s) SubCutaneous two times a day  furosemide   Injectable 40 milliGRAM(s) IV Push every 12 hours  furosemide   Injectable 40 milliGRAM(s) IV Push once  insulin glargine Injectable (LANTUS) 15 Unit(s) SubCutaneous at bedtime  insulin lispro (HumaLOG) corrective regimen sliding scale   SubCutaneous three times a day before meals  melatonin 6 milliGRAM(s) Oral at bedtime  piperacillin/tazobactam IVPB.. 3.375 Gram(s) IV Intermittent every 8 hours  vancomycin  IVPB 750 milliGRAM(s) IV Intermittent every 12 hours    MEDICATIONS  (PRN):  acetaminophen   Tablet .. 650 milliGRAM(s) Oral every 4 hours PRN Temp greater or equal to 38.5C (101.3F)  benzocaine 20% Spray 1 Spray(s) Topical three times a day PRN sore throat  dextrose 40% Gel 15 Gram(s) Oral once PRN Blood Glucose LESS THAN 70 milliGRAM(s)/deciLiter  dextrose 40% Gel 15 Gram(s) Oral once PRN Blood Glucose LESS THAN 70 milliGRAM(s)/deciliter  glucagon  Injectable 1 milliGRAM(s) IntraMuscular once PRN Glucose <70 milliGRAM(s)/deciLiter  glucagon  Injectable 1 milliGRAM(s) IntraMuscular once PRN Glucose LESS THAN 70 milligrams/deciliter      Vital Signs Last 24 Hrs  T(C): 36.4 (20 Apr 2020 10:22), Max: 36.5 (19 Apr 2020 22:08)  T(F): 97.5 (20 Apr 2020 10:22), Max: 97.7 (19 Apr 2020 22:08)  HR: 60 (20 Apr 2020 10:22) (59 - 71)  BP: 103/45 (20 Apr 2020 10:22) (103/45 - 118/54)  BP(mean): --  RR: 26 (20 Apr 2020 10:22) (26 - 37)  SpO2: 100% (20 Apr 2020 10:22) (100% - 100%)  CAPILLARY BLOOD GLUCOSE      POCT Blood Glucose.: 114 mg/dL (20 Apr 2020 07:30)  POCT Blood Glucose.: 134 mg/dL (19 Apr 2020 22:14)  POCT Blood Glucose.: 148 mg/dL (19 Apr 2020 16:56)  POCT Blood Glucose.: 159 mg/dL (19 Apr 2020 12:23)    I&O's Summary    19 Apr 2020 07:01  -  20 Apr 2020 07:00  --------------------------------------------------------  IN: 350 mL / OUT: 100 mL / NET: 250 mL    20 Apr 2020 07:01  -  20 Apr 2020 11:23  --------------------------------------------------------  IN: 0 mL / OUT: 250 mL / NET: -250 mL        PHYSICAL EXAM:  GENERAL: NAD  HEAD:  Atraumatic, Normocephalic  EYES: EOMI, PERRLA, conjunctiva and sclera clear  NECK: Supple, No JVD  CHEST/LUNG: No respiratory distress  ABDOMEN: Nondistended; Bowel sounds present  EXTREMITIES:  No clubbing, cyanosis, or edema  PSYCH: Calm  NEUROLOGY: A/Ox3, non-focal  SKIN: No rashes or lesions    LABS:                        16.1   13.26 )-----------( 124      ( 20 Apr 2020 04:40 )             49.0     04-20    136  |  89<L>  |  40<H>  ----------------------------<  96  3.9   |  34<H>  |  0.93    Ca    8.5      20 Apr 2020 04:40    TPro  7.5  /  Alb  2.2<L>  /  TBili  0.9  /  DBili  x   /  AST  35  /  ALT  13  /  AlkPhos  76  04-20              Procalcitonin, Serum: 0.09 ng/mL (04-18-20 @ 07:51)  Procalcitonin, Serum: 0.05 ng/mL (04-16-20 @ 05:51)    C-Reactive Protein, Serum: 12.6 mg/L (04-18-20 @ 07:51)  C-Reactive Protein, Serum: 20.9 mg/L (04-16-20 @ 05:51)      D-Dimer Assay, Quantitative: 1975 ng/mL (04-16-20 @ 05:51)    Ferritin, Serum: 1115 ng/mL (04-18-20 @ 07:51)  Ferritin, Serum: 1268 ng/mL (04-16-20 @ 05:51)      RADIOLOGY & ADDITIONAL TESTS:    Imaging Personally Reviewed:    Care Discussed with Consultants/Other Providers:

## 2020-04-20 NOTE — PROGRESS NOTE ADULT - ASSESSMENT
78 y/o male w/ hx HTN, hyperlipidemia, CAD s/p CABG admitted for COVID19 with acute hypoxic respiratory failure and viral pneumonia. He is s/p hydroxychlorquine, anakindra. He has also been on Solumedrol, Lasix and zosyn/vanco. Patient was clinically improving on treatment but despite O2 sat 100% NRB , afebrile, and stable BPs, patient' s work of breathing has worsened. There was also concern for acute PE so he was also started on therapeutic lovenox without a CTPA. He resended his DNR/DNI and now wants to be intubated. Spoke to wife and she says to obey his wishes. 80 y/o male w/ hx HTN, hyperlipidemia, CAD s/p CABG admitted for COVID19 with acute hypoxic respiratory failure and viral pneumonia. He is s/p hydroxychlorquine, anakindra. He has also been on Solumedrol, Lasix and zosyn/vanco. Patient was clinically improving on treatment but despite O2 sat 100% NRB , afebrile, and stable BPs, patient' s work of breathing has worsened. There was also concern for acute PE so he was also started on therapeutic lovenox without a CTPA. He resended his DNR/DNI and now wants to be intubated.

## 2020-04-20 NOTE — CONSULT NOTE ADULT - PROBLEM SELECTOR RECOMMENDATION 3
- Due to the patient's health status and restrictions on visitation during the Public Health Emergency, the Advanced Care Planning services was performed via telephone with patient's wife, Glenny Carrera.  - Patient was unable to participate.  - 60 minutes spent discussing code status, overall prognosis, as well as goals of care.   - As per wife, she understands overall poor prognosis, and is asking for overall care to continue.  - However, she is interested in DNR/DNI.   - She states that the patient has made it known to her and their two kids that he would not want to be on a ventilator, and if the need arose, he would want to be comfortable with the assistance of pain medications.   - DNR entered in the chart.

## 2020-04-20 NOTE — CONSULT NOTE ADULT - SUBJECTIVE AND OBJECTIVE BOX
HPI:  78 y/o male pmh htn, hld, CAD s/p CABG c/o fever and cough x1 week. Pt admits to dry cough, subjective fever and sob over the last 1 week. Pt admits to worsening sob x2 days. Denies recent travel or sick contacts. Denies chest pain, palpitations, n/v/d, numbness, tingling, dizziness or syncope. (01 Apr 2020 17:13)    PERTINENT PM/SXH:   Hypercholesterolemia  Old MI (myocardial infarction)  S/P CABG x 3  CAD (coronary artery disease)  Essential hypertension  Prediabetes    S/P CABG x 3  History of appendectomy    FAMILY HISTORY:  No pertinent family history in first degree relatives    ITEMS NOT CHECKED ARE NOT PRESENT    SOCIAL HISTORY:   Significant other/partner:  [ ]  Children:  [ ]  Synagogue/Spirituality:  Substance hx:  [ ]   Tobacco hx:  [ ]   Alcohol hx: [ ]   Home Opioid hx:  [ ] I-Stop Reference No:  Living Situation: [ ]Home  [ ]Long term care  [ ]Rehab [ ]Other    ADVANCE DIRECTIVES:    DNR  Yes  MOLST  [ ]  Living Will  [ ]   DECISION MAKER(s):  [ ] Health Care Proxy(s)  [ ] Surrogate(s)  [ ] Guardian           Name(s): Phone Number(s):    BASELINE (I)ADL(s) (prior to admission):  [place]: [ ]Total  [ ] Moderate [ ]Dependent    Allergies    No Known Allergies    Intolerances    MEDICATIONS  (STANDING):  ALBUTerol    90 MICROgram(s) HFA Inhaler 1 Puff(s) Inhalation every 4 hours  aspirin  chewable 81 milliGRAM(s) Oral daily  atorvastatin 20 milliGRAM(s) Oral at bedtime  carvedilol 25 milliGRAM(s) Oral every 12 hours  dextrose 5%. 1000 milliLiter(s) (50 mL/Hr) IV Continuous <Continuous>  dextrose 5%. 1000 milliLiter(s) (50 mL/Hr) IV Continuous <Continuous>  dextrose 50% Injectable 12.5 Gram(s) IV Push once  dextrose 50% Injectable 25 Gram(s) IV Push once  dextrose 50% Injectable 25 Gram(s) IV Push once  dextrose 50% Injectable 12.5 Gram(s) IV Push once  dextrose 50% Injectable 25 Gram(s) IV Push once  dextrose 50% Injectable 25 Gram(s) IV Push once  dextrose 50% Injectable 25 Gram(s) IV Push once  enoxaparin Injectable 80 milliGRAM(s) SubCutaneous two times a day  furosemide   Injectable 40 milliGRAM(s) IV Push once  furosemide   Injectable 40 milliGRAM(s) IV Push every 12 hours  insulin glargine Injectable (LANTUS) 15 Unit(s) SubCutaneous at bedtime  insulin lispro (HumaLOG) corrective regimen sliding scale   SubCutaneous three times a day before meals  melatonin 6 milliGRAM(s) Oral at bedtime  vancomycin  IVPB 750 milliGRAM(s) IV Intermittent every 12 hours    MEDICATIONS  (PRN):  acetaminophen   Tablet .. 650 milliGRAM(s) Oral every 4 hours PRN Temp greater or equal to 38.5C (101.3F)  benzocaine 20% Spray 1 Spray(s) Topical three times a day PRN sore throat  dextrose 40% Gel 15 Gram(s) Oral once PRN Blood Glucose LESS THAN 70 milliGRAM(s)/deciLiter  dextrose 40% Gel 15 Gram(s) Oral once PRN Blood Glucose LESS THAN 70 milliGRAM(s)/deciliter  dextrose 40% Gel 15 Gram(s) Oral once PRN Blood Glucose LESS THAN 70 milliGRAM(s)/deciLiter  dextrose 40% Gel 15 Gram(s) Oral once PRN Blood Glucose LESS THAN 70 milliGRAM(s)/deciliter  dextrose 40% Gel 15 Gram(s) Oral once PRN Blood Glucose LESS THAN 70 milliGRAM(s)/deciliter  glucagon  Injectable 1 milliGRAM(s) IntraMuscular once PRN Glucose <70 milliGRAM(s)/deciLiter  glucagon  Injectable 1 milliGRAM(s) IntraMuscular once PRN Glucose LESS THAN 70 milligrams/deciliter    PRESENT SYMPTOMS: [ ]Unable to obtain due to poor mentation   Source if other than patient:  [ ]Family   [ ]Team     Pain (Impact on QOL):    Location -         Minimal acceptable level (0-10 scale):                    Aggravating factors -  Quality -  Radiation -  Severity (0-10 scale) -    Timing -    PAIN AD Score:     [email or web address] (press ctrl +  left click to view)    Dyspnea:                           [ ]Mild [ ]Moderate [ ]Severe  Anxiety:                             [ ]Mild [ ]Moderate [ ]Severe  Fatigue:                             [ ]Mild [ ]Moderate [ ]Severe  Nausea:                             [ ]Mild [ ]Moderate [ ]Severe  Loss of appetite:              [ ]Mild [ ]Moderate [ ]Severe  Constipation:                    [ ]Mild [ ]Moderate [ ]Severe  Grief Present                    [ ] Yes   [ ] No   Other Symptoms:  [ ]All other review of systems negative     Karnofsky Performance Score/Palliative Performance Status Version 2:         %    http://palliative.info/resource_material/[email or web address].pdf  PHYSICAL EXAM:  Vital Signs Last 24 Hrs  T(C): 36.8 (20 Apr 2020 22:44), Max: 36.8 (20 Apr 2020 22:44)  T(F): 98.2 (20 Apr 2020 22:44), Max: 98.2 (20 Apr 2020 22:44)  HR: 62 (20 Apr 2020 22:44) (60 - 62)  BP: 119/58 (20 Apr 2020 22:44) (103/45 - 119/58)  BP(mean): --  RR: 32 (20 Apr 2020 22:44) (26 - 32)  SpO2: 100% (20 Apr 2020 22:44) (100% - 100%) I&O's Summary    20 Apr 2020 07:01  -  21 Apr 2020 07:00  --------------------------------------------------------  IN: 0 mL / OUT: 250 mL / NET: -250 mL    GENERAL:  [ ]Alert  [ ]Oriented x   [ ]Lethargic  [ ]Cachexia  [ ]Unarousable  [ ]Verbal  [ ]Non-Verbal  Behavioral:   [ ] Anxiety  [ ] Delirium [ ] Agitation [ ] Other  HEENT:  [ ]Normal   [ ]Dry mouth   [ ]ET Tube/Trach  [ ]Oral lesions  PULMONARY:   [ ]Clear [ ]Tachypnea  [ ]Audible excessive secretions   [ ]Rhonchi        [ ]Right [ ]Left [ ]Bilateral  [ ]Crackles        [ ]Right [ ]Left [ ]Bilateral  [ ]Wheezing     [ ]Right [ ]Left [ ]Bilateral  CARDIOVASCULAR:    [ ]Regular [ ]Irregular [ ]Tachy  [ ]Ray [ ]Murmur [ ]Other  GASTROINTESTINAL:  [ ]Soft  [ ]Distended   [ ]+BS  [ ]Non tender [ ]Tender  [ ]PEG [ ]OGT/ NGT  Last BM:   GENITOURINARY:  [ ]Normal [ ] Incontinent   [ ]Oliguria/Anuria   [ ]Peña  MUSCULOSKELETAL:   [ ]Normal   [ ]Weakness  [ ]Bed/Wheelchair bound [ ]Edema  NEUROLOGIC:   [ ]No focal deficits  [ ] Cognitive impairment  [ ] Dysphagia [ ]Dysarthria [ ] Paresis [ ]Other   SKIN:   [ ]Normal   [ ]Pressure ulcer(s)  [ ]Rash    CRITICAL CARE:  [ ] Shock Present  [ ]Septic [ ]Cardiogenic [ ]Neurologic [ ]Hypovolemic  [ ]  Vasopressors [ ]  Inotropes   [ ] Respiratory failure present  [ ] Acute  [ ] Chronic [ ] Hypoxic  [ ] Hypercarbic [ ] Other  [ ] Other organ failure     LABS:                        14.3   14.05 )-----------( 104      ( 21 Apr 2020 05:50 )             42.8   04-20    136  |  89<L>  |  40<H>  ----------------------------<  96  3.9   |  34<H>  |  0.93    Ca    8.5      20 Apr 2020 04:40    TPro  7.5  /  Alb  2.2<L>  /  TBili  0.9  /  DBili  x   /  AST  35  /  ALT  13  /  AlkPhos  76  04-20        RADIOLOGY & ADDITIONAL STUDIES:    PROTEIN CALORIE MALNUTRITION PRESENT: [ ] Yes [ ] No  [ ] PPSV2 < or = to 30% [ ] significant weight loss  [ ] poor nutritional intake [ ] catabolic state [ ] anasarca     Albumin, Serum: 2.2 g/dL (04-20-20 @ 04:40)  Artificial Nutrition [ ]     REFERRALS:   [ ]Chaplaincy  [ ] Hospice  [ ]Child Life  [ ]Social Work  [ ]Case management [ ]Holistic Therapy   Goals of Care Discussion Document:

## 2020-04-21 DIAGNOSIS — R53.81 OTHER MALAISE: ICD-10-CM

## 2020-04-21 DIAGNOSIS — Z51.5 ENCOUNTER FOR PALLIATIVE CARE: ICD-10-CM

## 2020-04-21 LAB
ALBUMIN SERPL ELPH-MCNC: 2.1 G/DL — LOW (ref 3.3–5)
ALP SERPL-CCNC: 77 U/L — SIGNIFICANT CHANGE UP (ref 40–120)
ALT FLD-CCNC: 12 U/L — SIGNIFICANT CHANGE UP (ref 4–41)
ANION GAP SERPL CALC-SCNC: 14 MMO/L — SIGNIFICANT CHANGE UP (ref 7–14)
AST SERPL-CCNC: 30 U/L — SIGNIFICANT CHANGE UP (ref 4–40)
BILIRUB SERPL-MCNC: 0.9 MG/DL — SIGNIFICANT CHANGE UP (ref 0.2–1.2)
BUN SERPL-MCNC: 41 MG/DL — HIGH (ref 7–23)
CALCIUM SERPL-MCNC: 8.3 MG/DL — LOW (ref 8.4–10.5)
CHLORIDE SERPL-SCNC: 89 MMOL/L — LOW (ref 98–107)
CO2 SERPL-SCNC: 34 MMOL/L — HIGH (ref 22–31)
CREAT SERPL-MCNC: 0.86 MG/DL — SIGNIFICANT CHANGE UP (ref 0.5–1.3)
GLUCOSE SERPL-MCNC: 45 MG/DL — CRITICAL LOW (ref 70–99)
HCT VFR BLD CALC: 42.8 % — SIGNIFICANT CHANGE UP (ref 39–50)
HGB BLD-MCNC: 14.3 G/DL — SIGNIFICANT CHANGE UP (ref 13–17)
MCHC RBC-ENTMCNC: 30.8 PG — SIGNIFICANT CHANGE UP (ref 27–34)
MCHC RBC-ENTMCNC: 33.4 % — SIGNIFICANT CHANGE UP (ref 32–36)
MCV RBC AUTO: 92.2 FL — SIGNIFICANT CHANGE UP (ref 80–100)
NRBC # FLD: 0 K/UL — SIGNIFICANT CHANGE UP (ref 0–0)
NT-PROBNP SERPL-SCNC: 535.7 PG/ML — SIGNIFICANT CHANGE UP
PLATELET # BLD AUTO: 104 K/UL — LOW (ref 150–400)
PMV BLD: 11.1 FL — SIGNIFICANT CHANGE UP (ref 7–13)
POTASSIUM SERPL-MCNC: 3.1 MMOL/L — LOW (ref 3.5–5.3)
POTASSIUM SERPL-SCNC: 3.1 MMOL/L — LOW (ref 3.5–5.3)
PROT SERPL-MCNC: 6.4 G/DL — SIGNIFICANT CHANGE UP (ref 6–8.3)
RBC # BLD: 4.64 M/UL — SIGNIFICANT CHANGE UP (ref 4.2–5.8)
RBC # FLD: 12.8 % — SIGNIFICANT CHANGE UP (ref 10.3–14.5)
SODIUM SERPL-SCNC: 137 MMOL/L — SIGNIFICANT CHANGE UP (ref 135–145)
WBC # BLD: 14.05 K/UL — HIGH (ref 3.8–10.5)
WBC # FLD AUTO: 14.05 K/UL — HIGH (ref 3.8–10.5)

## 2020-04-21 PROCEDURE — 99233 SBSQ HOSP IP/OBS HIGH 50: CPT

## 2020-04-21 PROCEDURE — 99497 ADVNCD CARE PLAN 30 MIN: CPT | Mod: 25

## 2020-04-21 PROCEDURE — 71045 X-RAY EXAM CHEST 1 VIEW: CPT | Mod: 26

## 2020-04-21 RX ORDER — DEXTROSE 50 % IN WATER 50 %
15 SYRINGE (ML) INTRAVENOUS ONCE
Refills: 0 | Status: DISCONTINUED | OUTPATIENT
Start: 2020-04-21 | End: 2020-04-22

## 2020-04-21 RX ORDER — INSULIN GLARGINE 100 [IU]/ML
7 INJECTION, SOLUTION SUBCUTANEOUS AT BEDTIME
Refills: 0 | Status: DISCONTINUED | OUTPATIENT
Start: 2020-04-21 | End: 2020-04-22

## 2020-04-21 RX ORDER — DEXTROSE 50 % IN WATER 50 %
25 SYRINGE (ML) INTRAVENOUS ONCE
Refills: 0 | Status: COMPLETED | OUTPATIENT
Start: 2020-04-21 | End: 2020-04-21

## 2020-04-21 RX ADMIN — PIPERACILLIN AND TAZOBACTAM 25 GRAM(S): 4; .5 INJECTION, POWDER, LYOPHILIZED, FOR SOLUTION INTRAVENOUS at 06:22

## 2020-04-21 RX ADMIN — ENOXAPARIN SODIUM 80 MILLIGRAM(S): 100 INJECTION SUBCUTANEOUS at 18:11

## 2020-04-21 RX ADMIN — Medication 25 GRAM(S): at 08:26

## 2020-04-21 RX ADMIN — Medication 81 MILLIGRAM(S): at 12:04

## 2020-04-21 RX ADMIN — ENOXAPARIN SODIUM 80 MILLIGRAM(S): 100 INJECTION SUBCUTANEOUS at 06:43

## 2020-04-21 RX ADMIN — INSULIN GLARGINE 7 UNIT(S): 100 INJECTION, SOLUTION SUBCUTANEOUS at 22:15

## 2020-04-21 RX ADMIN — ATORVASTATIN CALCIUM 20 MILLIGRAM(S): 80 TABLET, FILM COATED ORAL at 22:15

## 2020-04-21 RX ADMIN — Medication 2: at 12:04

## 2020-04-21 RX ADMIN — Medication 6 MILLIGRAM(S): at 22:15

## 2020-04-21 NOTE — CHART NOTE - NSCHARTNOTEFT_GEN_A_CORE
palliative note appreciated, patient DNR/DNI    hypoglycemia this morning with FS 47 treated with gel and D50 at 25cc IVP x1 with resolution to FS 's  will decreased PM lantus dose by 50% to 7units and monitor closely    awaiting plasma team input palliative note appreciated, patient DNR/DNI    hypoglycemia this morning with FS 47 treated with gel and D50 at 25cc IVP x1 with resolution to FS 's  will decreased PM lantus dose by 50% to 7units and monitor closely    awaiting plasma team input    saw patient at bedside as RN noted hematoma of right arm. Patient with right arm bicep hematoma with extensive ecchymosis, appears to be old with healing stages of ecchymosis, firm with no increased warmth, no bleed, nontender. Suspect trauma from blood pressure cuff as very similar demarcations of BP cuff +/- possible anticoagulation injection (althought typically given in the abdomen) that may have infiltrated at some point during hospitalization. Will give supportive therapy with warm pack and elevation of arm as tolerated. palliative note appreciated, patient DNR/DNI    hypoglycemia this morning with FS 47 treated with gel and D50 at 25cc IVP x1 with resolution to FS 's  will decreased PM lantus dose by 50% to 7units and monitor closely    awaiting plasma team input    saw patient at bedside as RN noted hematoma of right arm. Patient with right arm bicep hematoma with extensive ecchymosis, appears to be old with healing stages of ecchymosis, firm with no increased warmth, no bleed, nontender. Suspect trauma from blood pressure cuff on a frail patient who is on full dose anticoagulation. Will give supportive therapy with warm pack and elevation of arm as tolerated.

## 2020-04-21 NOTE — PROGRESS NOTE ADULT - PROBLEM SELECTOR PLAN 1
had clinical improvement after lasix, solumedrol, therapeutic Lovenox, and abx on 4/13.  -CXR 4/16 shows worsening b/l opacities  - s/p  Anakinra/plaquenil/steroid/abx   - s/p Zosyn/vanco   - c/w therapeutic Lovenox since may have a PE; Check CTPA when less hypoxic and able to lie flat.  Spoke to Pulmonary and patient not a candidate for tosculimab b/c already got anakinra and CRP only 12  - CXR on 4/18 with mild improvement   - Lasix held due to lower BPs--f/u proBNP elevated HCO3 secondary to diuresis will discontinue lasix. repeat CXR widespread disease.  - Patient rescinded his  DNR/DNI pt unable to make decision deferring to wife.  Wife Glenny is next of kin [phone].   -Palliative care c/s reviewed DNR/DNI order in chart   -Called Blood bank to give plasma for COVID 19 @ [phone] will be evaluated today [id number] [facility]

## 2020-04-21 NOTE — PROGRESS NOTE ADULT - ATTENDING COMMENTS
Billing [id number]    J96.91
Billing [id number]    J96.91
attempted to call wife unable to reach
called home , wife was not home , spoke to sister Sherri , updates given    will request PT eval once pt weaned down to NC
Billing [id number]    J96.91

## 2020-04-21 NOTE — PROGRESS NOTE ADULT - SUBJECTIVE AND OBJECTIVE BOX
SUBJECTIVE AND OBJECTIVE:  INTERVAL HPI/OVERNIGHT EVENTS:    DNR on chart: Yes  Yes    Allergies    No Known Allergies    Intolerances    MEDICATIONS  (STANDING):  ALBUTerol    90 MICROgram(s) HFA Inhaler 1 Puff(s) Inhalation every 4 hours  aspirin  chewable 81 milliGRAM(s) Oral daily  atorvastatin 20 milliGRAM(s) Oral at bedtime  carvedilol 25 milliGRAM(s) Oral every 12 hours  dextrose 5%. 1000 milliLiter(s) (50 mL/Hr) IV Continuous <Continuous>  dextrose 5%. 1000 milliLiter(s) (50 mL/Hr) IV Continuous <Continuous>  dextrose 50% Injectable 12.5 Gram(s) IV Push once  dextrose 50% Injectable 25 Gram(s) IV Push once  dextrose 50% Injectable 25 Gram(s) IV Push once  dextrose 50% Injectable 12.5 Gram(s) IV Push once  dextrose 50% Injectable 25 Gram(s) IV Push once  dextrose 50% Injectable 25 Gram(s) IV Push once  enoxaparin Injectable 80 milliGRAM(s) SubCutaneous two times a day  furosemide   Injectable 40 milliGRAM(s) IV Push once  insulin glargine Injectable (LANTUS) 7 Unit(s) SubCutaneous at bedtime  insulin lispro (HumaLOG) corrective regimen sliding scale   SubCutaneous three times a day before meals  melatonin 6 milliGRAM(s) Oral at bedtime    MEDICATIONS  (PRN):  acetaminophen   Tablet .. 650 milliGRAM(s) Oral every 4 hours PRN Temp greater or equal to 38.5C (101.3F)  benzocaine 20% Spray 1 Spray(s) Topical three times a day PRN sore throat  dextrose 40% Gel 15 Gram(s) Oral once PRN Blood Glucose LESS THAN 70 milliGRAM(s)/deciLiter  dextrose 40% Gel 15 Gram(s) Oral once PRN Blood Glucose LESS THAN 70 milliGRAM(s)/deciliter  dextrose 40% Gel 15 Gram(s) Oral once PRN Blood Glucose LESS THAN 70 milliGRAM(s)/deciLiter  dextrose 40% Gel 15 Gram(s) Oral once PRN Blood Glucose LESS THAN 70 milliGRAM(s)/deciliter  dextrose 40% Gel 15 Gram(s) Oral once PRN Blood Glucose LESS THAN 70 milliGRAM(s)/deciliter  glucagon  Injectable 1 milliGRAM(s) IntraMuscular once PRN Glucose <70 milliGRAM(s)/deciLiter  glucagon  Injectable 1 milliGRAM(s) IntraMuscular once PRN Glucose LESS THAN 70 milligrams/deciliter      ITEMS UNCHECKED ARE NOT PRESENT    PRESENT SYMPTOMS: [x ]Unable to obtain due to poor mentation   Source if other than patient:  [ ]Family   [ ]Team     Pain (Impact on QOL):    Location:  Minimal acceptable level (0-10 scale):                   Aggravating factors:  Quality:  Radiation:  Severity (0-10 scale):    Timing:    Dyspnea:                           [ ]Mild [ ]Moderate [ ]Severe  Anxiety:                             [ ]Mild [ ]Moderate [ ]Severe  Fatigue:                             [ ]Mild [ ]Moderate [ ]Severe  Nausea:                             [ ]Mild [ ]Moderate [ ]Severe  Loss of appetite:              [ ]Mild [ ]Moderate [ ]Severe  Constipation:                    [ ]Mild [ ]Moderate [ ]Severe  Grief Present                    [ ] Yes  [ ] No   PAIN AD Score:	  [email or web address] (Ctrl + left click to view)    Other Symptoms:  [x ]All other review of systems negative     Karnofsky Performance Score/Palliative Performance Status Version 2:  30     %    http://palliative.info/resource_material/[email or web address].pdf  PHYSICAL EXAM:  Vital Signs Last 24 Hrs  T(C): 36.4 (22 Apr 2020 06:19), Max: 36.4 (21 Apr 2020 11:58)  T(F): 97.5 (22 Apr 2020 06:19), Max: 97.5 (21 Apr 2020 11:58)  HR: 75 (22 Apr 2020 07:25) (66 - 100)  BP: 81/42 (22 Apr 2020 07:25) (81/42 - 97/49)  BP(mean): --  RR: 32 (22 Apr 2020 06:19) (28 - 34)  SpO2: 98% (22 Apr 2020 06:19) (96% - 100%) I&O's Summary    21 Apr 2020 07:01  -  22 Apr 2020 07:00  --------------------------------------------------------  IN: 0 mL / OUT: 200 mL / NET: -200 mL    22 Apr 2020 07:01 - 22 Apr 2020 11:38  --------------------------------------------------------  IN: 120 mL / OUT: 0 mL / NET: 120 mL    CRITICAL CARE:  [ ] Shock Present  [ ]Septic [ ]Cardiogenic [ ]Neurologic [ ]Hypovolemic  [ ]  Vasopressors [ ]  Inotropes   [x ] Respiratory failure present  [ x] Acute  [ ] Chronic [x ] Hypoxic  [ ] Hypercarbic [ ] Other  [ ] Other organ failure     LABS:                        14.1   14.63 )-----------( 100      ( 22 Apr 2020 10:10 )             41.6   04-21    137  |  89<L>  |  41<H>  ----------------------------<  45<LL>  3.1<L>   |  34<H>  |  0.86    Ca    8.3<L>      21 Apr 2020 05:50    TPro  6.4  /  Alb  2.1<L>  /  TBili  0.9  /  DBili  x   /  AST  30  /  ALT  12  /  AlkPhos  77  04-21        RADIOLOGY & ADDITIONAL STUDIES:    Protein Calorie Malnutrition Present: [ ] yes [ ] no  [ ] PPSV2 < or = 30%  [ ] significant weight loss [ ] poor nutritional intake [ ] anasarca [ ] catabolic state Albumin, Serum: 2.1 g/dL (04-21-20 @ 05:50)  Artificial Nutrition [ ]     REFERRALS:   [ ]Chaplaincy  [ ] Hospice  [ ]Child Life  [ ]Social Work  [ ]Case management [ ]Holistic Therapy   Goals of Care Document:

## 2020-04-21 NOTE — PROGRESS NOTE ADULT - PROBLEM SELECTOR PLAN 3
A1c 9.3%  FS stable  hypoglycemia change Lantus 7 U units qHS.  Continue with FS qac and qHS  Low dose ISS  Holding home oral diabetic medications

## 2020-04-21 NOTE — PROGRESS NOTE ADULT - PROBLEM SELECTOR PLAN 4
- Patient with severe COVID infection.  - Worsening respiratory failure.   - Patients wife is in agreement with DNI.

## 2020-04-21 NOTE — PROGRESS NOTE ADULT - PROBLEM SELECTOR PLAN 5
- Patient with numerous comorbidities, severe COVID infection.   - Please refer to advanced care planning section for goals of care discussion.  - Comfort measures.   - Prognosis is guarded.

## 2020-04-21 NOTE — PROGRESS NOTE ADULT - SUBJECTIVE AND OBJECTIVE BOX
[facility] Division of Hospital Medicine  Genaro Zendejas MD  Pager (M-F, 8A-5P): [phone]  Other Times:  [phone]    Patient is a 79y old  Male who presents with a chief complaint of covid r/o (20 Apr 2020 16:06)    SUBJECTIVE / OVERNIGHT EVENTS: Pt dyspneic on NRB. +hypoglycemia yesterday to 47 lantus not given   MEDICATIONS  (STANDING):  ALBUTerol    90 MICROgram(s) HFA Inhaler 1 Puff(s) Inhalation every 4 hours  aspirin  chewable 81 milliGRAM(s) Oral daily  atorvastatin 20 milliGRAM(s) Oral at bedtime  carvedilol 25 milliGRAM(s) Oral every 12 hours  dextrose 5%. 1000 milliLiter(s) (50 mL/Hr) IV Continuous <Continuous>  dextrose 5%. 1000 milliLiter(s) (50 mL/Hr) IV Continuous <Continuous>  dextrose 50% Injectable 12.5 Gram(s) IV Push once  dextrose 50% Injectable 25 Gram(s) IV Push once  dextrose 50% Injectable 25 Gram(s) IV Push once  dextrose 50% Injectable 12.5 Gram(s) IV Push once  dextrose 50% Injectable 25 Gram(s) IV Push once  dextrose 50% Injectable 25 Gram(s) IV Push once  enoxaparin Injectable 80 milliGRAM(s) SubCutaneous two times a day  furosemide   Injectable 40 milliGRAM(s) IV Push once  furosemide   Injectable 40 milliGRAM(s) IV Push every 12 hours  insulin glargine Injectable (LANTUS) 7 Unit(s) SubCutaneous at bedtime  insulin lispro (HumaLOG) corrective regimen sliding scale   SubCutaneous three times a day before meals  melatonin 6 milliGRAM(s) Oral at bedtime  vancomycin  IVPB 750 milliGRAM(s) IV Intermittent every 12 hours    MEDICATIONS  (PRN):  acetaminophen   Tablet .. 650 milliGRAM(s) Oral every 4 hours PRN Temp greater or equal to 38.5C (101.3F)  benzocaine 20% Spray 1 Spray(s) Topical three times a day PRN sore throat  dextrose 40% Gel 15 Gram(s) Oral once PRN Blood Glucose LESS THAN 70 milliGRAM(s)/deciLiter  dextrose 40% Gel 15 Gram(s) Oral once PRN Blood Glucose LESS THAN 70 milliGRAM(s)/deciliter  dextrose 40% Gel 15 Gram(s) Oral once PRN Blood Glucose LESS THAN 70 milliGRAM(s)/deciLiter  dextrose 40% Gel 15 Gram(s) Oral once PRN Blood Glucose LESS THAN 70 milliGRAM(s)/deciliter  dextrose 40% Gel 15 Gram(s) Oral once PRN Blood Glucose LESS THAN 70 milliGRAM(s)/deciliter  glucagon  Injectable 1 milliGRAM(s) IntraMuscular once PRN Glucose <70 milliGRAM(s)/deciLiter  glucagon  Injectable 1 milliGRAM(s) IntraMuscular once PRN Glucose LESS THAN 70 milligrams/deciliter      Vital Signs Last 24 Hrs  T(C): 36.4 (21 Apr 2020 11:58), Max: 36.8 (20 Apr 2020 22:44)  T(F): 97.5 (21 Apr 2020 11:58), Max: 98.2 (20 Apr 2020 22:44)  HR: 66 (21 Apr 2020 11:58) (61 - 66)  BP: 96/45 (21 Apr 2020 11:58) (96/45 - 119/58)  BP(mean): --  RR: 28 (21 Apr 2020 11:58) (28 - 32)  SpO2: 100% (21 Apr 2020 11:58) (100% - 100%)  CAPILLARY BLOOD GLUCOSE      POCT Blood Glucose.: 231 mg/dL (21 Apr 2020 11:41)  POCT Blood Glucose.: 221 mg/dL (21 Apr 2020 09:12)  POCT Blood Glucose.: 237 mg/dL (21 Apr 2020 09:02)  POCT Blood Glucose.: 75 mg/dL (21 Apr 2020 08:59)  POCT Blood Glucose.: 68 mg/dL (21 Apr 2020 08:19)  POCT Blood Glucose.: 43 mg/dL (21 Apr 2020 07:46)  POCT Blood Glucose.: 47 mg/dL (21 Apr 2020 07:42)  POCT Blood Glucose.: 92 mg/dL (20 Apr 2020 21:36)  POCT Blood Glucose.: 108 mg/dL (20 Apr 2020 16:45)    I&O's Summary    20 Apr 2020 07:01  -  21 Apr 2020 07:00  --------------------------------------------------------  IN: 0 mL / OUT: 250 mL / NET: -250 mL        PHYSICAL EXAM:  GENERAL: dyspenic with prolonged conversation  HEAD:  Atraumatic, Normocephalic  EYES: EOMI,, conjunctiva and sclera clear  NECK: Supple, No JVD  CHEST/LUNG: decreased BS B/L  ABDOMEN: Nondistended; Bowel sounds present  EXTREMITIES:  No clubbing, cyanosis, or edema  PSYCH: Calm  NEUROLOGY: A/Ox2      LABS:                        14.3   14.05 )-----------( 104      ( 21 Apr 2020 05:50 )             42.8     04-21    137  |  89<L>  |  41<H>  ----------------------------<  45<LL>  3.1<L>   |  34<H>  |  0.86    Ca    8.3<L>      21 Apr 2020 05:50    TPro  6.4  /  Alb  2.1<L>  /  TBili  0.9  /  DBili  x   /  AST  30  /  ALT  12  /  AlkPhos  77  04-21              Procalcitonin, Serum: 0.09 ng/mL (04-18-20 @ 07:51)  Procalcitonin, Serum: 0.05 ng/mL (04-16-20 @ 05:51)    C-Reactive Protein, Serum: 12.6 mg/L (04-18-20 @ 07:51)  C-Reactive Protein, Serum: 20.9 mg/L (04-16-20 @ 05:51)      D-Dimer Assay, Quantitative: 1975 ng/mL (04-16-20 @ 05:51)    Ferritin, Serum: 1115 ng/mL (04-18-20 @ 07:51)  Ferritin, Serum: 1268 ng/mL (04-16-20 @ 05:51)      RADIOLOGY & ADDITIONAL TESTS:    Imaging Personally Reviewed:< from: Xray Chest 1 View- PORTABLE-Routine (04.21.20 @ 07:52) >  IMPRESSION: Status post sternotomy.    Widespread bilateral airspace disease throughout both lungs are unchanged. No pneumothorax.    < end of copied text >      Care Discussed with Consultants/Other Providers:

## 2020-04-21 NOTE — PROGRESS NOTE ADULT - ASSESSMENT
78 y/o male w/ hx HTN, hyperlipidemia, CAD s/p CABG admitted for COVID19 with acute hypoxic respiratory failure and viral pneumonia. He is s/p hydroxychlorquine, anakindra. He has also been on Solumedrol, Lasix and zosyn/vanco. Patient was clinically improving on treatment but despite O2 sat 100% NRB , afebrile, and stable BPs, patient' s work of breathing has worsened. There was also concern for acute PE so he was also started on therapeutic lovenox without a CTPA. He resended his DNR/DNI after discussion with family now DNR/DNI

## 2020-04-21 NOTE — PROGRESS NOTE ADULT - PROBLEM SELECTOR PLAN 2
s/p anakinra  Completed 5 days of Plaquenil  s/p Solumedrol   s/p zosyn/Vanco  c/w anakinra  Called convalescent plasma trial @ [phone] spoke to coordinator will be evaluated today

## 2020-04-22 VITALS
TEMPERATURE: 97 F | RESPIRATION RATE: 40 BRPM | HEART RATE: 74 BPM | OXYGEN SATURATION: 98 % | SYSTOLIC BLOOD PRESSURE: 85 MMHG | DIASTOLIC BLOOD PRESSURE: 40 MMHG

## 2020-04-22 DIAGNOSIS — Z71.89 OTHER SPECIFIED COUNSELING: ICD-10-CM

## 2020-04-22 DIAGNOSIS — D69.6 THROMBOCYTOPENIA, UNSPECIFIED: ICD-10-CM

## 2020-04-22 DIAGNOSIS — R19.7 DIARRHEA, UNSPECIFIED: ICD-10-CM

## 2020-04-22 LAB
BLD GP AB SCN SERPL QL: NEGATIVE — SIGNIFICANT CHANGE UP
D DIMER BLD IA.RAPID-MCNC: 854 NG/ML — SIGNIFICANT CHANGE UP
HCT VFR BLD CALC: 41.6 % — SIGNIFICANT CHANGE UP (ref 39–50)
HGB BLD-MCNC: 14.1 G/DL — SIGNIFICANT CHANGE UP (ref 13–17)
MCHC RBC-ENTMCNC: 31.8 PG — SIGNIFICANT CHANGE UP (ref 27–34)
MCHC RBC-ENTMCNC: 33.9 % — SIGNIFICANT CHANGE UP (ref 32–36)
MCV RBC AUTO: 93.7 FL — SIGNIFICANT CHANGE UP (ref 80–100)
NRBC # FLD: 0.03 K/UL — SIGNIFICANT CHANGE UP (ref 0–0)
PLATELET # BLD AUTO: 100 K/UL — LOW (ref 150–400)
PMV BLD: 12.1 FL — SIGNIFICANT CHANGE UP (ref 7–13)
RBC # BLD: 4.44 M/UL — SIGNIFICANT CHANGE UP (ref 4.2–5.8)
RBC # FLD: 13.3 % — SIGNIFICANT CHANGE UP (ref 10.3–14.5)
RH IG SCN BLD-IMP: POSITIVE — SIGNIFICANT CHANGE UP
WBC # BLD: 14.63 K/UL — HIGH (ref 3.8–10.5)
WBC # FLD AUTO: 14.63 K/UL — HIGH (ref 3.8–10.5)

## 2020-04-22 PROCEDURE — 99233 SBSQ HOSP IP/OBS HIGH 50: CPT

## 2020-04-22 PROCEDURE — 99497 ADVNCD CARE PLAN 30 MIN: CPT | Mod: 25

## 2020-04-22 PROCEDURE — 99231 SBSQ HOSP IP/OBS SF/LOW 25: CPT | Mod: GC

## 2020-04-22 PROCEDURE — 93971 EXTREMITY STUDY: CPT | Mod: 26

## 2020-04-22 PROCEDURE — 93970 EXTREMITY STUDY: CPT | Mod: 26

## 2020-04-22 RX ORDER — SODIUM CHLORIDE 9 MG/ML
1000 INJECTION INTRAMUSCULAR; INTRAVENOUS; SUBCUTANEOUS
Refills: 0 | Status: DISCONTINUED | OUTPATIENT
Start: 2020-04-22 | End: 2020-04-22

## 2020-04-22 RX ORDER — SODIUM CHLORIDE 9 MG/ML
500 INJECTION INTRAMUSCULAR; INTRAVENOUS; SUBCUTANEOUS ONCE
Refills: 0 | Status: COMPLETED | OUTPATIENT
Start: 2020-04-22 | End: 2020-04-22

## 2020-04-22 RX ADMIN — ALBUTEROL 1 PUFF(S): 90 AEROSOL, METERED ORAL at 11:06

## 2020-04-22 RX ADMIN — ENOXAPARIN SODIUM 80 MILLIGRAM(S): 100 INJECTION SUBCUTANEOUS at 07:08

## 2020-04-22 RX ADMIN — ALBUTEROL 1 PUFF(S): 90 AEROSOL, METERED ORAL at 06:00

## 2020-04-22 RX ADMIN — Medication 81 MILLIGRAM(S): at 11:07

## 2020-04-22 RX ADMIN — SODIUM CHLORIDE 500 MILLILITER(S): 9 INJECTION INTRAMUSCULAR; INTRAVENOUS; SUBCUTANEOUS at 11:05

## 2020-04-22 NOTE — PROGRESS NOTE ADULT - SUBJECTIVE AND OBJECTIVE BOX
[facility]  Genaro Zendejas MD  Pager (M-F, 8A-5P): [phone]  Other Times:  [phone]    Patient is a 79y old  Male who presents with a chief complaint of covid r/o (21 Apr 2020 13:36)    SUBJECTIVE / OVERNIGHT EVENTS: watery fowl smelling BM today. +hypoglycemia    MEDICATIONS  (STANDING):  ALBUTerol    90 MICROgram(s) HFA Inhaler 1 Puff(s) Inhalation every 4 hours  aspirin  chewable 81 milliGRAM(s) Oral daily  atorvastatin 20 milliGRAM(s) Oral at bedtime  carvedilol 25 milliGRAM(s) Oral every 12 hours  dextrose 5%. 1000 milliLiter(s) (50 mL/Hr) IV Continuous <Continuous>  dextrose 5%. 1000 milliLiter(s) (50 mL/Hr) IV Continuous <Continuous>  dextrose 50% Injectable 12.5 Gram(s) IV Push once  dextrose 50% Injectable 25 Gram(s) IV Push once  dextrose 50% Injectable 25 Gram(s) IV Push once  dextrose 50% Injectable 12.5 Gram(s) IV Push once  dextrose 50% Injectable 25 Gram(s) IV Push once  dextrose 50% Injectable 25 Gram(s) IV Push once  enoxaparin Injectable 80 milliGRAM(s) SubCutaneous two times a day  furosemide   Injectable 40 milliGRAM(s) IV Push once  insulin lispro (HumaLOG) corrective regimen sliding scale   SubCutaneous three times a day before meals  melatonin 6 milliGRAM(s) Oral at bedtime    MEDICATIONS  (PRN):  acetaminophen   Tablet .. 650 milliGRAM(s) Oral every 4 hours PRN Temp greater or equal to 38.5C (101.3F)  benzocaine 20% Spray 1 Spray(s) Topical three times a day PRN sore throat  dextrose 40% Gel 15 Gram(s) Oral once PRN Blood Glucose LESS THAN 70 milliGRAM(s)/deciLiter  dextrose 40% Gel 15 Gram(s) Oral once PRN Blood Glucose LESS THAN 70 milliGRAM(s)/deciliter  dextrose 40% Gel 15 Gram(s) Oral once PRN Blood Glucose LESS THAN 70 milliGRAM(s)/deciLiter  dextrose 40% Gel 15 Gram(s) Oral once PRN Blood Glucose LESS THAN 70 milliGRAM(s)/deciliter  dextrose 40% Gel 15 Gram(s) Oral once PRN Blood Glucose LESS THAN 70 milliGRAM(s)/deciliter  glucagon  Injectable 1 milliGRAM(s) IntraMuscular once PRN Glucose <70 milliGRAM(s)/deciLiter  glucagon  Injectable 1 milliGRAM(s) IntraMuscular once PRN Glucose LESS THAN 70 milligrams/deciliter      Vital Signs Last 24 Hrs  T(C): 36.4 (22 Apr 2020 06:19), Max: 36.4 (21 Apr 2020 11:58)  T(F): 97.5 (22 Apr 2020 06:19), Max: 97.5 (21 Apr 2020 11:58)  HR: 75 (22 Apr 2020 07:25) (66 - 100)  BP: 81/42 (22 Apr 2020 07:25) (81/42 - 97/49)  BP(mean): --  RR: 32 (22 Apr 2020 06:19) (28 - 34)  SpO2: 98% (22 Apr 2020 06:19) (96% - 100%)  CAPILLARY BLOOD GLUCOSE      POCT Blood Glucose.: 103 mg/dL (22 Apr 2020 10:22)  POCT Blood Glucose.: 81 mg/dL (22 Apr 2020 09:10)  POCT Blood Glucose.: 65 mg/dL (22 Apr 2020 09:09)  POCT Blood Glucose.: 61 mg/dL (22 Apr 2020 08:22)  POCT Blood Glucose.: 49 mg/dL (22 Apr 2020 08:19)  POCT Blood Glucose.: 140 mg/dL (21 Apr 2020 22:08)  POCT Blood Glucose.: 140 mg/dL (21 Apr 2020 17:27)    I&O's Summary    21 Apr 2020 07:01  -  22 Apr 2020 07:00  --------------------------------------------------------  IN: 0 mL / OUT: 200 mL / NET: -200 mL    22 Apr 2020 07:01  -  22 Apr 2020 11:51  --------------------------------------------------------  IN: 120 mL / OUT: 0 mL / NET: 120 mL        PHYSICAL EXAM:  GENERAL: on NRB dyspneic  HEAD:  Atraumatic, Normocephalic  EYES: EOMI, PERRLA, conjunctiva and sclera clear  NECK: Supple, No JVD  CHEST/LUNG: No respiratory distress  ABDOMEN: Nondistended; Bowel sounds present  EXTREMITIES:  No clubbing, cyanosis, or edema  PSYCH: Calm  NEUROLOGY: A/Ox1-2  SKIN: No rashes or lesions    LABS:                        14.1   14.63 )-----------( 100      ( 22 Apr 2020 10:10 )             41.6     04-21    137  |  89<L>  |  41<H>  ----------------------------<  45<LL>  3.1<L>   |  34<H>  |  0.86    Ca    8.3<L>      21 Apr 2020 05:50    TPro  6.4  /  Alb  2.1<L>  /  TBili  0.9  /  DBili  x   /  AST  30  /  ALT  12  /  AlkPhos  77  04-21              Procalcitonin, Serum: 0.09 ng/mL (04-18-20 @ 07:51)  Procalcitonin, Serum: 0.05 ng/mL (04-16-20 @ 05:51)    C-Reactive Protein, Serum: 12.6 mg/L (04-18-20 @ 07:51)  C-Reactive Protein, Serum: 20.9 mg/L (04-16-20 @ 05:51)      D-Dimer Assay, Quantitative: 854 ng/mL (04-22-20 @ 06:37)  D-Dimer Assay, Quantitative: 1975 ng/mL (04-16-20 @ 05:51)    Ferritin, Serum: 1115 ng/mL (04-18-20 @ 07:51)  Ferritin, Serum: 1268 ng/mL (04-16-20 @ 05:51)      RADIOLOGY & ADDITIONAL TESTS:    Imaging Personally Reviewed:    Care Discussed with Consultants/Other Providers: [facility]  Genaro Zendejas MD  Pager (M-F, 8A-5P): [phone]  Other Times:  [phone]    Patient is a 79y old  Male who presents with a chief complaint of covid r/o (21 Apr 2020 13:36)    SUBJECTIVE / OVERNIGHT EVENTS: watery fowl smelling BM today. +hypoglycemia. hypotensive this AM requiring  ml bolus.     MEDICATIONS  (STANDING):  ALBUTerol    90 MICROgram(s) HFA Inhaler 1 Puff(s) Inhalation every 4 hours  aspirin  chewable 81 milliGRAM(s) Oral daily  atorvastatin 20 milliGRAM(s) Oral at bedtime  carvedilol 25 milliGRAM(s) Oral every 12 hours  dextrose 5%. 1000 milliLiter(s) (50 mL/Hr) IV Continuous <Continuous>  dextrose 5%. 1000 milliLiter(s) (50 mL/Hr) IV Continuous <Continuous>  dextrose 50% Injectable 12.5 Gram(s) IV Push once  dextrose 50% Injectable 25 Gram(s) IV Push once  dextrose 50% Injectable 25 Gram(s) IV Push once  dextrose 50% Injectable 12.5 Gram(s) IV Push once  dextrose 50% Injectable 25 Gram(s) IV Push once  dextrose 50% Injectable 25 Gram(s) IV Push once  enoxaparin Injectable 80 milliGRAM(s) SubCutaneous two times a day  furosemide   Injectable 40 milliGRAM(s) IV Push once  insulin lispro (HumaLOG) corrective regimen sliding scale   SubCutaneous three times a day before meals  melatonin 6 milliGRAM(s) Oral at bedtime    MEDICATIONS  (PRN):  acetaminophen   Tablet .. 650 milliGRAM(s) Oral every 4 hours PRN Temp greater or equal to 38.5C (101.3F)  benzocaine 20% Spray 1 Spray(s) Topical three times a day PRN sore throat  dextrose 40% Gel 15 Gram(s) Oral once PRN Blood Glucose LESS THAN 70 milliGRAM(s)/deciLiter  dextrose 40% Gel 15 Gram(s) Oral once PRN Blood Glucose LESS THAN 70 milliGRAM(s)/deciliter  dextrose 40% Gel 15 Gram(s) Oral once PRN Blood Glucose LESS THAN 70 milliGRAM(s)/deciLiter  dextrose 40% Gel 15 Gram(s) Oral once PRN Blood Glucose LESS THAN 70 milliGRAM(s)/deciliter  dextrose 40% Gel 15 Gram(s) Oral once PRN Blood Glucose LESS THAN 70 milliGRAM(s)/deciliter  glucagon  Injectable 1 milliGRAM(s) IntraMuscular once PRN Glucose <70 milliGRAM(s)/deciLiter  glucagon  Injectable 1 milliGRAM(s) IntraMuscular once PRN Glucose LESS THAN 70 milligrams/deciliter      Vital Signs Last 24 Hrs  T(C): 36.4 (22 Apr 2020 06:19), Max: 36.4 (21 Apr 2020 11:58)  T(F): 97.5 (22 Apr 2020 06:19), Max: 97.5 (21 Apr 2020 11:58)  HR: 75 (22 Apr 2020 07:25) (66 - 100)  BP: 81/42 (22 Apr 2020 07:25) (81/42 - 97/49)  BP(mean): --  RR: 32 (22 Apr 2020 06:19) (28 - 34)  SpO2: 98% (22 Apr 2020 06:19) (96% - 100%)  CAPILLARY BLOOD GLUCOSE      POCT Blood Glucose.: 103 mg/dL (22 Apr 2020 10:22)  POCT Blood Glucose.: 81 mg/dL (22 Apr 2020 09:10)  POCT Blood Glucose.: 65 mg/dL (22 Apr 2020 09:09)  POCT Blood Glucose.: 61 mg/dL (22 Apr 2020 08:22)  POCT Blood Glucose.: 49 mg/dL (22 Apr 2020 08:19)  POCT Blood Glucose.: 140 mg/dL (21 Apr 2020 22:08)  POCT Blood Glucose.: 140 mg/dL (21 Apr 2020 17:27)    I&O's Summary    21 Apr 2020 07:01  -  22 Apr 2020 07:00  --------------------------------------------------------  IN: 0 mL / OUT: 200 mL / NET: -200 mL    22 Apr 2020 07:01  -  22 Apr 2020 11:51  --------------------------------------------------------  IN: 120 mL / OUT: 0 mL / NET: 120 mL        PHYSICAL EXAM:  GENERAL: on NRB dyspneic  HEAD:  Atraumatic, Normocephalic  EYES: EOMI, PERRLA, conjunctiva and sclera clear  NECK: Supple, No JVD  CHEST/LUNG: No respiratory distress  ABDOMEN: Nondistended; Bowel sounds present  EXTREMITIES:  No clubbing, cyanosis, or edema  PSYCH: Calm  NEUROLOGY: A/Ox1-2  SKIN: No rashes or lesions    LABS:                        14.1   14.63 )-----------( 100      ( 22 Apr 2020 10:10 )             41.6     04-21    137  |  89<L>  |  41<H>  ----------------------------<  45<LL>  3.1<L>   |  34<H>  |  0.86    Ca    8.3<L>      21 Apr 2020 05:50    TPro  6.4  /  Alb  2.1<L>  /  TBili  0.9  /  DBili  x   /  AST  30  /  ALT  12  /  AlkPhos  77  04-21              Procalcitonin, Serum: 0.09 ng/mL (04-18-20 @ 07:51)  Procalcitonin, Serum: 0.05 ng/mL (04-16-20 @ 05:51)    C-Reactive Protein, Serum: 12.6 mg/L (04-18-20 @ 07:51)  C-Reactive Protein, Serum: 20.9 mg/L (04-16-20 @ 05:51)      D-Dimer Assay, Quantitative: 854 ng/mL (04-22-20 @ 06:37)  D-Dimer Assay, Quantitative: 1975 ng/mL (04-16-20 @ 05:51)    Ferritin, Serum: 1115 ng/mL (04-18-20 @ 07:51)  Ferritin, Serum: 1268 ng/mL (04-16-20 @ 05:51)      RADIOLOGY & ADDITIONAL TESTS:    Imaging Personally Reviewed:    Care Discussed with Consultants/Other Providers:

## 2020-04-22 NOTE — DISCHARGE NOTE FOR THE EXPIRED PATIENT - TIME PATIENT HAD NO SPONTANEOUS RESPIRATION AND ABSENCE OF PULSE
22-Apr-2020 21:23 CALLI RACHEL  [id number]    HISTORY OF PRESENT ILLNESS: Rheumatology consulted for the evaluation of pyoderma gangrenosum , history of SLE    Patient is a 60 yo W, history of SLE and sjogren's, , pyoderma gangrenosum, Hashimoto's admitted for worsening skin lesions over the past few months   diagnosed with Pyoderma Gangrenosum 15 years ago, involving chest, back, groin, legs, and abdomen.  She recently saw her dermatologist who started her on Dapsone and prednisone which she has been taking over the past three weeks, however the lesions have been getting worse and now are more purulent and with more erythema around them.  She saw her dermatologist yesterday who told her to go to the ED.  She denies fevers, chills, shortness of breath, weakness, dysuria, nausea, vomiting, diarrhea.  No involvement of mucus membranes.  She does not currently have a rheumatologist, is currently looking for a new one.      Patient was seen in the ED on  for worsening painful lesions, was discharged from the ED on Doxycycline and bacitracin with outpatient rheumatology followup.  She completed the course of doxycycline.              Last seen By DR Joseph in      PAST MEDICAL & SURGICAL HISTORY:  Pyoderma gangrenosum  Depression  Hypothyroid  SLE (systemic lupus erythematosus)  HTN (hypertension)  S/P   S/P myomectomy      Review of Systems:  Gen:  No fevers/chills, weight loss  HEENT: No blurry vision, no difficulty swallowing  CVS: No chest pain/palpitations  Resp: No SOB/wheezing  GI: No N/V/C/D/abdominal pain  MSK:  Skin: No new rashes  Neuro: No headaches    MEDICATIONS  (STANDING):  clobetasol 0.05% Ointment 1 Application(s) Topical daily  enalapril 10 milliGRAM(s) Oral daily  FLUoxetine 80 milliGRAM(s) Oral daily  HYDROmorphone  Injectable 1 milliGRAM(s) IV Push once  hydroxychloroquine 200 milliGRAM(s) Oral every 12 hours  levothyroxine 137 MICROGram(s) Oral daily  metoprolol succinate ER 25 milliGRAM(s) Oral daily  mupirocin 2% Ointment 1 Application(s) Topical two times a day    MEDICATIONS  (PRN):  acetaminophen   Tablet. 650 milliGRAM(s) Oral every 6 hours PRN Mild Pain (1 - 3)  clonazePAM Tablet 1 milliGRAM(s) Oral four times a day PRN anxiety  oxyCODONE    5 mG/acetaminophen 325 mG 1 Tablet(s) Oral every 6 hours PRN Moderate Pain (4 - 6)      Allergies    Compazine (Other)    Intolerances        PERTINENT MEDICATION HISTORY:    SOCIAL HISTORY:  OCCUPATION:  TRAVEL HISTORY:    FAMILY HISTORY:  No pertinent family history in first degree relatives      Vital Signs Last 24 Hrs  T(C): 37.1 (2018 13:02), Max: 37.1 (2018 13:02)  T(F): 98.7 (2018 13:02), Max: 98.7 (2018 13:02)  HR: 72 (2018 13:02) (64 - 85)  BP: 130/65 (2018 13:02) (130/65 - 165/76)  BP(mean): --  RR: 17 (2018 13:02) (17 - 18)  SpO2: 96% (2018 13:02) (94% - 98%)    Daily     Daily     Physical Exam:  General: No apparent distress  HEENT: EOMI, MMM  CVS: +S1/S2, RRR, no murmurs/rubs/gallops  Resp: CTA b/l. No crackles/wheezing  GI: Soft, NT/ND +BS  MSK:  Shoulders: wnl  Elbows: wnl  Wrists: wnl  MCPs: wnl  PIPs: wnl  DIPs: wnl   Hips: wnl  Knees: wnl   Ankle: wnl  Neuro: AAOx3  Skin: no visible rashes    LABS:                        10.3   12.83 )-----------( 177      ( 2018 05:15 )             32.9         141  |  102  |  18  ----------------------------<  97  3.9   |  29  |  0.75    Ca    8.1<L>      2018 05:15  Phos  3.3       Mg     2.3         TPro  6.2  /  Alb  3.5  /  TBili  0.9  /  DBili  0.3<H>  /  AST  17  /  ALT  20  /  AlkPhos  72            RADIOLOGY & ADDITIONAL STUDIES: CALLI RACHEL  [id number]    HISTORY OF PRESENT ILLNESS: Rheumatology consulted for the evaluation of pyoderma gangrenosum , history of SLE    Patient is a 60 yo W, history of SLE and sjogren's, , pyoderma gangrenosum, Hashimoto's admitted for worsening skin lesions over the past few months   diagnosed with Pyoderma Gangrenosum 15 years ago, involving chest, back, groin, legs, and abdomen.  She recently saw her dermatologist who started her on Dapsone and prednisone which she has been taking over the past three weeks, however the lesions have been getting worse and now are more purulent and with more erythema around them.      Patient was seen in the ED on  for worsening painful lesions, was discharged from the ED on Doxycycline and bacitracin with outpatient rheumatology followup.  She completed the course of doxycycline.      Evaluated by dermatology yesterday: given lesion sizes, topical therapy is appropriate. recommended  Clobetasol Ointment          Last seen By DR Joseph in      PAST MEDICAL & SURGICAL HISTORY:  Pyoderma gangrenosum  Depression  Hypothyroid  SLE (systemic lupus erythematosus)  HTN (hypertension)  S/P   S/P myomectomy      Review of Systems:  Gen:  No fevers/chills, weight loss  HEENT: No blurry vision, no difficulty swallowing  CVS: No chest pain/palpitations  Resp: No SOB/wheezing  GI: No N/V/C/D/abdominal pain  MSK:  Skin: No new rashes  Neuro: No headaches    MEDICATIONS  (STANDING):  clobetasol 0.05% Ointment 1 Application(s) Topical daily  enalapril 10 milliGRAM(s) Oral daily  FLUoxetine 80 milliGRAM(s) Oral daily  HYDROmorphone  Injectable 1 milliGRAM(s) IV Push once  hydroxychloroquine 200 milliGRAM(s) Oral every 12 hours  levothyroxine 137 MICROGram(s) Oral daily  metoprolol succinate ER 25 milliGRAM(s) Oral daily  mupirocin 2% Ointment 1 Application(s) Topical two times a day    MEDICATIONS  (PRN):  acetaminophen   Tablet. 650 milliGRAM(s) Oral every 6 hours PRN Mild Pain (1 - 3)  clonazePAM Tablet 1 milliGRAM(s) Oral four times a day PRN anxiety  oxyCODONE    5 mG/acetaminophen 325 mG 1 Tablet(s) Oral every 6 hours PRN Moderate Pain (4 - 6)      Allergies    Compazine (Other)    Intolerances        PERTINENT MEDICATION HISTORY:    SOCIAL HISTORY:  OCCUPATION:  TRAVEL HISTORY:    FAMILY HISTORY:  No pertinent family history in first degree relatives      Vital Signs Last 24 Hrs  T(C): 37.1 (2018 13:02), Max: 37.1 (2018 13:02)  T(F): 98.7 (2018 13:02), Max: 98.7 (2018 13:02)  HR: 72 (2018 13:02) (64 - 85)  BP: 130/65 (2018 13:02) (130/65 - 165/76)  BP(mean): --  RR: 17 (2018 13:02) (17 - 18)  SpO2: 96% (2018 13:02) (94% - 98%)    Daily     Daily     Physical Exam:  General: No apparent distress  HEENT: EOMI, MMM  CVS: +S1/S2, RRR, no murmurs/rubs/gallops  Resp: CTA b/l. No crackles/wheezing  GI: Soft, NT/ND +BS  MSK:  Shoulders: wnl  Elbows: wnl  Wrists: wnl  MCPs: wnl  PIPs: wnl  DIPs: wnl   Hips: wnl  Knees: wnl   Ankle: wnl  Neuro: AAOx3  Skin: no visible rashes    LABS:                        10.3   12.83 )-----------( 177      ( 2018 05:15 )             32.9         141  |  102  |  18  ----------------------------<  97  3.9   |  29  |  0.75    Ca    8.1<L>      2018 05:15  Phos  3.3       Mg     2.3         TPro  6.2  /  Alb  3.5  /  TBili  0.9  /  DBili  0.3<H>  /  AST  17  /  ALT  20  /  AlkPhos  72            RADIOLOGY & ADDITIONAL STUDIES: CALLI RACHEL  [id number]    HISTORY OF PRESENT ILLNESS: Rheumatology consulted for the evaluation of pyoderma gangrenosum , history of SLE    Patient is a 60 yo W, history of SLE and sjogren's, , pyoderma gangrenosum, Hashimoto's admitted for worsening skin lesions over the past few months   diagnosed with Pyoderma Gangrenosum 15 years ago, involving chest, back, groin, legs, and abdomen.  She recently saw her dermatologist who started her on Dapsone and prednisone which she has been taking over the past three weeks, however the lesions have been getting worse and now are more purulent and with more erythema around them.      Patient was seen in the ED on  for worsening painful lesions, was discharged from the ED on Doxycycline and bacitracin with outpatient rheumatology followup.  She completed the course of doxycycline.      Evaluated by dermatology yesterday: given lesion sizes, topical therapy is appropriate. recommended  Clobetasol Ointment          Last seen By DR Joseph in      PAST MEDICAL & SURGICAL HISTORY:  Pyoderma gangrenosum  Depression  Hypothyroid  SLE (systemic lupus erythematosus)  HTN (hypertension)  S/P   S/P myomectomy      Review of Systems:   No fevers/chills, weight loss  no dry eyes but dry mouth  occasional joint pain at knuckles  no other skin rashes      MEDICATIONS  (STANDING):  clobetasol 0.05% Ointment 1 Application(s) Topical daily  enalapril 10 milliGRAM(s) Oral daily  FLUoxetine 80 milliGRAM(s) Oral daily  HYDROmorphone  Injectable 1 milliGRAM(s) IV Push once  hydroxychloroquine 200 milliGRAM(s) Oral every 12 hours  levothyroxine 137 MICROGram(s) Oral daily  metoprolol succinate ER 25 milliGRAM(s) Oral daily  mupirocin 2% Ointment 1 Application(s) Topical two times a day    MEDICATIONS  (PRN):  acetaminophen   Tablet. 650 milliGRAM(s) Oral every 6 hours PRN Mild Pain (1 - 3)  clonazePAM Tablet 1 milliGRAM(s) Oral four times a day PRN anxiety  oxyCODONE    5 mG/acetaminophen 325 mG 1 Tablet(s) Oral every 6 hours PRN Moderate Pain (4 - 6)      Allergies    Compazine (Other)    Intolerances        PERTINENT MEDICATION HISTORY:    SOCIAL HISTORY:  OCCUPATION:  TRAVEL HISTORY:    FAMILY HISTORY:  No pertinent family history in first degree relatives      Vital Signs Last 24 Hrs  T(C): 37.1 (2018 13:02), Max: 37.1 (2018 13:02)  T(F): 98.7 (2018 13:02), Max: 98.7 (2018 13:02)  HR: 72 (2018 13:02) (64 - 85)  BP: 130/65 (2018 13:02) (130/65 - 165/76)  BP(mean): --  RR: 17 (2018 13:02) (17 - 18)  SpO2: 96% (2018 13:02) (94% - 98%)    Daily     Daily     Physical Exam:  General: No apparent distress  HEENT: EOMI, MMM  CVS: +S1/S2, RRR,  Resp: CTA b/l.   GI: Soft, NT/ND +BS  MSK:  Shoulders: wnl  Elbows: wnl  Wrists: wnl  MCPs: wnl  PIPs: wnl  DIPs: wnl   Hips: wnl  Knees: wnl   Ankle: wnl  Neuro: AAOx3  Skin: scattered lesions with ulcerations and discharge on chest, upper back, b/l lower legs; mid abdomen with shallow erosion and overlying crust       LABS:                        10.3   12.83 )-----------( 177      ( 2018 05:15 )             32.9         141  |  102  |  18  ----------------------------<  97  3.9   |  29  |  0.75    Ca    8.1<L>      2018 05:15  Phos  3.3       Mg     2.3         TPro  6.2  /  Alb  3.5  /  TBili  0.9  /  DBili  0.3<H>  /  AST  17  /  ALT  20  /  AlkPhos  72            RADIOLOGY & ADDITIONAL STUDIES: CALLI RACHEL  [id number]    HISTORY OF PRESENT ILLNESS: Rheumatology consulted for the evaluation of pyoderma gangrenosum , history of SLE    Patient is a 58 yo W, history of SLE and sjogren's, , pyoderma gangrenosum, Hashimoto's admitted for worsening skin lesions over the past few months   diagnosed with Pyoderma Gangrenosum 15 years ago, involving chest, back, groin, legs, and abdomen.  She recently saw her dermatologist who started her on Dapsone and prednisone which she has been taking over the past three weeks, however the lesions have been getting worse and now are more purulent and with more erythema around them.      Patient was seen in the ED on  for worsening painful lesions, was discharged from the ED on Doxycycline and bacitracin with outpatient rheumatology followup.  She completed the course of doxycycline.      Evaluated by dermatology yesterday: given lesion sizes, topical therapy with Clobetasol ointment was recommended    Last seen By DR Joseph in      PAST MEDICAL & SURGICAL HISTORY:  Pyoderma gangrenosum  Depression  Hypothyroid  SLE (systemic lupus erythematosus)  HTN (hypertension)  S/P   S/P myomectomy      Review of Systems:   No fevers/chills, weight loss  no dry eyes but dry mouth  occasional joint pain at knuckles  no other skin rashes      MEDICATIONS  (STANDING):  clobetasol 0.05% Ointment 1 Application(s) Topical daily  enalapril 10 milliGRAM(s) Oral daily  FLUoxetine 80 milliGRAM(s) Oral daily  HYDROmorphone  Injectable 1 milliGRAM(s) IV Push once  hydroxychloroquine 200 milliGRAM(s) Oral every 12 hours  levothyroxine 137 MICROGram(s) Oral daily  metoprolol succinate ER 25 milliGRAM(s) Oral daily  mupirocin 2% Ointment 1 Application(s) Topical two times a day    MEDICATIONS  (PRN):  acetaminophen   Tablet. 650 milliGRAM(s) Oral every 6 hours PRN Mild Pain (1 - 3)  clonazePAM Tablet 1 milliGRAM(s) Oral four times a day PRN anxiety  oxyCODONE    5 mG/acetaminophen 325 mG 1 Tablet(s) Oral every 6 hours PRN Moderate Pain (4 - 6)      Allergies    Compazine (Other)    Intolerances        PERTINENT MEDICATION HISTORY:    SOCIAL HISTORY:  OCCUPATION:  TRAVEL HISTORY:    FAMILY HISTORY:  No pertinent family history in first degree relatives      Vital Signs Last 24 Hrs  T(C): 37.1 (2018 13:02), Max: 37.1 (2018 13:02)  T(F): 98.7 (2018 13:02), Max: 98.7 (2018 13:02)  HR: 72 (2018 13:02) (64 - 85)  BP: 130/65 (2018 13:02) (130/65 - 165/76)  BP(mean): --  RR: 17 (2018 13:02) (17 - 18)  SpO2: 96% (2018 13:02) (94% - 98%)    Daily     Daily     Physical Exam:  General: No apparent distress  HEENT: EOMI, MMM  CVS: +S1/S2, RRR,  Resp: CTA b/l.   GI: Soft, NT/ND +BS  MSK:  Shoulders: wnl  Elbows: wnl  Wrists: wnl  MCPs: wnl  PIPs: wnl  DIPs: wnl   Hips: wnl  Knees: wnl   Ankle: wnl  Neuro: AAOx3  Skin: scattered lesions with ulcerations and discharge on chest, upper back, b/l lower legs; mid abdomen with shallow erosion and overlying crust       LABS:                        10.3   12.83 )-----------( 177      ( 2018 05:15 )             32.9         141  |  102  |  18  ----------------------------<  97  3.9   |  29  |  0.75    Ca    8.1<L>      2018 05:15  Phos  3.3       Mg     2.3         TPro  6.2  /  Alb  3.5  /  TBili  0.9  /  DBili  0.3<H>  /  AST  17  /  ALT  20  /  AlkPhos  72            RADIOLOGY & ADDITIONAL STUDIES:

## 2020-04-22 NOTE — PROGRESS NOTE ADULT - NSHPATTENDINGPLANDISCUSS_GEN_ALL_CORE
patient, patient's wife
patient, patient's wife Glenny
ACP
patient and ACP
patient, patient's wife
patient, patient's wife
ACP
patient, patient's wife Glenny
patient and ACP
ACP

## 2020-04-22 NOTE — PROGRESS NOTE ADULT - PROBLEM SELECTOR PLAN 5
A1c 9.3%  hypoglycemia change discontinue Lantus 7 U units qHS.  Continue with FS qac and qHS  Low dose ISS  Holding home oral diabetic medications

## 2020-04-22 NOTE — PROVIDER CONTACT NOTE (OTHER) - BACKGROUND
Hypoxia  COVID   Pneumonia
+ covid
Patient was admitted with hypoxia, covid positive
Pt admitted for + covid
Pt is +COVID. Pt has h/o DM

## 2020-04-22 NOTE — PROGRESS NOTE ADULT - PROBLEM SELECTOR PLAN 4
- s/p anakinra  - Completed 5 days of Plaquenil  - s/p Solumedrol   - s/p zosyn/Vanco  - s/p anakinra  had clinical improvement after lasix, solumedrol, therapeutic Lovenox, and abx on 4/13.  -CXR 4/16 shows worsening b/l opacities  - s/p  Anakinra/plaquenil/steroid/abx   - s/p Zosyn/vanco   - c/w therapeutic Lovenox since may have a PE; Check CTPA when less hypoxic and able to lie flat.  Spoke to Pulmonary and patient not a candidate for tosculimab b/c already got anakinra and CRP only 12  - CXR on 4/18 with mild improvement   - Lasix held due to lower BPs-- proBNP trended down with diuresis elevated HCO3 secondary to diuresis will discontinue lasix. repeat CXR widespread disease unchanged.  - Patient rescinded his  DNR/DNI pt unable to make decision deferring to wife.  Wife Glenny is next of kin [phone].   -Palliative care c/s reviewed DNR/DNI order in chart   -Called Blood bank to give plasma for COVID 19 @ [phone] will be evaluated today  Called convalescent plasma trial @ [phone] spoke to coordinator will be evaluated today

## 2020-04-22 NOTE — PROGRESS NOTE ADULT - PROBLEM SELECTOR PLAN 3
- Due to the patient's health status and restrictions on visitation during the Public Health Emergency, the Advanced Care Planning services was performed via telephone with patient's wife, Glenny Carrera.  - Patient was unable to participate.  - 30 minutes spent discussing code status, overall prognosis, as well as goals of care.   - As per wife, she understands overall poor prognosis, and is asking for overall care to continue.  - However, she is interested in DNR/DNI.   - She states that the patient has made it known to her and their two kids that he would not want to be on a ventilator, and if the need arose, he would want to be comfortable with the assistance of pain medications.   - DNR entered in the chart.

## 2020-04-22 NOTE — PROVIDER CONTACT NOTE (OTHER) - ACTION/TREATMENT ORDERED:
hold lasix
Keep patient on the NRB @15L and Turn patient on the side and perform chest PT  Continue to monitor
Provider aware. Cont to monitor.
chest xray ordered, will come see patient
continue to monitor and send Pt when stable on oxygen
yes

## 2020-04-22 NOTE — PROGRESS NOTE ADULT - PROBLEM SELECTOR PLAN 2
s/p anakinra  Completed 5 days of Plaquenil  s/p Solumedrol   s/p zosyn/Vanco  c/w anakinra  Called convalescent plasma trial @ [phone] spoke to coordinator will be evaluated today - concern for PE given hypercoagulable state vs antibiotics vs infection vs HIT  - will hold lovenox for now await heme recs in regards to AC  - platelets dropping now 100k   - check LE duplex/UE duplex r/o thrombosis  - check HIT/serotonin release assay  - consult heme

## 2020-04-22 NOTE — CHART NOTE - NSCHARTNOTEFT_GEN_A_CORE
I have reviewed the patient’s situation and concur with them that use of emergency consent is appropriate and justified for the study, “Expanded Access to Convalescent Plasma for the Treatment of Patients with COVID-19”. Dr. Ruiz was the consenting professional and will be placing orders for this patient.

## 2020-04-22 NOTE — PROGRESS NOTE ADULT - PROBLEM SELECTOR PLAN 3
A1c 9.3%  FS stable  hypoglycemia change Lantus 7 U units qHS.  Continue with FS qac and qHS  Low dose ISS  Holding home oral diabetic medications -given recent antibiotics send for stool c diff/GI PCR  -f/u BMP

## 2020-04-22 NOTE — PROGRESS NOTE ADULT - ASSESSMENT
80 y/o male w/ hx HTN, hyperlipidemia, CAD s/p CABG admitted for COVID19 with acute hypoxic respiratory failure and viral pneumonia. He is s/p hydroxychlorquine, anakindra. He has also been on Solumedrol, Lasix and zosyn/vanco. Patient was clinically improving on treatment but despite O2 sat 100% NRB , afebrile, and stable BPs, patient' s work of breathing has worsened. There was also concern for acute PE so he was also started on therapeutic lovenox without a CTPA. He resended his DNR/DNI deferring to wife after discussion with family now DNR/DNI.

## 2020-04-22 NOTE — PROVIDER CONTACT NOTE (OTHER) - ASSESSMENT
Respiration rate 43 BP low side 100/50 Pt on nonrebreather 100% satting at 100% sensor placed on forhead as per PA
FS 49, repeated is 61
Patient is experiencing SOB, sating in the low 80's
Pt RR remains elevated 27, 88-90% on 100% NRB, episodes of desat to 85, treated with deep breathing, incentive spirometry, chest PT.
vss except oxygenation, pt on non-rebreather and Nc at 6L, turned to side, unable to tolerate prone

## 2020-04-22 NOTE — CHART NOTE - NSCHARTNOTEFT_GEN_A_CORE
80 y/o male w/ hx HTN, hyperlipidemia, CAD s/p CABG admitted for COVID19 with acute hypoxic respiratory failure and viral pneumonia, s/p hydroxychlorquine, anakinra. Patient was clinically improving then had RRT on 4/13 and was started on zosyn/vanco, and therapeutic Lovenox for concerns for PE (no CTPA). PLT count started dropping from 4/18 (baseline 164). Hematology consulted for thrombocytopenia.    REVIEW OF SYSTEMS: as above  No Known Allergies      PAST MEDICAL & SURGICAL HISTORY:  Hypercholesterolemia  Old MI (myocardial infarction): 1998  S/P CABG x 3: in 1998  CAD (coronary artery disease)  Essential hypertension  Prediabetes  S/P CABG x 3: 1998  History of appendectomy      FAMILY HISTORY:  No pertinent family history in first degree relatives      VITAL SIGNS:  Vital Signs Last 24 Hrs  T(C): 36.4 (22 Apr 2020 06:19), Max: 36.4 (22 Apr 2020 06:19)  T(F): 97.5 (22 Apr 2020 06:19), Max: 97.5 (22 Apr 2020 06:19)  HR: 75 (22 Apr 2020 07:25) (71 - 100)  BP: 81/42 (22 Apr 2020 07:25) (81/42 - 97/49)  BP(mean): --  RR: 32 (22 Apr 2020 06:19) (28 - 34)  SpO2: 98% (22 Apr 2020 06:19) (96% - 100%)                         14.1   14.63 )-----------( 100      ( 22 Apr 2020 10:10 )             41.6     04-21    137  |  89<L>  |  41<H>  ----------------------------<  45<LL>  3.1<L>   |  34<H>  |  0.86    Ca    8.3<L>      21 Apr 2020 05:50    TPro  6.4  /  Alb  2.1<L>  /  TBili  0.9  /  DBili  x   /  AST  30  /  ALT  12  /  AlkPhos  77  04-21    Assessment/Plan:  80 y/o male w/ hx HTN, hyperlipidemia, CAD s/p CABG admitted for COVID19 with acute hypoxic respiratory failure and viral pneumonia, s/p hydroxychlorquine, anakinra. S/p RRT on 4/13 and was started on zosyn/vanco, and therapeutic Lovenox for concerns for PE (no CTPA). Hematology consulted for thrombocytopenia.    #Thrombocytopenia  -Will review blood smear  -Baseline PLT is 164 and dropped to 104 on 4/19. Patient was started on Zosyn from 4/13 till 4/21 that can be the reason of thrombocytopenia, please c/w stopping zosyn  -The other etiology can be in the setting of deteriorating clinical condition in the setting of COVID and sepsis  -HIT score 3, low probability for HIT. (1 point for platelet drop less than 50%, 1 points for timing, 0 points for thrombosis/skin necrosis and 1 point for other possible causes), Can c/w Lovenox therapeutic for now.  -Please check coags and fibrinogen  -Hematology will follow    Cornelia Garcia PGY4  Heme/Onc fellow  [phone] 80 y/o male w/ hx HTN, hyperlipidemia, CAD s/p CABG admitted for COVID19 with acute hypoxic respiratory failure and viral pneumonia, s/p hydroxychlorquine, anakinra. Patient was clinically improving then had RRT on 4/13 and was started on zosyn/vanco, and therapeutic Lovenox for concerns for PE (no CTPA). PLT count started dropping from 4/18 (baseline 164). Hematology consulted for thrombocytopenia.    REVIEW OF SYSTEMS: as above  No Known Allergies      PAST MEDICAL & SURGICAL HISTORY:  Hypercholesterolemia  Old MI (myocardial infarction): 1998  S/P CABG x 3: in 1998  CAD (coronary artery disease)  Essential hypertension  Prediabetes  S/P CABG x 3: 1998  History of appendectomy      FAMILY HISTORY:  No pertinent family history in first degree relatives      VITAL SIGNS:  Vital Signs Last 24 Hrs  T(C): 36.4 (22 Apr 2020 06:19), Max: 36.4 (22 Apr 2020 06:19)  T(F): 97.5 (22 Apr 2020 06:19), Max: 97.5 (22 Apr 2020 06:19)  HR: 75 (22 Apr 2020 07:25) (71 - 100)  BP: 81/42 (22 Apr 2020 07:25) (81/42 - 97/49)  BP(mean): --  RR: 32 (22 Apr 2020 06:19) (28 - 34)  SpO2: 98% (22 Apr 2020 06:19) (96% - 100%)                         14.1   14.63 )-----------( 100      ( 22 Apr 2020 10:10 )             41.6     04-21    137  |  89<L>  |  41<H>  ----------------------------<  45<LL>  3.1<L>   |  34<H>  |  0.86    Ca    8.3<L>      21 Apr 2020 05:50    TPro  6.4  /  Alb  2.1<L>  /  TBili  0.9  /  DBili  x   /  AST  30  /  ALT  12  /  AlkPhos  77  04-21    Assessment/Plan:  80 y/o male w/ hx HTN, hyperlipidemia, CAD s/p CABG admitted for COVID19 with acute hypoxic respiratory failure and viral pneumonia, s/p hydroxychlorquine, anakinra. S/p RRT on 4/13 and was started on zosyn/vanco, and therapeutic Lovenox for concerns for PE (no CTPA). Hematology consulted for thrombocytopenia.    #Thrombocytopenia  -Will review blood smear  -Baseline PLT is 164 and dropped to 104 on 4/19. Patient was started on Zosyn from 4/13 till 4/21 that can be the reason of thrombocytopenia, please c/w stopping zosyn  -The other etiology can be in the setting of deteriorating clinical condition in the setting of COVID and sepsis  -HIT score 3, low probability for HIT. (1 point for platelet drop 30%-50%, 1 points for timing, 0 points for thrombosis/skin necrosis and 1 point for other possible causes), Can c/w Lovenox therapeutic for now.  -Please check coags and fibrinogen  -Hematology will follow    Cornelia Garcia PGY4  Heme/Onc fellow  [phone] 78 y/o male w/ hx HTN, hyperlipidemia, CAD s/p CABG admitted for COVID19 with acute hypoxic respiratory failure and viral pneumonia, s/p hydroxychlorquine, anakinra. Patient was clinically improving then had RRT on 4/13 and was started on zosyn/vanco, and therapeutic Lovenox for concerns for PE (no CTPA). PLT count started dropping from 4/18 (baseline 164). Hematology consulted for thrombocytopenia.    REVIEW OF SYSTEMS: as above  No Known Allergies      PAST MEDICAL & SURGICAL HISTORY:  Hypercholesterolemia  Old MI (myocardial infarction): 1998  S/P CABG x 3: in 1998  CAD (coronary artery disease)  Essential hypertension  Prediabetes  S/P CABG x 3: 1998  History of appendectomy      FAMILY HISTORY:  No pertinent family history in first degree relatives      VITAL SIGNS:  Vital Signs Last 24 Hrs  T(C): 36.4 (22 Apr 2020 06:19), Max: 36.4 (22 Apr 2020 06:19)  T(F): 97.5 (22 Apr 2020 06:19), Max: 97.5 (22 Apr 2020 06:19)  HR: 75 (22 Apr 2020 07:25) (71 - 100)  BP: 81/42 (22 Apr 2020 07:25) (81/42 - 97/49)  BP(mean): --  RR: 32 (22 Apr 2020 06:19) (28 - 34)  SpO2: 98% (22 Apr 2020 06:19) (96% - 100%)                         14.1   14.63 )-----------( 100      ( 22 Apr 2020 10:10 )             41.6     04-21    137  |  89<L>  |  41<H>  ----------------------------<  45<LL>  3.1<L>   |  34<H>  |  0.86    Ca    8.3<L>      21 Apr 2020 05:50    TPro  6.4  /  Alb  2.1<L>  /  TBili  0.9  /  DBili  x   /  AST  30  /  ALT  12  /  AlkPhos  77  04-21    Assessment/Plan:  78 y/o male w/ hx HTN, hyperlipidemia, CAD s/p CABG admitted for COVID19 with acute hypoxic respiratory failure and viral pneumonia, s/p hydroxychlorquine, anakinra. S/p RRT on 4/13 and was started on zosyn/vanco, and therapeutic Lovenox for concerns for PE (no CTPA). Hematology consulted for thrombocytopenia.    #Thrombocytopenia  -Will review blood smear  -Baseline PLT is 164 and dropped to 104 on 4/19. Patient was started on Zosyn from 4/13 till 4/21 that can be the reason of thrombocytopenia, please c/w stopping zosyn  -The other etiology can be in the setting of deteriorating clinical condition in the setting of COVID and sepsis  -HIT score 3, low probability for HIT. (1 point for platelet drop 30%-50%, 1 points for timing, 0 points for thrombosis/skin necrosis and 1 point for other possible causes), Can c/w Lovenox therapeutic for now.  -Please check coags and fibrinogen  -Hematology will follow    Cornelia Garcia PGY4  Heme/Onc fellow  [phone]    Attg addendum  - agree with fellow's note as outlined above

## 2020-04-22 NOTE — PROVIDER CONTACT NOTE (OTHER) - RECOMMENDATIONS
Hold lasix
4 oz juice given as per protocol. Will recheck FS
Keep patient on the NRB @ 15L. Turn patient on the side and perform chest PT
chest xray
continue to monitor and send Pt when stable on oxygen

## 2020-04-22 NOTE — PROGRESS NOTE ADULT - SUBJECTIVE AND OBJECTIVE BOX
SUBJECTIVE AND OBJECTIVE:  INTERVAL HPI/OVERNIGHT EVENTS:    DNR on chart: Yes  Yes    Allergies    No Known Allergies    Intolerances    MEDICATIONS  (STANDING):  ALBUTerol    90 MICROgram(s) HFA Inhaler 1 Puff(s) Inhalation every 4 hours  aspirin  chewable 81 milliGRAM(s) Oral daily  atorvastatin 20 milliGRAM(s) Oral at bedtime  carvedilol 25 milliGRAM(s) Oral every 12 hours  dextrose 5%. 1000 milliLiter(s) (50 mL/Hr) IV Continuous <Continuous>  dextrose 5%. 1000 milliLiter(s) (50 mL/Hr) IV Continuous <Continuous>  dextrose 50% Injectable 12.5 Gram(s) IV Push once  dextrose 50% Injectable 25 Gram(s) IV Push once  dextrose 50% Injectable 25 Gram(s) IV Push once  dextrose 50% Injectable 12.5 Gram(s) IV Push once  dextrose 50% Injectable 25 Gram(s) IV Push once  dextrose 50% Injectable 25 Gram(s) IV Push once  enoxaparin Injectable 80 milliGRAM(s) SubCutaneous two times a day  furosemide   Injectable 40 milliGRAM(s) IV Push once  insulin glargine Injectable (LANTUS) 7 Unit(s) SubCutaneous at bedtime  insulin lispro (HumaLOG) corrective regimen sliding scale   SubCutaneous three times a day before meals  melatonin 6 milliGRAM(s) Oral at bedtime    MEDICATIONS  (PRN):  acetaminophen   Tablet .. 650 milliGRAM(s) Oral every 4 hours PRN Temp greater or equal to 38.5C (101.3F)  benzocaine 20% Spray 1 Spray(s) Topical three times a day PRN sore throat  dextrose 40% Gel 15 Gram(s) Oral once PRN Blood Glucose LESS THAN 70 milliGRAM(s)/deciLiter  dextrose 40% Gel 15 Gram(s) Oral once PRN Blood Glucose LESS THAN 70 milliGRAM(s)/deciliter  dextrose 40% Gel 15 Gram(s) Oral once PRN Blood Glucose LESS THAN 70 milliGRAM(s)/deciLiter  dextrose 40% Gel 15 Gram(s) Oral once PRN Blood Glucose LESS THAN 70 milliGRAM(s)/deciliter  dextrose 40% Gel 15 Gram(s) Oral once PRN Blood Glucose LESS THAN 70 milliGRAM(s)/deciliter  glucagon  Injectable 1 milliGRAM(s) IntraMuscular once PRN Glucose <70 milliGRAM(s)/deciLiter  glucagon  Injectable 1 milliGRAM(s) IntraMuscular once PRN Glucose LESS THAN 70 milligrams/deciliter      ITEMS UNCHECKED ARE NOT PRESENT    PRESENT SYMPTOMS: [x ]Unable to obtain due to poor mentation   Source if other than patient:  [ ]Family   [ ]Team     Pain (Impact on QOL):    Location:  Minimal acceptable level (0-10 scale):                   Aggravating factors:  Quality:  Radiation:  Severity (0-10 scale):    Timing:    Dyspnea:                           [ ]Mild [ ]Moderate [ ]Severe  Anxiety:                             [ ]Mild [ ]Moderate [ ]Severe  Fatigue:                             [ ]Mild [ ]Moderate [ ]Severe  Nausea:                             [ ]Mild [ ]Moderate [ ]Severe  Loss of appetite:              [ ]Mild [ ]Moderate [ ]Severe  Constipation:                    [ ]Mild [ ]Moderate [ ]Severe  Grief Present                    [ ] Yes  [ ] No   PAIN AD Score:	  http://geriatrictoolkit.[email or web address]/cog/painad.pdf (Ctrl + left click to view)    Other Symptoms:  [x ]All other review of systems negative     Karnofsky Performance Score/Palliative Performance Status Version 2:  30     %    http://palliative.info/resource_material/[email or web address].pdf  PHYSICAL EXAM:  Vital Signs Last 24 Hrs  T(C): 36.4 (22 Apr 2020 06:19), Max: 36.4 (21 Apr 2020 11:58)  T(F): 97.5 (22 Apr 2020 06:19), Max: 97.5 (21 Apr 2020 11:58)  HR: 75 (22 Apr 2020 07:25) (66 - 100)  BP: 81/42 (22 Apr 2020 07:25) (81/42 - 97/49)  BP(mean): --  RR: 32 (22 Apr 2020 06:19) (28 - 34)  SpO2: 98% (22 Apr 2020 06:19) (96% - 100%) I&O's Summary    21 Apr 2020 07:01  -  22 Apr 2020 07:00  --------------------------------------------------------  IN: 0 mL / OUT: 200 mL / NET: -200 mL    22 Apr 2020 07:01 - 22 Apr 2020 11:38  --------------------------------------------------------  IN: 120 mL / OUT: 0 mL / NET: 120 mL    CRITICAL CARE:  [ ] Shock Present  [ ]Septic [ ]Cardiogenic [ ]Neurologic [ ]Hypovolemic  [ ]  Vasopressors [ ]  Inotropes   [x ] Respiratory failure present  [ x] Acute  [ ] Chronic [x ] Hypoxic  [ ] Hypercarbic [ ] Other  [ ] Other organ failure     LABS:                        14.1   14.63 )-----------( 100      ( 22 Apr 2020 10:10 )             41.6   04-21    137  |  89<L>  |  41<H>  ----------------------------<  45<LL>  3.1<L>   |  34<H>  |  0.86    Ca    8.3<L>      21 Apr 2020 05:50    TPro  6.4  /  Alb  2.1<L>  /  TBili  0.9  /  DBili  x   /  AST  30  /  ALT  12  /  AlkPhos  77  04-21        RADIOLOGY & ADDITIONAL STUDIES:    Protein Calorie Malnutrition Present: [ ] yes [ ] no  [ ] PPSV2 < or = 30%  [ ] significant weight loss [ ] poor nutritional intake [ ] anasarca [ ] catabolic state Albumin, Serum: 2.1 g/dL (04-21-20 @ 05:50)  Artificial Nutrition [ ]     REFERRALS:   [ ]Chaplaincy  [ ] Hospice  [ ]Child Life  [ ]Social Work  [ ]Case management [ ]Holistic Therapy   Goals of Care Document:

## 2020-04-22 NOTE — PROVIDER CONTACT NOTE (OTHER) - SITUATION
BP low 100/50 should lasix be held
Low blood sugar
Patient is sating at low 80's
Transport arrived to sent Pt to CT scan ordered for head, Pt too unstable to send at this time
oxygen low on fingers, head reading 100 percent

## 2020-04-22 NOTE — PROGRESS NOTE ADULT - PROBLEM SELECTOR PLAN 6
statin    Due to [facility] policy during the evolving novel coronavirus outbreak, efforts are being made to limit unnecessary patient contacts to limit the spread of disease and preserve limited PPE. H&P along with assessment and plan is completed with the HPI, ROS, and PE of the emergency department. I have spoken to the patient to discuss code status.
- Patient rescinded his DNR/DNI pt unable to make decision deferring to wife.  Wife Glenny is next of kin [phone].   -Palliative care c/s reviewed DNR/DNI order in chart

## 2020-04-22 NOTE — DISCHARGE NOTE FOR THE EXPIRED PATIENT - HOSPITAL COURSE
78 y/o male w/ hx HTN, hyperlipidemia, CAD s/p CABG admitted for COVID19 with acute hypoxic respiratory failure and viral pneumonia. He is s/p hydroxychlorquine, anakinra. He has also been on Solumedrol, Lasix and zosyn/vanco. Patient was clinically improving on treatment but despite O2 sat 100% NRB , afebrile, and stable BPs, patient' s work of breathing has worsened. There was also concern for acute PE so he was also started on therapeutic lovenox without a CTPA.     Problem/Plan - 1:  ·  Problem: Acute respiratory failure with hypoxia. Plan: -likley secondary to covid  -had clinical improvement after lasix, solumedrol, therapeutic Lovenox, and abx on 4/13.  -CXR 4/16 shows worsening b/l opacities  - s/p  Anakinra/plaquenil/steroid/abx   - s/p Zosyn/vanco   - c/w therapeutic Lovenox since may have a PE; Check CTPA when less hypoxic and able to lie flat.  Spoke to Pulmonary and patient not a candidate for tosculimab b/c already got anakinra and CRP only 12  - CXR on 4/18 with mild improvement   - Lasix held due to lower BPs-- proBNP trended down with diuresis elevated HCO3 secondary to diuresis will discontinue lasix. repeat CXR widespread disease unchanged with diuresis  - BP on lower end lung exam clear s/p bolus 500cc start NS @50 ml/hr x 12 hrs. If SBP still low despite fluid can start midodrine 5 TID  -Called Blood bank to give plasma for COVID 19 @ [phone] will be evaluated today.     Problem/Plan - 2:  ·  Problem: Thrombocytopenia. Plan: - concern for PE given hypercoagulable state vs antibiotics vs infection vs HIT  - will hold lovenox for now await heme recs in regards to AC  - platelets dropping now 100k   - check LE duplex/UE duplex r/o thrombosis  - check HIT/serotonin release assay  - consult heme.     Problem/Plan - 3:  ·  Problem: Diarrhea, unspecified type. Plan: -given recent antibiotics send for stool c diff/GI PCR  -f/u BMP.     Problem/Plan - 4:  ·  Problem: Pneumonia due to 2019 novel coronavirus.  Plan: - s/p anakinra  - Completed 5 days of Plaquenil  - s/p Solumedrol   - s/p zosyn/Vanco  - s/p anakinra  had clinical improvement after lasix, solumedrol, therapeutic Lovenox, and abx on 4/13.  -CXR 4/16 shows worsening b/l opacities  - s/p  Anakinra/plaquenil/steroid/abx   - s/p Zosyn/vanco   - c/w therapeutic Lovenox since may have a PE; Check CTPA when less hypoxic and able to lie flat.  Spoke to Pulmonary and patient not a candidate for tosculimab b/c already got anakinra and CRP only 12  - CXR on 4/18 with mild improvement   - Lasix held due to lower BPs-- proBNP trended down with diuresis elevated HCO3 secondary to diuresis will discontinue lasix. repeat CXR widespread disease unchanged.  - Patient rescinded his  DNR/DNI pt unable to make decision deferring to wife.  Wife Glenny is next of kin [phone].   -Palliative care c/s reviewed DNR/DNI order in chart   -Called Blood bank to give plasma for COVID 19 @ [phone] will be evaluated today  Called convalescent plasma trial @ [phone] spoke to coordinator will be evaluated today.      Problem/Plan - 5:  ·  Problem: Type 2 diabetes mellitus without complication, without long-term current use of insulin.  Plan: A1c 9.3%  hypoglycemia change discontinue Lantus 7 U units qHS.  Continue with FS qac and qHS  Low dose ISS  Holding home oral diabetic medications.      Problem/Plan - 6:  Problem: Goals of care, counseling/discussion. Plan: - Patient rescinded his DNR/DNI pt unable to make decision deferring to wife.  Wife Glenny is next of kin [phone].   -Palliative care c/s reviewed DNR/DNI order in chart.      4/22:  Notified by RN that patient was not breathing.  Assessed pt at bedside; patient appears to be not breathing and is pulseless, pupils were nonreactive, there was no corneal reflex.  No carotid or femoral pulse.  Patient pronounced dead at 2130.  Family and attending notified.

## 2020-04-22 NOTE — PROGRESS NOTE ADULT - PROBLEM SELECTOR PLAN 1
had clinical improvement after lasix, solumedrol, therapeutic Lovenox, and abx on 4/13.  -CXR 4/16 shows worsening b/l opacities  - s/p  Anakinra/plaquenil/steroid/abx   - s/p Zosyn/vanco   - c/w therapeutic Lovenox since may have a PE; Check CTPA when less hypoxic and able to lie flat.  Spoke to Pulmonary and patient not a candidate for tosculimab b/c already got anakinra and CRP only 12  - CXR on 4/18 with mild improvement   - Lasix held due to lower BPs--f/u proBNP elevated HCO3 secondary to diuresis will discontinue lasix. repeat CXR widespread disease.  - Patient rescinded his  DNR/DNI pt unable to make decision deferring to wife.  Wife Glenny is next of kin [phone].   -Palliative care c/s reviewed DNR/DNI order in chart   -Called Blood bank to give plasma for COVID 19 @ [phone] will be evaluated today -likley secondary to covid  -had clinical improvement after lasix, solumedrol, therapeutic Lovenox, and abx on 4/13.  -CXR 4/16 shows worsening b/l opacities  - s/p  Anakinra/plaquenil/steroid/abx   - s/p Zosyn/vanco   - c/w therapeutic Lovenox since may have a PE; Check CTPA when less hypoxic and able to lie flat.  Spoke to Pulmonary and patient not a candidate for tosculimab b/c already got anakinra and CRP only 12  - CXR on 4/18 with mild improvement   - Lasix held due to lower BPs-- proBNP trended down with diuresis elevated HCO3 secondary to diuresis will discontinue lasix. repeat CXR widespread disease unchanged with diuresis  - BP on lower end lung exam clear s/p bolus 500cc start NS @50 ml/hr x 12 hrs. If SBP still low despite fluid can start midodrine 5 TID  -Called Blood bank to give plasma for COVID 19 @ [phone] will be evaluated today

## 2020-04-22 NOTE — PROGRESS NOTE ADULT - NSREFPHYEXREFTO_GEN_ALL_CORE
Inpatient Physical Exam

## 2020-04-23 LAB
NIGHT BLUE STAIN TISS: SIGNIFICANT CHANGE UP
SPECIMEN SOURCE: SIGNIFICANT CHANGE UP

## 2020-04-24 LAB — SRA INTERP SER-IMP: SIGNIFICANT CHANGE UP

## 2020-06-13 LAB
CULTURE RESULTS: SIGNIFICANT CHANGE UP
SPECIMEN SOURCE: SIGNIFICANT CHANGE UP

## 2021-10-06 PROBLEM — I10 ESSENTIAL HYPERTENSION: Status: ACTIVE | Noted: 2017-11-09

## 2023-10-19 NOTE — PROGRESS NOTE ADULT - PROBLEM SELECTOR PROBLEM 4
Render Risk Assessment In Note?: no Detail Level: Simple Additional Notes: Discussed filler for perioral lines and possibly lips . Quoted 2 fillers at $1200. Pneumonia due to 2019 novel coronavirus

## 2025-05-28 NOTE — PROGRESS NOTE ADULT - PROBLEM/PLAN-3
----- Message from Dale Knox sent at 5/27/2025  3:04 PM CDT -----  Type:  Patient Requesting Referral   Who Called:pt   Does the patient already have the specialty appointment scheduled?:no  If yes, what is the date of that appointment?:no   Referral to What Specialty:physical therapy   Reason for Referral:lower back pain   Does the patient want the referral with a specific physician?:no  [facility] the [facility]   Patient Requesting a Response?:yes   Would the patient rather a call back or a response via [facility]? Call   Best Call Back Number: [phone]  
Referral placed  
DISPLAY PLAN FREE TEXT